# Patient Record
Sex: MALE | Race: WHITE | Employment: OTHER | URBAN - METROPOLITAN AREA
[De-identification: names, ages, dates, MRNs, and addresses within clinical notes are randomized per-mention and may not be internally consistent; named-entity substitution may affect disease eponyms.]

---

## 2018-03-13 ENCOUNTER — HOSPITAL ENCOUNTER (OUTPATIENT)
Dept: PHYSICAL THERAPY | Age: 83
Setting detail: THERAPIES SERIES
Discharge: HOME | End: 2018-03-13
Payer: MEDICARE

## 2018-03-13 PROCEDURE — 97110 THERAPEUTIC EXERCISES: CPT

## 2018-03-15 ENCOUNTER — HOSPITAL ENCOUNTER (OUTPATIENT)
Dept: PHYSICAL THERAPY | Age: 83
Setting detail: THERAPIES SERIES
Discharge: HOME | End: 2018-03-15
Payer: MEDICARE

## 2018-03-15 PROCEDURE — G8982 BODY POS GOAL STATUS: HCPCS

## 2018-03-15 PROCEDURE — G8983 BODY POS D/C STATUS: HCPCS

## 2018-03-15 PROCEDURE — 97110 THERAPEUTIC EXERCISES: CPT

## 2018-03-15 PROCEDURE — G8981 BODY POS CURRENT STATUS: HCPCS

## 2018-08-31 ENCOUNTER — APPOINTMENT (OUTPATIENT)
Dept: GENERAL RADIOLOGY | Age: 83
DRG: 190 | End: 2018-08-31
Payer: MEDICARE

## 2018-08-31 ENCOUNTER — HOSPITAL ENCOUNTER (INPATIENT)
Age: 83
LOS: 4 days | Discharge: HOME | DRG: 190 | End: 2018-09-04
Attending: EMERGENCY MEDICINE | Admitting: INTERNAL MEDICINE
Payer: MEDICARE

## 2018-08-31 DIAGNOSIS — J44.1 COPD EXACERBATION (HCC): ICD-10-CM

## 2018-08-31 DIAGNOSIS — Z99.81 OXYGEN DEPENDENT: ICD-10-CM

## 2018-08-31 DIAGNOSIS — J96.01 ACUTE RESPIRATORY FAILURE WITH HYPOXIA (HCC): ICD-10-CM

## 2018-08-31 DIAGNOSIS — R79.1 SUPRATHERAPEUTIC INR: ICD-10-CM

## 2018-08-31 DIAGNOSIS — J18.9 COMMUNITY ACQUIRED PNEUMONIA, UNSPECIFIED LATERALITY: Primary | ICD-10-CM

## 2018-08-31 PROBLEM — J44.9 COPD (CHRONIC OBSTRUCTIVE PULMONARY DISEASE) (HCC): Chronic | Status: ACTIVE | Noted: 2018-08-31

## 2018-08-31 PROBLEM — J96.00 ACUTE RESPIRATORY FAILURE (HCC): Status: ACTIVE | Noted: 2018-08-31

## 2018-08-31 PROBLEM — Z86.718 HISTORY OF DVT (DEEP VEIN THROMBOSIS): Chronic | Status: ACTIVE | Noted: 2018-08-31

## 2018-08-31 LAB
ACETAMINOPHEN LEVEL: <5 MCG/ML (ref 10–30)
ALBUMIN SERPL-MCNC: 3.5 G/DL (ref 3.5–5.2)
ALP BLD-CCNC: 77 U/L (ref 40–129)
ALT SERPL-CCNC: 25 U/L (ref 0–40)
AMPHETAMINE SCREEN, URINE: NOT DETECTED
ANION GAP SERPL CALCULATED.3IONS-SCNC: 12 MMOL/L (ref 7–16)
APTT: 42.8 SEC (ref 24.5–35.1)
AST SERPL-CCNC: 24 U/L (ref 0–39)
B.E.: -3.5 MMOL/L (ref -3–3)
BACTERIA: ABNORMAL /HPF
BARBITURATE SCREEN URINE: NOT DETECTED
BASOPHILS ABSOLUTE: 0.06 E9/L (ref 0–0.2)
BASOPHILS RELATIVE PERCENT: 0.9 % (ref 0–2)
BENZODIAZEPINE SCREEN, URINE: NOT DETECTED
BILIRUB SERPL-MCNC: 0.5 MG/DL (ref 0–1.2)
BILIRUBIN URINE: NEGATIVE
BLOOD, URINE: ABNORMAL
BUN BLDV-MCNC: 28 MG/DL (ref 8–23)
CALCIUM SERPL-MCNC: 9.2 MG/DL (ref 8.6–10.2)
CANNABINOID SCREEN URINE: NOT DETECTED
CASTS: ABNORMAL /LPF
CHLORIDE BLD-SCNC: 104 MMOL/L (ref 98–107)
CLARITY: CLEAR
CO2: 23 MMOL/L (ref 22–29)
COCAINE METABOLITE SCREEN URINE: NOT DETECTED
COHB: 1.5 % (ref 0–1.5)
COLOR: YELLOW
CREAT SERPL-MCNC: 1.6 MG/DL (ref 0.7–1.2)
CRITICAL: ABNORMAL
DATE ANALYZED: ABNORMAL
DATE OF COLLECTION: ABNORMAL
EOSINOPHILS ABSOLUTE: 0.25 E9/L (ref 0.05–0.5)
EOSINOPHILS RELATIVE PERCENT: 4 % (ref 0–6)
EPITHELIAL CELLS, UA: ABNORMAL /HPF
ETHANOL: <10 MG/DL (ref 0–0.08)
GFR AFRICAN AMERICAN: 50
GFR NON-AFRICAN AMERICAN: 42 ML/MIN/1.73
GLUCOSE BLD-MCNC: 212 MG/DL (ref 74–109)
GLUCOSE URINE: 100 MG/DL
HCO3: 19.4 MMOL/L (ref 22–26)
HCT VFR BLD CALC: 45.3 % (ref 37–54)
HEMOGLOBIN: 14.7 G/DL (ref 12.5–16.5)
HHB: 11 % (ref 0–5)
IMMATURE GRANULOCYTES #: 0.03 E9/L
IMMATURE GRANULOCYTES %: 0.5 % (ref 0–5)
INR BLD: 6.2
KETONES, URINE: NEGATIVE MG/DL
LAB: ABNORMAL
LEUKOCYTE ESTERASE, URINE: NEGATIVE
LYMPHOCYTES ABSOLUTE: 1.3 E9/L (ref 1.5–4)
LYMPHOCYTES RELATIVE PERCENT: 20.6 % (ref 20–42)
Lab: 1728
MCH RBC QN AUTO: 28.3 PG (ref 26–35)
MCHC RBC AUTO-ENTMCNC: 32.5 % (ref 32–34.5)
MCV RBC AUTO: 87.1 FL (ref 80–99.9)
METER GLUCOSE: 237 MG/DL (ref 70–110)
METHADONE SCREEN, URINE: NOT DETECTED
METHB: 0.3 % (ref 0–1.5)
MODE: ABNORMAL
MONOCYTES ABSOLUTE: 0.66 E9/L (ref 0.1–0.95)
MONOCYTES RELATIVE PERCENT: 10.4 % (ref 2–12)
NEUTROPHILS ABSOLUTE: 4.02 E9/L (ref 1.8–7.3)
NEUTROPHILS RELATIVE PERCENT: 63.6 % (ref 43–80)
NITRITE, URINE: NEGATIVE
O2 CONTENT: 18.5 ML/DL
O2 SATURATION: 88.8 % (ref 92–98.5)
O2HB: 87.2 % (ref 94–97)
OPERATOR ID: 5721
OPIATE SCREEN URINE: POSITIVE
PATIENT TEMP: 37 C
PCO2: 29.8 MMHG (ref 35–45)
PDW BLD-RTO: 15.8 FL (ref 11.5–15)
PH BLOOD GAS: 7.43 (ref 7.35–7.45)
PH UA: 5 (ref 5–9)
PHENCYCLIDINE SCREEN URINE: NOT DETECTED
PLATELET # BLD: 201 E9/L (ref 130–450)
PMV BLD AUTO: 10.5 FL (ref 7–12)
PO2: 53.4 MMHG (ref 60–100)
POTASSIUM SERPL-SCNC: 4.4 MMOL/L (ref 3.5–5)
PRO-BNP: 367 PG/ML (ref 0–450)
PROPOXYPHENE SCREEN: NOT DETECTED
PROTEIN UA: ABNORMAL MG/DL
PROTHROMBIN TIME: 69 SEC (ref 9.3–12.4)
RBC # BLD: 5.2 E12/L (ref 3.8–5.8)
RBC UA: ABNORMAL /HPF (ref 0–2)
SALICYLATE, SERUM: <0.3 MG/DL (ref 0–30)
SODIUM BLD-SCNC: 139 MMOL/L (ref 132–146)
SOURCE, BLOOD GAS: ABNORMAL
SPECIFIC GRAVITY UA: >=1.03 (ref 1–1.03)
THB: 15.1 G/DL (ref 11.5–16.5)
TIME ANALYZED: 1731
TOTAL PROTEIN: 7.5 G/DL (ref 6.4–8.3)
TRICYCLIC ANTIDEPRESSANTS SCREEN SERUM: NEGATIVE NG/ML
TROPONIN: <0.01 NG/ML (ref 0–0.03)
TSH SERPL DL<=0.05 MIU/L-ACNC: 1.64 UIU/ML (ref 0.27–4.2)
UROBILINOGEN, URINE: 0.2 E.U./DL
WBC # BLD: 6.3 E9/L (ref 4.5–11.5)
WBC UA: ABNORMAL /HPF (ref 0–5)

## 2018-08-31 PROCEDURE — 2580000003 HC RX 258: Performed by: EMERGENCY MEDICINE

## 2018-08-31 PROCEDURE — 6360000002 HC RX W HCPCS: Performed by: EMERGENCY MEDICINE

## 2018-08-31 PROCEDURE — 94664 DEMO&/EVAL PT USE INHALER: CPT

## 2018-08-31 PROCEDURE — 81001 URINALYSIS AUTO W/SCOPE: CPT

## 2018-08-31 PROCEDURE — 82805 BLOOD GASES W/O2 SATURATION: CPT

## 2018-08-31 PROCEDURE — 80307 DRUG TEST PRSMV CHEM ANLYZR: CPT

## 2018-08-31 PROCEDURE — G0480 DRUG TEST DEF 1-7 CLASSES: HCPCS

## 2018-08-31 PROCEDURE — 1200000000 HC SEMI PRIVATE

## 2018-08-31 PROCEDURE — 85730 THROMBOPLASTIN TIME PARTIAL: CPT

## 2018-08-31 PROCEDURE — 6370000000 HC RX 637 (ALT 250 FOR IP): Performed by: EMERGENCY MEDICINE

## 2018-08-31 PROCEDURE — 6370000000 HC RX 637 (ALT 250 FOR IP): Performed by: INTERNAL MEDICINE

## 2018-08-31 PROCEDURE — 83880 ASSAY OF NATRIURETIC PEPTIDE: CPT

## 2018-08-31 PROCEDURE — 94640 AIRWAY INHALATION TREATMENT: CPT

## 2018-08-31 PROCEDURE — 96365 THER/PROPH/DIAG IV INF INIT: CPT

## 2018-08-31 PROCEDURE — 85025 COMPLETE CBC W/AUTO DIFF WBC: CPT

## 2018-08-31 PROCEDURE — 2500000003 HC RX 250 WO HCPCS: Performed by: EMERGENCY MEDICINE

## 2018-08-31 PROCEDURE — 84484 ASSAY OF TROPONIN QUANT: CPT

## 2018-08-31 PROCEDURE — 84443 ASSAY THYROID STIM HORMONE: CPT

## 2018-08-31 PROCEDURE — 36415 COLL VENOUS BLD VENIPUNCTURE: CPT

## 2018-08-31 PROCEDURE — 85610 PROTHROMBIN TIME: CPT

## 2018-08-31 PROCEDURE — 82962 GLUCOSE BLOOD TEST: CPT

## 2018-08-31 PROCEDURE — 71045 X-RAY EXAM CHEST 1 VIEW: CPT

## 2018-08-31 PROCEDURE — 96375 TX/PRO/DX INJ NEW DRUG ADDON: CPT

## 2018-08-31 PROCEDURE — 80053 COMPREHEN METABOLIC PANEL: CPT

## 2018-08-31 PROCEDURE — 2580000003 HC RX 258: Performed by: INTERNAL MEDICINE

## 2018-08-31 PROCEDURE — 99285 EMERGENCY DEPT VISIT HI MDM: CPT

## 2018-08-31 RX ORDER — ONDANSETRON 2 MG/ML
4 INJECTION INTRAMUSCULAR; INTRAVENOUS EVERY 6 HOURS PRN
Status: DISCONTINUED | OUTPATIENT
Start: 2018-08-31 | End: 2018-09-04 | Stop reason: HOSPADM

## 2018-08-31 RX ORDER — DAPSONE 25 MG/1
50 TABLET ORAL DAILY
Status: DISCONTINUED | OUTPATIENT
Start: 2018-09-01 | End: 2018-09-04 | Stop reason: HOSPADM

## 2018-08-31 RX ORDER — METHYLPREDNISOLONE SODIUM SUCCINATE 40 MG/ML
40 INJECTION, POWDER, LYOPHILIZED, FOR SOLUTION INTRAMUSCULAR; INTRAVENOUS EVERY 8 HOURS
Status: DISCONTINUED | OUTPATIENT
Start: 2018-09-01 | End: 2018-09-02

## 2018-08-31 RX ORDER — GUAIFENESIN 600 MG/1
1200 TABLET, EXTENDED RELEASE ORAL 2 TIMES DAILY
Status: ON HOLD | COMMUNITY
End: 2021-08-02 | Stop reason: HOSPADM

## 2018-08-31 RX ORDER — METHYLPREDNISOLONE SODIUM SUCCINATE 125 MG/2ML
125 INJECTION, POWDER, LYOPHILIZED, FOR SOLUTION INTRAMUSCULAR; INTRAVENOUS ONCE
Status: COMPLETED | OUTPATIENT
Start: 2018-08-31 | End: 2018-08-31

## 2018-08-31 RX ORDER — WARFARIN SODIUM 3 MG/1
TABLET ORAL DAILY
Status: ON HOLD | COMMUNITY
End: 2018-09-04 | Stop reason: HOSPADM

## 2018-08-31 RX ORDER — PANTOPRAZOLE SODIUM 40 MG/1
40 TABLET, DELAYED RELEASE ORAL
Status: DISCONTINUED | OUTPATIENT
Start: 2018-09-01 | End: 2018-09-04 | Stop reason: HOSPADM

## 2018-08-31 RX ORDER — GUAIFENESIN 400 MG/1
400 TABLET ORAL
Status: DISCONTINUED | OUTPATIENT
Start: 2018-08-31 | End: 2018-09-04 | Stop reason: HOSPADM

## 2018-08-31 RX ORDER — FINASTERIDE 5 MG/1
5 TABLET, FILM COATED ORAL DAILY
Status: DISCONTINUED | OUTPATIENT
Start: 2018-09-01 | End: 2018-09-04 | Stop reason: HOSPADM

## 2018-08-31 RX ORDER — ALBUTEROL SULFATE 2.5 MG/3ML
2.5 SOLUTION RESPIRATORY (INHALATION)
Status: DISCONTINUED | OUTPATIENT
Start: 2018-08-31 | End: 2018-09-04 | Stop reason: HOSPADM

## 2018-08-31 RX ORDER — CEFTRIAXONE 1 G/1
INJECTION, POWDER, FOR SOLUTION INTRAMUSCULAR; INTRAVENOUS
Status: DISPENSED
Start: 2018-08-31 | End: 2018-09-01

## 2018-08-31 RX ORDER — FORMOTEROL FUMARATE 20 UG/2ML
20 SOLUTION RESPIRATORY (INHALATION) 2 TIMES DAILY
Status: DISCONTINUED | OUTPATIENT
Start: 2018-08-31 | End: 2018-09-04 | Stop reason: HOSPADM

## 2018-08-31 RX ORDER — ALLOPURINOL 100 MG/1
100 TABLET ORAL DAILY
COMMUNITY

## 2018-08-31 RX ORDER — DAPSONE 100 MG/1
50 TABLET ORAL DAILY
COMMUNITY
End: 2019-11-20 | Stop reason: ALTCHOICE

## 2018-08-31 RX ORDER — SIMVASTATIN 40 MG
40 TABLET ORAL NIGHTLY
COMMUNITY

## 2018-08-31 RX ORDER — GLIMEPIRIDE 4 MG/1
4 TABLET ORAL
Status: DISCONTINUED | OUTPATIENT
Start: 2018-09-01 | End: 2018-09-04 | Stop reason: HOSPADM

## 2018-08-31 RX ORDER — FINASTERIDE 5 MG/1
5 TABLET, FILM COATED ORAL DAILY
COMMUNITY

## 2018-08-31 RX ORDER — BUDESONIDE 0.5 MG/2ML
500 INHALANT ORAL 2 TIMES DAILY
Status: DISCONTINUED | OUTPATIENT
Start: 2018-08-31 | End: 2018-09-04 | Stop reason: HOSPADM

## 2018-08-31 RX ORDER — GUAIFENESIN 600 MG/1
1200 TABLET, EXTENDED RELEASE ORAL 2 TIMES DAILY
Status: DISCONTINUED | OUTPATIENT
Start: 2018-08-31 | End: 2018-08-31 | Stop reason: CLARIF

## 2018-08-31 RX ORDER — DEXTROSE MONOHYDRATE 50 MG/ML
100 INJECTION, SOLUTION INTRAVENOUS PRN
Status: DISCONTINUED | OUTPATIENT
Start: 2018-08-31 | End: 2018-09-04 | Stop reason: HOSPADM

## 2018-08-31 RX ORDER — NICOTINE POLACRILEX 4 MG
15 LOZENGE BUCCAL PRN
Status: DISCONTINUED | OUTPATIENT
Start: 2018-08-31 | End: 2018-09-04 | Stop reason: HOSPADM

## 2018-08-31 RX ORDER — LANSOPRAZOLE 30 MG/1
30 CAPSULE, DELAYED RELEASE ORAL DAILY
COMMUNITY

## 2018-08-31 RX ORDER — HYDROCODONE BITARTRATE AND ACETAMINOPHEN 7.5; 325 MG/1; MG/1
1 TABLET ORAL EVERY 6 HOURS PRN
Status: DISCONTINUED | OUTPATIENT
Start: 2018-08-31 | End: 2018-09-04 | Stop reason: HOSPADM

## 2018-08-31 RX ORDER — TROSPIUM CHLORIDE 20 MG/1
20 TABLET, FILM COATED ORAL
Status: DISCONTINUED | OUTPATIENT
Start: 2018-09-01 | End: 2018-09-04 | Stop reason: HOSPADM

## 2018-08-31 RX ORDER — GLIMEPIRIDE 4 MG/1
4 TABLET ORAL
COMMUNITY

## 2018-08-31 RX ORDER — SODIUM CHLORIDE 0.9 % (FLUSH) 0.9 %
10 SYRINGE (ML) INJECTION PRN
Status: DISCONTINUED | OUTPATIENT
Start: 2018-08-31 | End: 2018-09-04 | Stop reason: HOSPADM

## 2018-08-31 RX ORDER — LOSARTAN POTASSIUM 25 MG/1
25 TABLET ORAL DAILY
Status: DISCONTINUED | OUTPATIENT
Start: 2018-09-01 | End: 2018-09-04 | Stop reason: HOSPADM

## 2018-08-31 RX ORDER — CETIRIZINE HYDROCHLORIDE 10 MG/1
10 TABLET ORAL DAILY
Status: DISCONTINUED | OUTPATIENT
Start: 2018-09-01 | End: 2018-09-04 | Stop reason: HOSPADM

## 2018-08-31 RX ORDER — IPRATROPIUM BROMIDE AND ALBUTEROL SULFATE 2.5; .5 MG/3ML; MG/3ML
3 SOLUTION RESPIRATORY (INHALATION) ONCE
Status: COMPLETED | OUTPATIENT
Start: 2018-08-31 | End: 2018-08-31

## 2018-08-31 RX ORDER — SODIUM CHLORIDE 0.9 % (FLUSH) 0.9 %
10 SYRINGE (ML) INJECTION EVERY 12 HOURS SCHEDULED
Status: DISCONTINUED | OUTPATIENT
Start: 2018-08-31 | End: 2018-09-04 | Stop reason: HOSPADM

## 2018-08-31 RX ORDER — INSULIN GLARGINE 100 [IU]/ML
40 INJECTION, SOLUTION SUBCUTANEOUS NIGHTLY
Status: DISCONTINUED | OUTPATIENT
Start: 2018-08-31 | End: 2018-09-04 | Stop reason: HOSPADM

## 2018-08-31 RX ORDER — DOXYCYCLINE HYCLATE 100 MG/1
100 CAPSULE ORAL EVERY 12 HOURS
Status: DISCONTINUED | OUTPATIENT
Start: 2018-09-01 | End: 2018-09-04 | Stop reason: HOSPADM

## 2018-08-31 RX ORDER — TOLTERODINE 4 MG/1
4 CAPSULE, EXTENDED RELEASE ORAL DAILY
COMMUNITY

## 2018-08-31 RX ORDER — ALLOPURINOL 100 MG/1
100 TABLET ORAL DAILY
Status: DISCONTINUED | OUTPATIENT
Start: 2018-09-01 | End: 2018-09-04 | Stop reason: HOSPADM

## 2018-08-31 RX ORDER — SIMVASTATIN 40 MG
40 TABLET ORAL NIGHTLY
Status: DISCONTINUED | OUTPATIENT
Start: 2018-09-01 | End: 2018-09-04 | Stop reason: HOSPADM

## 2018-08-31 RX ORDER — TAMSULOSIN HYDROCHLORIDE 0.4 MG/1
0.4 CAPSULE ORAL DAILY
Status: DISCONTINUED | OUTPATIENT
Start: 2018-09-01 | End: 2018-09-04 | Stop reason: HOSPADM

## 2018-08-31 RX ORDER — INSULIN GLARGINE 100 [IU]/ML
32 INJECTION, SOLUTION SUBCUTANEOUS NIGHTLY
COMMUNITY

## 2018-08-31 RX ORDER — IPRATROPIUM BROMIDE AND ALBUTEROL SULFATE 2.5; .5 MG/3ML; MG/3ML
1 SOLUTION RESPIRATORY (INHALATION)
Status: DISCONTINUED | OUTPATIENT
Start: 2018-09-01 | End: 2018-09-04 | Stop reason: HOSPADM

## 2018-08-31 RX ORDER — TAMSULOSIN HYDROCHLORIDE 0.4 MG/1
0.4 CAPSULE ORAL DAILY
Status: ON HOLD | COMMUNITY
End: 2022-03-19 | Stop reason: SDUPTHER

## 2018-08-31 RX ORDER — ASPIRIN 81 MG/1
81 TABLET, CHEWABLE ORAL DAILY
Status: DISCONTINUED | OUTPATIENT
Start: 2018-09-01 | End: 2018-09-04 | Stop reason: HOSPADM

## 2018-08-31 RX ORDER — LOSARTAN POTASSIUM 25 MG/1
25 TABLET ORAL DAILY
COMMUNITY

## 2018-08-31 RX ORDER — HYDROCODONE BITARTRATE AND ACETAMINOPHEN 7.5; 325 MG/1; MG/1
1 TABLET ORAL EVERY 6 HOURS PRN
COMMUNITY
End: 2021-12-23 | Stop reason: SDUPTHER

## 2018-08-31 RX ORDER — CETIRIZINE HYDROCHLORIDE 10 MG/1
10 TABLET ORAL DAILY
COMMUNITY

## 2018-08-31 RX ORDER — DEXTROSE MONOHYDRATE 25 G/50ML
12.5 INJECTION, SOLUTION INTRAVENOUS PRN
Status: DISCONTINUED | OUTPATIENT
Start: 2018-08-31 | End: 2018-09-04 | Stop reason: HOSPADM

## 2018-08-31 RX ADMIN — GUAIFENESIN 400 MG: 400 TABLET ORAL at 22:13

## 2018-08-31 RX ADMIN — CEFTRIAXONE 1 G: 1 INJECTION, POWDER, FOR SOLUTION INTRAMUSCULAR; INTRAVENOUS at 18:43

## 2018-08-31 RX ADMIN — IPRATROPIUM BROMIDE AND ALBUTEROL SULFATE 3 AMPULE: .5; 3 SOLUTION RESPIRATORY (INHALATION) at 16:50

## 2018-08-31 RX ADMIN — INSULIN GLARGINE 40 UNITS: 100 INJECTION, SOLUTION SUBCUTANEOUS at 22:08

## 2018-08-31 RX ADMIN — INSULIN LISPRO 2 UNITS: 100 INJECTION, SOLUTION INTRAVENOUS; SUBCUTANEOUS at 22:08

## 2018-08-31 RX ADMIN — Medication 10 ML: at 22:08

## 2018-08-31 RX ADMIN — METHYLPREDNISOLONE SODIUM SUCCINATE 125 MG: 125 INJECTION, POWDER, FOR SOLUTION INTRAMUSCULAR; INTRAVENOUS at 17:18

## 2018-08-31 RX ADMIN — DOXYCYCLINE 100 MG: 100 INJECTION, POWDER, LYOPHILIZED, FOR SOLUTION INTRAVENOUS at 19:33

## 2018-08-31 ASSESSMENT — ENCOUNTER SYMPTOMS
COLOR CHANGE: 0
ABDOMINAL PAIN: 0
SORE THROAT: 0
DIARRHEA: 0
VOMITING: 0
SHORTNESS OF BREATH: 1
BACK PAIN: 0
COUGH: 1
SPUTUM PRODUCTION: 0
BLOOD IN STOOL: 0
NAUSEA: 0
RHINORRHEA: 0
CONSTIPATION: 0

## 2018-08-31 ASSESSMENT — PAIN SCALES - GENERAL: PAINLEVEL_OUTOF10: 0

## 2018-08-31 NOTE — ED PROVIDER NOTES
Patient 25-year-old male presenting to the emergency department via EMS with complaint of shortness of breath. Patient does have dementia and is confused at baseline. Most of history obtained by spouse who is at bedside. She mentions that he has COPD and wears oxygen through the night and only has needed through the days. He has been requiring more oxygen lately. She says that he has been more short of breath over the past month but say his mild cough has not changed. She said that their PCP ordered for him to get physical therapy. He had a first session today and his oxygen saturation was noted to be 82% on his 5 L of nasal cannula oxygen so he was sent here for further evaluation. EMS report that patient was combative when they arrived and police ultimately had to be called. He threatened to pink slip him but he calmed down and came. He does have history of blood clots in his legs and lungs couple of years ago and is on anticoagulation. His wife says that he also had an IVC filter placed. They are from [place] and moved here a couple of years ago. They do not have any pulmonologist that they follow with here. The PCP is Dr. Dawit Quiroz. The history is provided by the patient and the spouse. Shortness of Breath   Severity:  Moderate  Onset quality:  Gradual  Duration:  1 month  Timing:  Constant  Progression:  Unchanged  Chronicity:  Recurrent  Context comment:  Wears O2 at home; more short of breeath lately  Relieved by:  Nothing  Worsened by: Activity  Ineffective treatments:  Oxygen  Associated symptoms: cough (mild; nonproductive; unchanged)    Associated symptoms: no abdominal pain, no chest pain, no diaphoresis, no fever, no headaches, no neck pain, no rash, no sore throat, no sputum production and no vomiting        Review of Systems   Constitutional: Negative for chills, diaphoresis and fever. HENT: Negative for congestion, rhinorrhea and sore throat.     Respiratory: Positive for cough (mild; nonproductive; unchanged) and shortness of breath. Negative for sputum production. Cardiovascular: Negative for chest pain and palpitations. Gastrointestinal: Negative for abdominal pain, blood in stool, constipation, diarrhea, nausea and vomiting. Genitourinary: Negative for difficulty urinating, dysuria and hematuria. Musculoskeletal: Negative for back pain and neck pain. Skin: Negative for color change, rash and wound. Neurological: Negative for dizziness, syncope, weakness, light-headedness and headaches. Psychiatric/Behavioral: Negative for confusion. Physical Exam   Constitutional: He is oriented to person, place, and time. He appears well-developed and well-nourished. No distress. HENT:   Head: Normocephalic and atraumatic. Right Ear: External ear normal.   Left Ear: External ear normal.   Nose: Nose normal.   Mouth/Throat: Oropharynx is clear and moist. No oropharyngeal exudate. Eyes: Pupils are equal, round, and reactive to light. Conjunctivae and EOM are normal. Right eye exhibits no discharge. Left eye exhibits no discharge. No scleral icterus. Neck: Neck supple. Cardiovascular: Normal rate, regular rhythm, normal heart sounds and intact distal pulses. Exam reveals no gallop and no friction rub. No murmur heard. Pulmonary/Chest: No stridor. He is in respiratory distress (tachypnic ). He has wheezes (Mild end expiratory wheeze bilaterally). He has no rales. He exhibits no tenderness. Very slight rhonchi heard on right   Abdominal: Soft. Bowel sounds are normal. He exhibits no distension and no mass. There is no tenderness. There is no rebound and no guarding. Musculoskeletal: He exhibits no edema. Neurological: He is alert and oriented to person, place, and time. He exhibits normal muscle tone. Oriented to self only and not place or time   Skin: Skin is warm and dry. No rash noted. He is not diaphoretic. No erythema. No pallor.    Lips look a little purple allergies.     ------------------------------------------------ RESULTS ---------------------------------------------------    LABS:  Results for orders placed or performed during the hospital encounter of 08/31/18   Serum Drug Screen   Result Value Ref Range    Ethanol Lvl <10 mg/dL    Acetaminophen Level <5.0 (L) 10.0 - 16.5 mcg/mL    Salicylate, Serum <7.2 0.0 - 30.0 mg/dL   Urine Drug Screen   Result Value Ref Range    Amphetamine Screen, Urine NOT DETECTED Negative <1000 ng/mL    Barbiturate Screen, Ur NOT DETECTED Negative < 200 ng/mL    Benzodiazepine Screen, Urine NOT DETECTED Negative < 200 ng/mL    Cannabinoid Scrn, Ur NOT DETECTED Negative < 50ng/mL    Cocaine Metabolite Screen, Urine NOT DETECTED Negative < 300 ng/mL    Opiate Scrn, Ur POSITIVE (A) Negative < 300ng/mL    PCP Screen, Urine NOT DETECTED Negative < 25 ng/mL    Methadone Screen, Urine NOT DETECTED Negative <300 ng/mL    Propoxyphene Scrn, Ur NOT DETECTED Negative <300 ng/mL   Urinalysis with Microscopic   Result Value Ref Range    Color, UA Yellow Straw/Yellow    Clarity, UA Clear Clear    Glucose, Ur 100 (A) Negative mg/dL    Bilirubin Urine Negative Negative    Ketones, Urine Negative Negative mg/dL    Specific Gravity, UA >=1.030 1.005 - 1.030    Blood, Urine TRACE-INTACT Negative    pH, UA 5.0 5.0 - 9.0    Protein, UA TRACE Negative mg/dL    Urobilinogen, Urine 0.2 <2.0 E.U./dL    Nitrite, Urine Negative Negative    Leukocyte Esterase, Urine Negative Negative    Casts MANY /LPF    WBC, UA 0-1 0 - 5 /HPF    RBC, UA 0-1 0 - 2 /HPF    Epi Cells MODERATE /HPF    Bacteria, UA FEW (A) /HPF   CBC Auto Differential   Result Value Ref Range    WBC 6.3 4.5 - 11.5 E9/L    RBC 5.20 3.80 - 5.80 E12/L    Hemoglobin 14.7 12.5 - 16.5 g/dL    Hematocrit 45.3 37.0 - 54.0 %    MCV 87.1 80.0 - 99.9 fL    MCH 28.3 26.0 - 35.0 pg    MCHC 32.5 32.0 - 34.5 %    RDW 15.8 (H) 11.5 - 15.0 fL    Platelets 644 720 - 953 E9/L    MPV 10.5 7.0 - 12.0 fL    Neutrophils % 63.6 43.0 - 80.0 %    Immature Granulocytes % 0.5 0.0 - 5.0 %    Lymphocytes % 20.6 20.0 - 42.0 %    Monocytes % 10.4 2.0 - 12.0 %    Eosinophils % 4.0 0.0 - 6.0 %    Basophils % 0.9 0.0 - 2.0 %    Neutrophils # 4.02 1.80 - 7.30 E9/L    Immature Granulocytes # 0.03 E9/L    Lymphocytes # 1.30 (L) 1.50 - 4.00 E9/L    Monocytes # 0.66 0.10 - 0.95 E9/L    Eosinophils # 0.25 0.05 - 0.50 E9/L    Basophils # 0.06 0.00 - 0.20 E9/L   Comprehensive Metabolic Panel   Result Value Ref Range    Sodium 139 132 - 146 mmol/L    Potassium 4.4 3.5 - 5.0 mmol/L    Chloride 104 98 - 107 mmol/L    CO2 23 22 - 29 mmol/L    Anion Gap 12 7 - 16 mmol/L    Glucose 212 (H) 74 - 109 mg/dL    BUN 28 (H) 8 - 23 mg/dL    CREATININE 1.6 (H) 0.7 - 1.2 mg/dL    GFR Non-African American 42 >=60 mL/min/1.73    GFR African American 50     Calcium 9.2 8.6 - 10.2 mg/dL    Total Protein 7.5 6.4 - 8.3 g/dL    Alb 3.5 3.5 - 5.2 g/dL    Total Bilirubin 0.5 0.0 - 1.2 mg/dL    Alkaline Phosphatase 77 40 - 129 U/L    ALT 25 0 - 40 U/L    AST 24 0 - 39 U/L   Troponin   Result Value Ref Range    Troponin <0.01 0.00 - 0.03 ng/mL   APTT   Result Value Ref Range    aPTT 42.8 (H) 24.5 - 35.1 sec   Protime-INR   Result Value Ref Range    Protime 69.0 (H) 9.3 - 12.4 sec    INR 6.2 (HH)    TSH without Reflex   Result Value Ref Range    TSH 1.640 0.270 - 4.200 uIU/mL   Brain Natriuretic Peptide   Result Value Ref Range    Pro- 0 - 450 pg/mL   Blood Gas, Arterial   Result Value Ref Range    Date Analyzed [id number]     Time Analyzed 3826     Source: Blood Arterial     pH, Blood Gas 7.432 7.350 - 7.450    PCO2 29.8 (L) 35.0 - 45.0 mmHg    PO2 53.4 (L) 60.0 - 100.0 mmHg    HCO3 19.4 (L) 22.0 - 26.0 mmol/L    B.E. -3.5 (L) -3.0 - 3.0 mmol/L    O2 Sat 88.8 (L) 92.0 - 98.5 %    O2Hb 87.2 (L) 94.0 - 97.0 %    COHb 1.5 0.0 - 1.5 %    MetHb 0.3 0.0 - 1.5 %    O2 Content 18.5 mL/dL    HHb 11.0 (H) 0.0 - 5.0 %    tHb (est) 15.1 11.5 - 16.5 g/dL    Mode HFNC  10 L     Date Of

## 2018-08-31 NOTE — ED NOTES
Bed: 12  Expected date:   Expected time:   Means of arrival:   Comments:  EMS       Mallory Vega RN  08/31/18 7410

## 2018-09-01 ENCOUNTER — APPOINTMENT (OUTPATIENT)
Dept: CT IMAGING | Age: 83
DRG: 190 | End: 2018-09-01
Payer: MEDICARE

## 2018-09-01 PROBLEM — E66.9 OBESITY (BMI 30-39.9): Chronic | Status: ACTIVE | Noted: 2018-09-01

## 2018-09-01 LAB
ALBUMIN SERPL-MCNC: 3.4 G/DL (ref 3.5–5.2)
ALP BLD-CCNC: 76 U/L (ref 40–129)
ALT SERPL-CCNC: 23 U/L (ref 0–40)
ANION GAP SERPL CALCULATED.3IONS-SCNC: 17 MMOL/L (ref 7–16)
AST SERPL-CCNC: 19 U/L (ref 0–39)
B.E.: -8 MMOL/L (ref -3–3)
BASOPHILS ABSOLUTE: 0 E9/L (ref 0–0.2)
BASOPHILS RELATIVE PERCENT: 0 % (ref 0–2)
BETA-HYDROXYBUTYRATE: 0.18 MMOL/L (ref 0.02–0.27)
BILIRUB SERPL-MCNC: 0.5 MG/DL (ref 0–1.2)
BUN BLDV-MCNC: 30 MG/DL (ref 8–23)
CALCIUM SERPL-MCNC: 9 MG/DL (ref 8.6–10.2)
CHLORIDE BLD-SCNC: 105 MMOL/L (ref 98–107)
CO2: 17 MMOL/L (ref 22–29)
COHB: 1.3 % (ref 0–1.5)
CREAT SERPL-MCNC: 1.4 MG/DL (ref 0.7–1.2)
CRITICAL: ABNORMAL
D DIMER: 287 NG/ML DDU
DATE ANALYZED: ABNORMAL
DATE OF COLLECTION: ABNORMAL
EOSINOPHILS ABSOLUTE: 0 E9/L (ref 0.05–0.5)
EOSINOPHILS RELATIVE PERCENT: 0 % (ref 0–6)
GFR AFRICAN AMERICAN: 59
GFR NON-AFRICAN AMERICAN: 48 ML/MIN/1.73
GLUCOSE BLD-MCNC: 305 MG/DL (ref 74–109)
HBA1C MFR BLD: 9.4 % (ref 4–5.6)
HCO3: 13.8 MMOL/L (ref 22–26)
HCT VFR BLD CALC: 43.7 % (ref 37–54)
HEMOGLOBIN: 14.2 G/DL (ref 12.5–16.5)
HHB: 7.3 % (ref 0–5)
IMMATURE GRANULOCYTES #: 0.02 E9/L
IMMATURE GRANULOCYTES %: 0.5 % (ref 0–5)
INR BLD: 5.1
LAB: ABNORMAL
LYMPHOCYTES ABSOLUTE: 0.64 E9/L (ref 1.5–4)
LYMPHOCYTES RELATIVE PERCENT: 14.9 % (ref 20–42)
Lab: 1520
MAGNESIUM: 1.7 MG/DL (ref 1.6–2.6)
MCH RBC QN AUTO: 28.1 PG (ref 26–35)
MCHC RBC AUTO-ENTMCNC: 32.5 % (ref 32–34.5)
MCV RBC AUTO: 86.4 FL (ref 80–99.9)
METER GLUCOSE: 289 MG/DL (ref 70–110)
METER GLUCOSE: 304 MG/DL (ref 70–110)
METER GLUCOSE: 317 MG/DL (ref 70–110)
METER GLUCOSE: 356 MG/DL (ref 70–110)
METHB: 0.6 % (ref 0–1.5)
MODE: ABNORMAL
MONOCYTES ABSOLUTE: 0.04 E9/L (ref 0.1–0.95)
MONOCYTES RELATIVE PERCENT: 0.9 % (ref 2–12)
NEUTROPHILS ABSOLUTE: 3.59 E9/L (ref 1.8–7.3)
NEUTROPHILS RELATIVE PERCENT: 83.7 % (ref 43–80)
O2 CONTENT: 19.8 ML/DL
O2 SATURATION: 92.6 % (ref 92–98.5)
O2HB: 90.8 % (ref 94–97)
OPERATOR ID: 166
PATIENT TEMP: 37 C
PCO2: 21.6 MMHG (ref 35–45)
PDW BLD-RTO: 16.2 FL (ref 11.5–15)
PH BLOOD GAS: 7.42 (ref 7.35–7.45)
PLATELET # BLD: 197 E9/L (ref 130–450)
PMV BLD AUTO: 11.4 FL (ref 7–12)
PO2: 61.9 MMHG (ref 60–100)
POTASSIUM SERPL-SCNC: 4.5 MMOL/L (ref 3.5–5)
PROTHROMBIN TIME: 57 SEC (ref 9.3–12.4)
RBC # BLD: 5.06 E12/L (ref 3.8–5.8)
SODIUM BLD-SCNC: 139 MMOL/L (ref 132–146)
SOURCE, BLOOD GAS: ABNORMAL
THB: 15.5 G/DL (ref 11.5–16.5)
TIME ANALYZED: 1536
TOTAL PROTEIN: 7.3 G/DL (ref 6.4–8.3)
WBC # BLD: 4.3 E9/L (ref 4.5–11.5)

## 2018-09-01 PROCEDURE — 6370000000 HC RX 637 (ALT 250 FOR IP): Performed by: INTERNAL MEDICINE

## 2018-09-01 PROCEDURE — 71260 CT THORAX DX C+: CPT

## 2018-09-01 PROCEDURE — 82962 GLUCOSE BLOOD TEST: CPT

## 2018-09-01 PROCEDURE — 6360000002 HC RX W HCPCS: Performed by: INTERNAL MEDICINE

## 2018-09-01 PROCEDURE — 1200000000 HC SEMI PRIVATE

## 2018-09-01 PROCEDURE — 2700000000 HC OXYGEN THERAPY PER DAY

## 2018-09-01 PROCEDURE — 94664 DEMO&/EVAL PT USE INHALER: CPT

## 2018-09-01 PROCEDURE — 6360000004 HC RX CONTRAST MEDICATION: Performed by: RADIOLOGY

## 2018-09-01 PROCEDURE — 83036 HEMOGLOBIN GLYCOSYLATED A1C: CPT

## 2018-09-01 PROCEDURE — 80053 COMPREHEN METABOLIC PANEL: CPT

## 2018-09-01 PROCEDURE — 36415 COLL VENOUS BLD VENIPUNCTURE: CPT

## 2018-09-01 PROCEDURE — 82805 BLOOD GASES W/O2 SATURATION: CPT

## 2018-09-01 PROCEDURE — 85610 PROTHROMBIN TIME: CPT

## 2018-09-01 PROCEDURE — 85025 COMPLETE CBC W/AUTO DIFF WBC: CPT

## 2018-09-01 PROCEDURE — 83735 ASSAY OF MAGNESIUM: CPT

## 2018-09-01 PROCEDURE — 94640 AIRWAY INHALATION TREATMENT: CPT

## 2018-09-01 PROCEDURE — 82010 KETONE BODYS QUAN: CPT

## 2018-09-01 PROCEDURE — 2580000003 HC RX 258: Performed by: INTERNAL MEDICINE

## 2018-09-01 PROCEDURE — 36600 WITHDRAWAL OF ARTERIAL BLOOD: CPT

## 2018-09-01 PROCEDURE — 85378 FIBRIN DEGRADE SEMIQUANT: CPT

## 2018-09-01 PROCEDURE — 94760 N-INVAS EAR/PLS OXIMETRY 1: CPT

## 2018-09-01 RX ADMIN — DOXYCYCLINE HYCLATE 100 MG: 100 CAPSULE, GELATIN COATED ORAL at 20:18

## 2018-09-01 RX ADMIN — BUDESONIDE 500 MCG: 0.5 SUSPENSION RESPIRATORY (INHALATION) at 07:45

## 2018-09-01 RX ADMIN — IPRATROPIUM BROMIDE AND ALBUTEROL SULFATE 1 AMPULE: .5; 3 SOLUTION RESPIRATORY (INHALATION) at 15:27

## 2018-09-01 RX ADMIN — LINAGLIPTIN 5 MG: 5 TABLET, FILM COATED ORAL at 08:38

## 2018-09-01 RX ADMIN — PANTOPRAZOLE SODIUM 40 MG: 40 TABLET, DELAYED RELEASE ORAL at 06:29

## 2018-09-01 RX ADMIN — LOSARTAN POTASSIUM 25 MG: 25 TABLET, FILM COATED ORAL at 08:22

## 2018-09-01 RX ADMIN — SIMVASTATIN 40 MG: 40 TABLET, FILM COATED ORAL at 20:18

## 2018-09-01 RX ADMIN — IOPAMIDOL 80 ML: 755 INJECTION, SOLUTION INTRAVENOUS at 17:29

## 2018-09-01 RX ADMIN — IPRATROPIUM BROMIDE AND ALBUTEROL SULFATE 1 AMPULE: .5; 3 SOLUTION RESPIRATORY (INHALATION) at 11:26

## 2018-09-01 RX ADMIN — INSULIN LISPRO 4 UNITS: 100 INJECTION, SOLUTION INTRAVENOUS; SUBCUTANEOUS at 21:47

## 2018-09-01 RX ADMIN — GLIMEPIRIDE 4 MG: 4 TABLET ORAL at 06:30

## 2018-09-01 RX ADMIN — ALLOPURINOL 100 MG: 100 TABLET ORAL at 08:23

## 2018-09-01 RX ADMIN — GUAIFENESIN 400 MG: 400 TABLET ORAL at 12:21

## 2018-09-01 RX ADMIN — INSULIN LISPRO 6 UNITS: 100 INJECTION, SOLUTION INTRAVENOUS; SUBCUTANEOUS at 08:23

## 2018-09-01 RX ADMIN — INSULIN LISPRO 8 UNITS: 100 INJECTION, SOLUTION INTRAVENOUS; SUBCUTANEOUS at 12:21

## 2018-09-01 RX ADMIN — GUAIFENESIN 400 MG: 400 TABLET ORAL at 20:18

## 2018-09-01 RX ADMIN — GUAIFENESIN 400 MG: 400 TABLET ORAL at 06:29

## 2018-09-01 RX ADMIN — CETIRIZINE HYDROCHLORIDE 10 MG: 10 TABLET, FILM COATED ORAL at 08:23

## 2018-09-01 RX ADMIN — Medication 10 ML: at 20:19

## 2018-09-01 RX ADMIN — DOXYCYCLINE HYCLATE 100 MG: 100 CAPSULE, GELATIN COATED ORAL at 08:22

## 2018-09-01 RX ADMIN — FORMOTEROL FUMARATE DIHYDRATE 20 MCG: 20 SOLUTION RESPIRATORY (INHALATION) at 20:52

## 2018-09-01 RX ADMIN — METHYLPREDNISOLONE SODIUM SUCCINATE 40 MG: 40 INJECTION, POWDER, LYOPHILIZED, FOR SOLUTION INTRAMUSCULAR; INTRAVENOUS at 17:55

## 2018-09-01 RX ADMIN — TAMSULOSIN HYDROCHLORIDE 0.4 MG: 0.4 CAPSULE ORAL at 08:22

## 2018-09-01 RX ADMIN — METHYLPREDNISOLONE SODIUM SUCCINATE 40 MG: 40 INJECTION, POWDER, LYOPHILIZED, FOR SOLUTION INTRAMUSCULAR; INTRAVENOUS at 01:28

## 2018-09-01 RX ADMIN — INSULIN LISPRO 10 UNITS: 100 INJECTION, SOLUTION INTRAVENOUS; SUBCUTANEOUS at 17:56

## 2018-09-01 RX ADMIN — ASPIRIN 81 MG 81 MG: 81 TABLET ORAL at 08:22

## 2018-09-01 RX ADMIN — GUAIFENESIN 400 MG: 400 TABLET ORAL at 17:55

## 2018-09-01 RX ADMIN — TROSPIUM CHLORIDE 20 MG: 20 TABLET ORAL at 17:55

## 2018-09-01 RX ADMIN — GUAIFENESIN 400 MG: 400 TABLET ORAL at 08:22

## 2018-09-01 RX ADMIN — BUDESONIDE 500 MCG: 0.5 SUSPENSION RESPIRATORY (INHALATION) at 20:52

## 2018-09-01 RX ADMIN — INSULIN GLARGINE 40 UNITS: 100 INJECTION, SOLUTION SUBCUTANEOUS at 21:48

## 2018-09-01 RX ADMIN — Medication 10 ML: at 08:23

## 2018-09-01 RX ADMIN — FINASTERIDE 5 MG: 5 TABLET, FILM COATED ORAL at 08:23

## 2018-09-01 RX ADMIN — METHYLPREDNISOLONE SODIUM SUCCINATE 40 MG: 40 INJECTION, POWDER, LYOPHILIZED, FOR SOLUTION INTRAMUSCULAR; INTRAVENOUS at 08:23

## 2018-09-01 RX ADMIN — Medication 10 ML: at 17:55

## 2018-09-01 RX ADMIN — TROSPIUM CHLORIDE 20 MG: 20 TABLET ORAL at 06:30

## 2018-09-01 RX ADMIN — DAPSONE 50 MG: 25 TABLET ORAL at 08:22

## 2018-09-01 RX ADMIN — FORMOTEROL FUMARATE DIHYDRATE 20 MCG: 20 SOLUTION RESPIRATORY (INHALATION) at 07:46

## 2018-09-01 ASSESSMENT — PAIN SCALES - GENERAL: PAINLEVEL_OUTOF10: 0

## 2018-09-01 NOTE — H&P
[address] [facility]  Attending History and Physical      CHIEF COMPLAINT:  Shortness of breath      HISTORY OF PRESENT ILLNESS:      The patient is a 81 y.o. male patient of dr Marin ha who presents with complains of shortness of breath. Patient is a very poor historian. He is awake and alert. He is oriented times 3. He states he was forced to come to the ED. It was reported he was short of breath at home. He states he has supplemental oxygen at home but only wears it when he needs it. He states he never needs it. Nonetheless, his pulse ox was low. EMS were called and the patient presented to the ED. He is sitting up in bed eating breakfast.  He offers no complaints. He denies chest pain, shortness of breath, abdominal pain, nausea, vomiting, fevers, chills and diaphoresis. He wants to go home. Past Medical History:    Past Medical History:   Diagnosis Date    [facility] obstructive [facility] disease[facility] ([facility]     [facility]     Hyperlipidemia     Hypertension        Past Surgical History:    History reviewed. No pertinent surgical history.     Medications Prior to Admission:    Prescriptions Prior to Admission: allopurinol (ZYLOPRIM) 100 MG tablet, Take 100 mg by mouth daily  aspirin 81 MG tablet, Take 81 mg by mouth daily  tolterodine (DETROL LA) 4 MG extended release capsule, Take 4 mg by mouth daily  finasteride (PROSCAR) 5 MG tablet, Take 5 mg by mouth daily  tamsulosin (FLOMAX) 0.4 MG capsule, Take 0.4 mg by mouth daily  glimepiride (AMARYL) 4 MG tablet, Take 4 mg by mouth every morning (before breakfast)  lansoprazole (PREVACID) 30 MG delayed release capsule, Take 30 mg by mouth daily  insulin glargine (LANTUS) 100 UNIT/ML injection vial, Inject 40 Units into the skin nightly  losartan (COZAAR) 25 MG tablet, Take 25 mg by mouth daily  guaiFENesin (MUCINEX) 600 MG extended release tablet, Take 1,200 mg by mouth 2 times daily  simvastatin (ZOCOR) 40 MG tablet, Take 40 mg by mouth nightly  HYDROcodone-acetaminophen (NORCO) 7.5-325 MG per tablet, Take 1 tablet by mouth every 6 hours as needed for Pain. .  cetirizine (ZYRTEC) 10 MG tablet, Take 10 mg by mouth daily  SITagliptin (JANUVIA) 100 MG tablet, Take 100 mg by mouth daily  dapsone 100 MG tablet, Take 50 mg by mouth daily  warfarin (COUMADIN) 3 MG tablet, Take by mouth daily    Allergies:    Penicillin g; Penicillins; and Tetanus toxoids    Social History:    reports that he has quit smoking. He has never used smokeless tobacco. He reports that he does not drink alcohol or use drugs. Family History:   family history is not on file. REVIEW OF SYSTEMS:  As above in the HPI, otherwise negative    PHYSICAL EXAM:    Vitals:  /78   Pulse 93   Temp 97.5 °F (36.4 °C) (Oral)   Resp 21   Ht 5' 7\" (1.702 m)   Wt 200 lb (90.7 kg)   SpO2 90%   BMI 31.32 kg/m²     General:  Awake, alert, oriented X 3. Frail appearing. HEENT:  Normocephalic, atraumatic. Pupils equal, round, reactive to light. No scleral icterus. No conjunctival injection. Normal lips, teeth, and gums. No nasal discharge. Neck:  Supple  Heart:  RRR, no murmurs, gallops, rubs  Lungs:  Bilateral rhonchi  Abdomen: Bowel sounds present, soft, nontender, no masses, no organomegaly, no peritoneal signs  Extremities:  No clubbing, cyanosis, or edema  Skin:  Warm and dry, no open lesions or rash  Neuro:  Cranial nerves 2-12 intact, no focal deficits. General physical deconditioning. Mild cognitive impairment.     Breast: deferred  Rectal: deferred  Genitalia:  deferred    LABS:    CBC with Differential:    Lab Results   Component Value Date    WBC 4.3 09/01/2018    RBC 5.06 09/01/2018    HGB 14.2 09/01/2018    HCT 43.7 09/01/2018     09/01/2018    MCV 86.4 09/01/2018    MCH 28.1 09/01/2018    MCHC 32.5 09/01/2018    RDW 16.2 09/01/2018    LYMPHOPCT 14.9 09/01/2018    MONOPCT 0.9 09/01/2018    BASOPCT 0.0 09/01/2018    MONOSABS 0.04 09/01/2018    LYMPHSABS 0.64 09/01/2018    EOSABS 0.00 09/01/2018    BASOSABS 0.00 09/01/2018     CMP:    Lab Results   Component Value Date     09/01/2018    K 4.5 09/01/2018     09/01/2018    CO2 17 09/01/2018    BUN 30 09/01/2018    CREATININE 1.4 09/01/2018    GFRAA 59 09/01/2018    LABGLOM 48 09/01/2018    GLUCOSE 305 09/01/2018    PROT 7.3 09/01/2018    LABALBU 3.4 09/01/2018    CALCIUM 9.0 09/01/2018    BILITOT 0.5 09/01/2018    ALKPHOS 76 09/01/2018    AST 19 09/01/2018    ALT 23 09/01/2018     BMP:    Lab Results   Component Value Date     09/01/2018    K 4.5 09/01/2018     09/01/2018    CO2 17 09/01/2018    BUN 30 09/01/2018    LABALBU 3.4 09/01/2018    CREATININE 1.4 09/01/2018    CALCIUM 9.0 09/01/2018    GFRAA 59 09/01/2018    LABGLOM 48 09/01/2018    GLUCOSE 305 09/01/2018     Magnesium:    Lab Results   Component Value Date    MG 1.7 09/01/2018     Phosphorus:  No results found for: PHOS  PT/INR:    Lab Results   Component Value Date    PROTIME 57.0 09/01/2018    INR 5.1 09/01/2018     PTT:    Lab Results   Component Value Date    APTT 42.8 08/31/2018   [APTT}  Troponin:    Lab Results   Component Value Date    TROPONINI <0.01 08/31/2018     Last 3 Troponin:    Lab Results   Component Value Date    TROPONINI <0.01 08/31/2018     U/A:    Lab Results   Component Value Date    COLORU Yellow 08/31/2018    PROTEINU TRACE 08/31/2018    PHUR 5.0 08/31/2018    LABCAST MANY 08/31/2018    WBCUA 0-1 08/31/2018    RBCUA 0-1 08/31/2018    BACTERIA FEW 08/31/2018    CLARITYU Clear 08/31/2018    SPECGRAV >=1.030 08/31/2018    LEUKOCYTESUR Negative 08/31/2018    UROBILINOGEN 0.2 08/31/2018    BILIRUBINUR Negative 08/31/2018    BLOODU TRACE-INTACT 08/31/2018    GLUCOSEU 100 08/31/2018     HgBA1c:  No results found for: LABA1C  FLP:  No results found for: TRIG, HDL, LDLCALC, LDLDIRECT, LABVLDL  TSH:    Lab Results   Component Value Date    TSH 1.640 08/31/2018       ASSESSMENT:         Acute

## 2018-09-01 NOTE — PROGRESS NOTES
Spoke to the wife clarified with her the patient wears 8L O2 only at night and they he hasn't seen a pulmonologist in 2 years because they moved and never found a new one.

## 2018-09-01 NOTE — CONSULTS
nail changes. · Neurological: No headache, diplopia, dizziness, tremor, change in muscle strength, numbness or tingling. No change in gait, balance, coordination, mood, affect, memory, mentation, behavior. · Psychiatric: No anxiety or depression. · Endocrine: No temperature intolerance, excessive thirst, fluid intake, urinary frequency, excessive appetite, or recent weight change. · Hematologic/Lymphatic: No abnormal bruising or bleeding, blood clots or swollen lymph nodes. No anemia, fever, chills, night sweats, or swollen glands. · Allergic/Immunologic: No seasonal or perenial allergies. No history of hives or atopic dermatitis.     Social History:  · Alcohol:  No  · Tobacco:   ++++  · Employment:  no silica or asbestos exposure  · Family:  No family history of lung disease    Medications:   dextrose        linagliptin  5 mg Oral Daily    allopurinol  100 mg Oral Daily    aspirin  81 mg Oral Daily    cetirizine  10 mg Oral Daily    dapsone  50 mg Oral Daily    finasteride  5 mg Oral Daily    glimepiride  4 mg Oral QAM AC    insulin glargine  40 Units Subcutaneous Nightly    pantoprazole  40 mg Oral QAM AC    losartan  25 mg Oral Daily    simvastatin  40 mg Oral Nightly    tamsulosin  0.4 mg Oral Daily    trospium  20 mg Oral BID AC    sodium chloride flush  10 mL Intravenous 2 times per day    ipratropium-albuterol  1 ampule Inhalation Q4H WA    methylPREDNISolone  40 mg Intravenous Q8H    doxycycline hyclate  100 mg Oral Q12H    insulin lispro  0-12 Units Subcutaneous TID WC    insulin lispro  0-6 Units Subcutaneous Nightly    budesonide  500 mcg Nebulization BID    formoterol  20 mcg Nebulization BID    guaiFENesin  400 mg Oral 6x Daily       Vitals:  Tmax:  VITALS:  /78   Pulse 93   Temp 97.5 °F (36.4 °C) (Oral)   Resp 21   Ht 5' 7\" (1.702 m)   Wt 200 lb (90.7 kg)   SpO2 90%   BMI 31.32 kg/m²   24HR INTAKE/OUTPUT:    Intake/Output Summary (Last 24 hours) at 09/01/18 [address] filed at 18 1230   Gross per 24 hour   Intake              650 ml   Output                0 ml   Net              650 ml     CURRENT PULSE OXIMETRY:  SpO2: 90 %  24HR PULSE OXIMETRY RANGE:  SpO2  Av.8 %  Min: 85 %  Max: 93 %    EXAM:  General: No distress. Alert. Eyes: PERRL. No sclera icterus. No conjunctival injection. ENT: No discharge. Pharynx clear. Neck: Trachea midline. Normal thyroid. No jvd, no hjr. Resp: Decreased wheezing. No accessory muscle use. No rales. No rhonchi. CV: Regular rate. Regular rhythm. No murmur No rub. Abd: Non-tender. Non-distended. No masses. No organmegaly. Normal bowel sounds. Skin: Warm and dry. No nodule on exposed extremities. No rash on exposed extremities. Lymph: No cervical LAD. No supraclavicular LAD. Ext: No joint deformity. No clubbing. No cyanosis. No edema  Neuro: Awake. Follows commands. Positive pupils/gag/corneals. Normal pain response. Lab Results:  CBC: Recent Labs      18   1636  18   0520   WBC  6.3  4.3*   HGB  14.7  14.2   HCT  45.3  43.7   MCV  87.1  86.4   PLT  201  197       BMP:  Recent Labs      18   1636  18   0713   NA  139  139   K  4.4  4.5   CL  104  105   CO2  23  17*   BUN  28*  30*   CREATININE  1.6*  1.4*    ALB:3,BILIDIR:3,BILITOT:3,ALKPHOS:3)@    PT/INR:   Recent Labs      18   1636  18   0520   PROTIME  69.0*  57.0*   INR  6.2*  5.1*       Cultures:  Sputum: not available  Blood: not available    ABG:   Recent Labs      18   1520   PH  7.424   PO2  61.9   PCO2  21.6*   HCO3  13.8*   BE  -8.0*   O2SAT  92.6   METHB  0.6   O2HB  90.8*   COHB  1.3   O2CON  19.8   HHB  7.3*   THB  15.5             Films:     XR CHEST PORTABLE   Final Result      Groundglass opacity in the right parahilar region, can represent an   acute pneumonic process. Cannot exclude also infiltrate in the left lower lobe posteriorly.       CT CHEST W CONTRAST    (Results Pending) .        Assessment:  1. GOLD class 4 chronic obstructive pulmonary disease secondary to long-standing tobacco abuse  2. Abnormal chest radiograph: Prominent fat pad versus infiltrates  3. Cannot rule out underlying thromboembolic disease  4. Metabolic acidosis, positive anion gap: With known diabetes, possible DKA      Plan:  1. Treat for COPD aggressively, despite profound hypoxia, there is no hypercapnia and the patient is clearly able to hyperventilate  2. Check beta hydroxybutyrate  3. CT of the chest to check on pulmonary parenchyma and to check for any significant thromboembolic disease      Thanks for letting us see this patient in consultation. Please contact us with any questions. Office [phone] or after hours through [employer], x [phone]. Please note that voice recognition technology was used in the preparation of this note and make therefore it may contain inadvertent transcription errors    Matt Ojeda M.D., F.C.C.P.     [facility], [address], [address] [place] [address]

## 2018-09-02 PROBLEM — F03.90 DEMENTIA (HCC): Status: ACTIVE | Noted: 2018-09-02

## 2018-09-02 LAB
INR BLD: 4.6
METER GLUCOSE: 190 MG/DL (ref 70–110)
METER GLUCOSE: 234 MG/DL (ref 70–110)
METER GLUCOSE: 243 MG/DL (ref 70–110)
METER GLUCOSE: 254 MG/DL (ref 70–110)
PROTHROMBIN TIME: 50.2 SEC (ref 9.3–12.4)

## 2018-09-02 PROCEDURE — 2700000000 HC OXYGEN THERAPY PER DAY

## 2018-09-02 PROCEDURE — 36415 COLL VENOUS BLD VENIPUNCTURE: CPT

## 2018-09-02 PROCEDURE — 82962 GLUCOSE BLOOD TEST: CPT

## 2018-09-02 PROCEDURE — 85610 PROTHROMBIN TIME: CPT

## 2018-09-02 PROCEDURE — 6370000000 HC RX 637 (ALT 250 FOR IP): Performed by: INTERNAL MEDICINE

## 2018-09-02 PROCEDURE — 6370000000 HC RX 637 (ALT 250 FOR IP): Performed by: PSYCHIATRY & NEUROLOGY

## 2018-09-02 PROCEDURE — 1200000000 HC SEMI PRIVATE

## 2018-09-02 PROCEDURE — 2580000003 HC RX 258: Performed by: INTERNAL MEDICINE

## 2018-09-02 PROCEDURE — 94640 AIRWAY INHALATION TREATMENT: CPT

## 2018-09-02 PROCEDURE — 99222 1ST HOSP IP/OBS MODERATE 55: CPT | Performed by: PSYCHIATRY & NEUROLOGY

## 2018-09-02 PROCEDURE — 6360000002 HC RX W HCPCS: Performed by: INTERNAL MEDICINE

## 2018-09-02 RX ORDER — OLANZAPINE 5 MG/1
5 TABLET, ORALLY DISINTEGRATING ORAL EVERY 6 HOURS PRN
Status: DISCONTINUED | OUTPATIENT
Start: 2018-09-02 | End: 2018-09-04 | Stop reason: HOSPADM

## 2018-09-02 RX ORDER — OLANZAPINE 10 MG/1
5 INJECTION, POWDER, LYOPHILIZED, FOR SOLUTION INTRAMUSCULAR EVERY 6 HOURS PRN
Status: DISCONTINUED | OUTPATIENT
Start: 2018-09-02 | End: 2018-09-04 | Stop reason: HOSPADM

## 2018-09-02 RX ADMIN — SIMVASTATIN 40 MG: 40 TABLET, FILM COATED ORAL at 20:01

## 2018-09-02 RX ADMIN — FORMOTEROL FUMARATE DIHYDRATE 20 MCG: 20 SOLUTION RESPIRATORY (INHALATION) at 20:11

## 2018-09-02 RX ADMIN — GUAIFENESIN 400 MG: 400 TABLET ORAL at 10:52

## 2018-09-02 RX ADMIN — INSULIN LISPRO 3 UNITS: 100 INJECTION, SOLUTION INTRAVENOUS; SUBCUTANEOUS at 17:14

## 2018-09-02 RX ADMIN — BUDESONIDE 500 MCG: 0.5 SUSPENSION RESPIRATORY (INHALATION) at 20:11

## 2018-09-02 RX ADMIN — ASPIRIN 81 MG 81 MG: 81 TABLET ORAL at 08:50

## 2018-09-02 RX ADMIN — OLANZAPINE 5 MG: 5 TABLET, ORALLY DISINTEGRATING ORAL at 15:29

## 2018-09-02 RX ADMIN — GLIMEPIRIDE 4 MG: 4 TABLET ORAL at 08:50

## 2018-09-02 RX ADMIN — CETIRIZINE HYDROCHLORIDE 10 MG: 10 TABLET, FILM COATED ORAL at 08:50

## 2018-09-02 RX ADMIN — LINAGLIPTIN 5 MG: 5 TABLET, FILM COATED ORAL at 08:50

## 2018-09-02 RX ADMIN — IPRATROPIUM BROMIDE AND ALBUTEROL SULFATE 1 AMPULE: .5; 3 SOLUTION RESPIRATORY (INHALATION) at 15:34

## 2018-09-02 RX ADMIN — INSULIN LISPRO 4 UNITS: 100 INJECTION, SOLUTION INTRAVENOUS; SUBCUTANEOUS at 08:53

## 2018-09-02 RX ADMIN — ALLOPURINOL 100 MG: 100 TABLET ORAL at 08:50

## 2018-09-02 RX ADMIN — IPRATROPIUM BROMIDE AND ALBUTEROL SULFATE 1 AMPULE: .5; 3 SOLUTION RESPIRATORY (INHALATION) at 11:22

## 2018-09-02 RX ADMIN — LOSARTAN POTASSIUM 25 MG: 25 TABLET, FILM COATED ORAL at 08:50

## 2018-09-02 RX ADMIN — GUAIFENESIN 400 MG: 400 TABLET ORAL at 20:01

## 2018-09-02 RX ADMIN — INSULIN GLARGINE 40 UNITS: 100 INJECTION, SOLUTION SUBCUTANEOUS at 21:13

## 2018-09-02 RX ADMIN — DOXYCYCLINE HYCLATE 100 MG: 100 CAPSULE, GELATIN COATED ORAL at 20:01

## 2018-09-02 RX ADMIN — DOXYCYCLINE HYCLATE 100 MG: 100 CAPSULE, GELATIN COATED ORAL at 08:50

## 2018-09-02 RX ADMIN — INSULIN LISPRO 2 UNITS: 100 INJECTION, SOLUTION INTRAVENOUS; SUBCUTANEOUS at 21:13

## 2018-09-02 RX ADMIN — Medication 10 ML: at 20:02

## 2018-09-02 RX ADMIN — TAMSULOSIN HYDROCHLORIDE 0.4 MG: 0.4 CAPSULE ORAL at 08:52

## 2018-09-02 RX ADMIN — TROSPIUM CHLORIDE 20 MG: 20 TABLET ORAL at 17:14

## 2018-09-02 RX ADMIN — GUAIFENESIN 400 MG: 400 TABLET ORAL at 12:49

## 2018-09-02 RX ADMIN — DAPSONE 50 MG: 25 TABLET ORAL at 08:52

## 2018-09-02 RX ADMIN — INSULIN LISPRO 9 UNITS: 100 INJECTION, SOLUTION INTRAVENOUS; SUBCUTANEOUS at 12:49

## 2018-09-02 RX ADMIN — IPRATROPIUM BROMIDE AND ALBUTEROL SULFATE 1 AMPULE: .5; 3 SOLUTION RESPIRATORY (INHALATION) at 20:11

## 2018-09-02 RX ADMIN — FINASTERIDE 5 MG: 5 TABLET, FILM COATED ORAL at 08:50

## 2018-09-02 ASSESSMENT — PAIN SCALES - GENERAL
PAINLEVEL_OUTOF10: 0
PAINLEVEL_OUTOF10: 0

## 2018-09-02 NOTE — PROGRESS NOTES
Message left thru perfect serve to update Dr Dwight Linton re pt's confusion,refusing care,refusing medication,refusing to have tele leads on and refusing to have staff draw blood and place IV line in.

## 2018-09-02 NOTE — PROGRESS NOTES
Called wife and explained pt has been verbally abusive and combative with staff  for the second time asked if she could come in to help calm him down. Said she can come but not this time, will take a couple of hrs.

## 2018-09-02 NOTE — CONSULTS
negative, dermatologic - negative, rheumatologic - negative, musculoskeletal - negative,  - negative, hematologic - negative    PAST SURGICAL HISTORY: History reviewed. No pertinent surgical history.     MEDICATIONS: Current Facility-Administered Medications: insulin lispro (HUMALOG) injection vial 0-18 Units, 0-18 Units, Subcutaneous, TID WC  linagliptin (TRADJENTA) tablet 5 mg, 5 mg, Oral, Daily  allopurinol (ZYLOPRIM) tablet 100 mg, 100 mg, Oral, Daily  aspirin chewable tablet 81 mg, 81 mg, Oral, Daily  cetirizine (ZYRTEC) tablet 10 mg, 10 mg, Oral, Daily  dapsone tablet 50 mg, 50 mg, Oral, Daily  finasteride (PROSCAR) tablet 5 mg, 5 mg, Oral, Daily  glimepiride (AMARYL) tablet 4 mg, 4 mg, Oral, QAM AC  HYDROcodone-acetaminophen (NORCO) 7.5-325 MG per tablet 1 tablet, 1 tablet, Oral, Q6H PRN  insulin glargine (LANTUS) injection vial 40 Units, 40 Units, Subcutaneous, Nightly  pantoprazole (PROTONIX) tablet 40 mg, 40 mg, Oral, QAM AC  losartan (COZAAR) tablet 25 mg, 25 mg, Oral, Daily  simvastatin (ZOCOR) tablet 40 mg, 40 mg, Oral, Nightly  tamsulosin (FLOMAX) capsule 0.4 mg, 0.4 mg, Oral, Daily  trospium (SANCTURA) tablet 20 mg, 20 mg, Oral, BID AC  sodium chloride flush 0.9 % injection 10 mL, 10 mL, Intravenous, 2 times per day  sodium chloride flush 0.9 % injection 10 mL, 10 mL, Intravenous, PRN  magnesium hydroxide (MILK OF MAGNESIA) 400 MG/5ML suspension 30 mL, 30 mL, Oral, Daily PRN  ondansetron (ZOFRAN) injection 4 mg, 4 mg, Intravenous, Q6H PRN  glucose (GLUTOSE) 40 % oral gel 15 g, 15 g, Oral, PRN  dextrose 50 % solution 12.5 g, 12.5 g, Intravenous, PRN  glucagon (rDNA) injection 1 mg, 1 mg, Intramuscular, PRN  dextrose 5 % solution, 100 mL/hr, Intravenous, PRN  ipratropium-albuterol (DUONEB) nebulizer solution 1 ampule, 1 ampule, Inhalation, Q4H WA  albuterol (PROVENTIL) nebulizer solution 2.5 mg, 2.5 mg, Nebulization, Q2H PRN  doxycycline hyclate (VIBRAMYCIN) capsule 100 mg, 100 mg, Oral, Q12H  insulin lispro (HUMALOG) injection vial 0-6 Units, 0-6 Units, Subcutaneous, Nightly  budesonide (PULMICORT) nebulizer suspension 500 mcg, 500 mcg, Nebulization, BID  formoterol (PERFOROMIST) nebulizer solution 20 mcg, 20 mcg, Nebulization, BID  guaiFENesin tablet 400 mg, 400 mg, Oral, 6x Daily    ALLERGIES:  Penicillin g; Penicillins; and Tetanus toxoids    FAMILY PSYCHIATRIC HISTORY: Unclear. SOCIAL HISTORY: Patient is . He reports having three children. He says he is retired from [employer] and working at [employer]. SUBSTANCE ABUSE HISTORY:  reports that he has quit smoking. He has never used smokeless tobacco. He reports that he does not drink alcohol or use drugs. VITALS:   Vitals:    09/02/18 0848   BP: 118/61   Pulse: 106   Resp: 20   Temp: 98.1 °F (36.7 °C)   SpO2: 90%     MENTAL STATUS EXAMINATION  White male appears age. Irritable and somewhat fearful appearing. No overt agitation or threatening behavior but cooperation with interview is limited. Psychomotor activity reveals repetitive purposeless twirling of oxygen tubing. Gait, strength and tone normal. Intense eye contact. Mood irritable. Affect congruent. Speech clear. Thought process loosely organized. Content with paranoia towards staff. No overt delusions. Denies hallucinations but questionable internal stimulation at times. LOC seems to be fluctuating. No evidence of suicidal or homicidal ideation. Orientation, concentration and recent memory are impaired. Remote memory intact. Fund of knowledge limited. Language use limited. Insight and judgment impaired. 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 I spoke with wife who reports patient has never had any significant psychiatric treatment or behavioral issues in the past but was told he had mild dementia.  I

## 2018-09-02 NOTE — PROGRESS NOTES
Staff checked on pt. Pt walked to the bathroom with out o2 on, staff called for help to make sure pt is safe. Pt came out if the bathroom agitated verbally abusive calling staff with mean and verbally abusive words. Pushed staff out the room so he can shut the door. Explained to pt he needed the 02 on, he might fall and injure himself. Pt got sob placed 02 on. Paged for code violet officer in came to floor to help redirect and reorient pt. Pt still arguing and combative with officer.

## 2018-09-02 NOTE — PROGRESS NOTES
Pulmonary Progress Note    Admit Date: 2018  Hospital day                               PCP: Audra Hylton MD    Chief Complaint (s):  Patient Active Problem List   Diagnosis    Acute respiratory failure ([facility]    COPD (chronic obstructive pulmonary disease) ([facility]    Diabetes mellitus type 2, uncontrolled ([facility]    History of DVT (deep vein thrombosis)    Obesity (BMI 30-39. 9)    Dementia       Subjective:  - Awake and alert, sitting up. Wife and daughter at the bedside. He can't really accuse any improvement in his breathing. CT scan is reviewed with the patient in the presence of his family.       Vitals:  VITALS:  /61   Pulse 106   Temp 98.1 °F (36.7 °C) (Oral)   Resp 20   Ht 5' 7\" (1.702 m)   Wt 200 lb (90.7 kg)   SpO2 90%   BMI 31.32 kg/m²     24HR INTAKE/OUTPUT:      Intake/Output Summary (Last 24 hours) at 18 1719  Last data filed at 18 1310   Gross per 24 hour   Intake              600 ml   Output                0 ml   Net              600 ml       24HR PULSE OXIMETRY RANGE:    SpO2  Av.3 %  Min: 77 %  Max: 92 %    Medications:  IV:   dextrose         Scheduled Meds:   insulin lispro  0-18 Units Subcutaneous TID WC    linagliptin  5 mg Oral Daily    allopurinol  100 mg Oral Daily    aspirin  81 mg Oral Daily    cetirizine  10 mg Oral Daily    dapsone  50 mg Oral Daily    finasteride  5 mg Oral Daily    glimepiride  4 mg Oral QAM AC    insulin glargine  40 Units Subcutaneous Nightly    pantoprazole  40 mg Oral QAM AC    losartan  25 mg Oral Daily    simvastatin  40 mg Oral Nightly    tamsulosin  0.4 mg Oral Daily    trospium  20 mg Oral BID AC    sodium chloride flush  10 mL Intravenous 2 times per day    ipratropium-albuterol  1 ampule Inhalation Q4H WA    doxycycline hyclate  100 mg Oral Q12H    insulin lispro  0-6 Units Subcutaneous Nightly    budesonide  500 mcg Nebulization BID    formoterol  20 mcg Nebulization BID    guaiFENesin  400 mg Oral 6x Daily       Diet:   DIET CARB CONTROL;     EXAM:  General: No distress. Alert. Eyes: PERRL. No sclera icterus. No conjunctival injection. ENT: No discharge. Pharynx clear. Neck: Trachea midline. Normal thyroid. Resp: No accessory muscle use. Bilateral bibasilar rales. No wheezing. No rhonchi. CV: Regular rate. Regular rhythm. No murmur or rub. Abd: Non-tender. Non-distended. No masses. No organomegaly. Normal bowel sounds. Skin: Warm and dry. No nodule on exposed extremities. No rash on exposed extremities. Ext: No cyanosis, clubbing, edema  Lymph: No cervical LAD. No supraclavicular LAD. M/S: No cyanosis. No joint deformity. No clubbing. Neuro: Awake. Follows commands. Positive pupils/gag/corneals. Normal pain response. Results:  CBC: Recent Labs      08/31/18 1636 09/01/18   0520   WBC  6.3  4.3*   HGB  14.7  14.2   HCT  45.3  43.7   MCV  87.1  86.4   PLT  201  197     BMP: Recent Labs      08/31/18 1636  09/01/18   0713   NA  139  139   K  4.4  4.5   CL  104  105   CO2  23  17*   BUN  28*  30*   CREATININE  1.6*  1.4*     LIVER PROFILE:   Recent Labs      08/31/18 1636  09/01/18   0713   AST  24  19   ALT  25  23   BILITOT  0.5  0.5   ALKPHOS  77  76     PT/INR:   Recent Labs      08/31/18 1636  09/01/18   0520  09/02/18   1145   PROTIME  69.0*  57.0*  50.2*   INR  6.2*  5.1*  4.6     APTT:   Recent Labs      08/31/18   1636   APTT  42.8*         Pathology:  1. N/A      Microbiology:  1. None    Recent ABG:   Recent Labs      09/01/18   1520   PH  7.424   PO2  61.9   PCO2  21.6*   HCO3  13.8*   BE  -8.0*   O2SAT  92.6   METHB  0.6   O2HB  90.8*   COHB  1.3   O2CON  19.8   HHB  7.3*   THB  15.5             Recent Films:  CT CHEST W CONTRAST   Final Result   Presence of emphysema changes with prominent fair   widespread areas of pulmonary fibrosis as above commented.             XR CHEST PORTABLE   Final Result      Groundglass opacity in the right parahilar region, can represent an   acute pneumonic process. Cannot exclude also infiltrate in the left lower lobe posteriorly. Assessment:  1. GOLD class 4 chronic obstructive pulmonary disease secondary to long-standing tobacco abuse  2. Pulmonary fibrosis: Rather extensive by CT. This does not appear to be typical for IPF but is likely simply that fibrosis associated with advanced COPD  3. Abnormal chest radiograph: Prominent fat pad versus infiltrates  4. No evidence of thromboembolic disease  5. Metabolic acidosis, positive anion gap: No evidence of DKA given beta hydroxybutyrate        Plan:  1. Treat for COPD aggressively, despite profound hypoxia, there is no hypercapnia and the patient is clearly able to hyperventilate  2. Continue supplemental oxygen as tolerated  3. Stop steroids as they may be aggravating the patient's mentation. 4. Home soon on oxygen. Care reviewed with the staff and the patient's family as available. Please note that voice recognition technology was used in the preparation of this note and make therefore it may contain inadvertent transcription errors. Herlinda Hayden M.D., F.C.C.P.     [facility], [address] [place] [address], [address] [place] [address]

## 2018-09-02 NOTE — PROGRESS NOTES
Called Dr. Mando Hampton regarding patient's agitation and confusion; left message on Dr. Mando Hampton regarding patient's condition

## 2018-09-02 NOTE — PROGRESS NOTES
Subjective: The patient is awake and alert. Very confused. Agitated overnight. Offers no complaints, but does not participate in medical interview. Denies chest pain, angina, and dyspnea. Denies abdominal pain. Tolerating diet. No nausea or vomiting. Objective:    /61   Pulse 106   Temp 98.1 °F (36.7 °C) (Oral)   Resp 20   Ht 5' 7\" (1.702 m)   Wt 200 lb (90.7 kg)   SpO2 90%   BMI 31.32 kg/m²     Current medications that patient is taking have been reviewed. Heart:  RRR, no murmurs, gallops, or rubs.   Lungs:  bilateral rhonchi  Abd: bowel sounds present, nontender, nondistended, no masses  Extrem:  No clubbing, cyanosis, or edema    CBC with Differential:    Lab Results   Component Value Date    WBC 4.3 09/01/2018    RBC 5.06 09/01/2018    HGB 14.2 09/01/2018    HCT 43.7 09/01/2018     09/01/2018    MCV 86.4 09/01/2018    MCH 28.1 09/01/2018    MCHC 32.5 09/01/2018    RDW 16.2 09/01/2018    LYMPHOPCT 14.9 09/01/2018    MONOPCT 0.9 09/01/2018    BASOPCT 0.0 09/01/2018    MONOSABS 0.04 09/01/2018    LYMPHSABS 0.64 09/01/2018    EOSABS 0.00 09/01/2018    BASOSABS 0.00 09/01/2018     CMP:    Lab Results   Component Value Date     09/01/2018    K 4.5 09/01/2018     09/01/2018    CO2 17 09/01/2018    BUN 30 09/01/2018    CREATININE 1.4 09/01/2018    GFRAA 59 09/01/2018    LABGLOM 48 09/01/2018    GLUCOSE 305 09/01/2018    PROT 7.3 09/01/2018    LABALBU 3.4 09/01/2018    CALCIUM 9.0 09/01/2018    BILITOT 0.5 09/01/2018    ALKPHOS 76 09/01/2018    AST 19 09/01/2018    ALT 23 09/01/2018     BMP:    Lab Results   Component Value Date     09/01/2018    K 4.5 09/01/2018     09/01/2018    CO2 17 09/01/2018    BUN 30 09/01/2018    LABALBU 3.4 09/01/2018    CREATININE 1.4 09/01/2018    CALCIUM 9.0 09/01/2018    GFRAA 59 09/01/2018    LABGLOM 48 09/01/2018    GLUCOSE 305 09/01/2018     Magnesium:    Lab Results   Component Value Date    MG 1.7 09/01/2018     Phosphorus:  No results found for: PHOS  PT/INR:    Lab Results   Component Value Date    PROTIME 50.2 09/02/2018    INR 4.6 09/02/2018     PTT:    Lab Results   Component Value Date    APTT 42.8 08/31/2018   [APTT}     Assessment:    Patient Active Problem List   Diagnosis    [facility] failure ([facility]    [facility]    Diabetes mellitus type 2, uncontrolled (HCC)    History of DVT (deep vein thrombosis)    Obesity (BMI 30-39. 9)    Dementia       Plan:  Secondary to COPD exacerbation. Continue breathing treatments and supplemental oxygen. Stop steroids secondary to confusion. Steroid psychosis? Blood glucose elevated secondary to steroids. Adjust basal/bolus insulin. Stable. Continue to encourage weight loss. Dementia with psychosis. Will ask psychiatry to see. Do not want to sedate with respiratory dysfunction. Follow psychiatry recommendations. Pt/Ot evaluations for discharge planning.     Dea Álvarez MD  1:14 PM  9/2/2018

## 2018-09-02 NOTE — PROGRESS NOTES
Pt came out of room agitated cursing at staff, went toward me said he is going to hurt me for checking on him. Explained to pt that I had to check on him and I need to make sure he has his oxygen for his safety. Pt still agitated went back to the room and closed the door.

## 2018-09-03 LAB
INR BLD: 4.8
METER GLUCOSE: 131 MG/DL (ref 70–110)
METER GLUCOSE: 146 MG/DL (ref 70–110)
METER GLUCOSE: 179 MG/DL (ref 70–110)
METER GLUCOSE: 186 MG/DL (ref 70–110)
PROTHROMBIN TIME: 53.6 SEC (ref 9.3–12.4)

## 2018-09-03 PROCEDURE — 6360000002 HC RX W HCPCS: Performed by: INTERNAL MEDICINE

## 2018-09-03 PROCEDURE — 1200000000 HC SEMI PRIVATE

## 2018-09-03 PROCEDURE — 97530 THERAPEUTIC ACTIVITIES: CPT

## 2018-09-03 PROCEDURE — 97162 PT EVAL MOD COMPLEX 30 MIN: CPT

## 2018-09-03 PROCEDURE — 85610 PROTHROMBIN TIME: CPT

## 2018-09-03 PROCEDURE — 2580000003 HC RX 258: Performed by: INTERNAL MEDICINE

## 2018-09-03 PROCEDURE — 82962 GLUCOSE BLOOD TEST: CPT

## 2018-09-03 PROCEDURE — 94640 AIRWAY INHALATION TREATMENT: CPT

## 2018-09-03 PROCEDURE — 97165 OT EVAL LOW COMPLEX 30 MIN: CPT | Performed by: OCCUPATIONAL THERAPIST

## 2018-09-03 PROCEDURE — 97161 PT EVAL LOW COMPLEX 20 MIN: CPT

## 2018-09-03 PROCEDURE — 6370000000 HC RX 637 (ALT 250 FOR IP): Performed by: INTERNAL MEDICINE

## 2018-09-03 PROCEDURE — 2700000000 HC OXYGEN THERAPY PER DAY

## 2018-09-03 PROCEDURE — G8987 SELF CARE CURRENT STATUS: HCPCS | Performed by: OCCUPATIONAL THERAPIST

## 2018-09-03 PROCEDURE — 36415 COLL VENOUS BLD VENIPUNCTURE: CPT

## 2018-09-03 RX ORDER — ACETAMINOPHEN 325 MG/1
650 TABLET ORAL EVERY 4 HOURS PRN
Status: DISCONTINUED | OUTPATIENT
Start: 2018-09-03 | End: 2018-09-04 | Stop reason: HOSPADM

## 2018-09-03 RX ADMIN — GUAIFENESIN 400 MG: 400 TABLET ORAL at 21:58

## 2018-09-03 RX ADMIN — Medication 10 ML: at 09:08

## 2018-09-03 RX ADMIN — INSULIN LISPRO 3 UNITS: 100 INJECTION, SOLUTION INTRAVENOUS; SUBCUTANEOUS at 17:49

## 2018-09-03 RX ADMIN — GUAIFENESIN 400 MG: 400 TABLET ORAL at 12:40

## 2018-09-03 RX ADMIN — ALLOPURINOL 100 MG: 100 TABLET ORAL at 09:07

## 2018-09-03 RX ADMIN — GUAIFENESIN 400 MG: 400 TABLET ORAL at 06:37

## 2018-09-03 RX ADMIN — TAMSULOSIN HYDROCHLORIDE 0.4 MG: 0.4 CAPSULE ORAL at 09:06

## 2018-09-03 RX ADMIN — IPRATROPIUM BROMIDE AND ALBUTEROL SULFATE 1 AMPULE: .5; 3 SOLUTION RESPIRATORY (INHALATION) at 12:12

## 2018-09-03 RX ADMIN — Medication 10 ML: at 20:23

## 2018-09-03 RX ADMIN — INSULIN LISPRO 3 UNITS: 100 INJECTION, SOLUTION INTRAVENOUS; SUBCUTANEOUS at 09:20

## 2018-09-03 RX ADMIN — INSULIN GLARGINE 40 UNITS: 100 INJECTION, SOLUTION SUBCUTANEOUS at 21:47

## 2018-09-03 RX ADMIN — TROSPIUM CHLORIDE 20 MG: 20 TABLET ORAL at 15:29

## 2018-09-03 RX ADMIN — DAPSONE 50 MG: 25 TABLET ORAL at 09:06

## 2018-09-03 RX ADMIN — CETIRIZINE HYDROCHLORIDE 10 MG: 10 TABLET, FILM COATED ORAL at 09:06

## 2018-09-03 RX ADMIN — ASPIRIN 81 MG 81 MG: 81 TABLET ORAL at 09:07

## 2018-09-03 RX ADMIN — LINAGLIPTIN 5 MG: 5 TABLET, FILM COATED ORAL at 09:06

## 2018-09-03 RX ADMIN — GLIMEPIRIDE 4 MG: 4 TABLET ORAL at 06:37

## 2018-09-03 RX ADMIN — TROSPIUM CHLORIDE 20 MG: 20 TABLET ORAL at 06:36

## 2018-09-03 RX ADMIN — PANTOPRAZOLE SODIUM 40 MG: 40 TABLET, DELAYED RELEASE ORAL at 06:37

## 2018-09-03 RX ADMIN — GUAIFENESIN 400 MG: 400 TABLET ORAL at 09:06

## 2018-09-03 RX ADMIN — DOXYCYCLINE HYCLATE 100 MG: 100 CAPSULE, GELATIN COATED ORAL at 09:06

## 2018-09-03 RX ADMIN — SIMVASTATIN 40 MG: 40 TABLET, FILM COATED ORAL at 20:20

## 2018-09-03 RX ADMIN — GUAIFENESIN 400 MG: 400 TABLET ORAL at 18:19

## 2018-09-03 RX ADMIN — DOXYCYCLINE HYCLATE 100 MG: 100 CAPSULE, GELATIN COATED ORAL at 20:20

## 2018-09-03 RX ADMIN — LOSARTAN POTASSIUM 25 MG: 25 TABLET, FILM COATED ORAL at 09:06

## 2018-09-03 RX ADMIN — FINASTERIDE 5 MG: 5 TABLET, FILM COATED ORAL at 09:05

## 2018-09-03 RX ADMIN — FORMOTEROL FUMARATE DIHYDRATE 20 MCG: 20 SOLUTION RESPIRATORY (INHALATION) at 20:44

## 2018-09-03 RX ADMIN — IPRATROPIUM BROMIDE AND ALBUTEROL SULFATE 1 AMPULE: .5; 3 SOLUTION RESPIRATORY (INHALATION) at 16:08

## 2018-09-03 RX ADMIN — GUAIFENESIN 400 MG: 400 TABLET ORAL at 15:29

## 2018-09-03 RX ADMIN — BUDESONIDE 500 MCG: 0.5 SUSPENSION RESPIRATORY (INHALATION) at 20:44

## 2018-09-03 ASSESSMENT — PAIN DESCRIPTION - LOCATION
LOCATION: BACK
LOCATION: BACK

## 2018-09-03 ASSESSMENT — PAIN DESCRIPTION - PAIN TYPE
TYPE: ACUTE PAIN;CHRONIC PAIN
TYPE: CHRONIC PAIN

## 2018-09-03 ASSESSMENT — PAIN SCALES - GENERAL: PAINLEVEL_OUTOF10: 0

## 2018-09-03 NOTE — PLAN OF CARE
Problem: Falls - Risk of:  Goal: Will remain free from falls  Will remain free from falls   Outcome: Met This Shift      Problem: Breathing Pattern - Ineffective:  Goal: Ability to achieve and maintain a regular respiratory rate will improve  Ability to achieve and maintain a regular respiratory rate will improve   Outcome: Ongoing

## 2018-09-03 NOTE — PROGRESS NOTES
glasses)  Hearing: Within functional limits    Orientation  Overall Orientation Status: Within Functional Limits     Balance  Sitting Balance: Supervision (seated edge of bed)  Standing Balance  Sit to stand: Stand by assistance  Stand to sit: Stand by assistance  Toilet Transfers  Toilet - Technique: Stand step  Toilet Transfer: Stand by assistance  ADL  Feeding: Supervision  Grooming: Supervision  UE Bathing: Supervision  LE Bathing: Moderate assistance  UE Dressing: Supervision  LE Dressing:  Moderate assistance  Toileting: Supervision  Additional Comments: increased sob with activity        Bed mobility  Supine to Sit: Stand by assistance  Transfers  Stand Step Transfers: Stand by assistance  Sit to stand: Stand by assistance  Stand to sit: Stand by assistance  Transfer Comments: without device        Perception  Overall Perceptual Status: WFL     Sensation  Overall Sensation Status: WFL        LUE PROM (degrees)  LUE PROM: Exceptions  L Shoulder Flex  0-180: 0 - 90 degrees  L Shoulder ABduction 0-180: 0 - 90 degrees  LUE AROM (degrees)  LUE AROM : Exceptions  L Shoulder Flexion 0-180: 0 - 90 degrees  L Shoulder ABduction 0-180: 0 - 90 degrees  Left Hand PROM (degrees)  Left Hand PROM: WFL  Left Hand AROM (degrees)  Left Hand AROM: WFL  RUE PROM (degrees)  RUE PROM: Exceptions  R Shoulder Flex  0-180: 0 - 90 degrees  R Shoulder ABduction 0-180: 0 - 90 degrees  R Shoulder Ext Rotation  0-90: 0 - 90 degrees  RUE AROM (degrees)  RUE AROM : Exceptions  R Shoulder Flexion 0-180: 0 - 90 degrees  R Shoulder ABduction 0-180: 0 - 90 degrees  Right Hand PROM (degrees)  Right Hand PROM: WFL  Right Hand AROM (degrees)  Right Hand AROM: WFL  LUE Strength  Gross LUE Strength: Exceptions to [facility]  L Shoulder Flex: 3-/5  L Shoulder ABduction: 3-/5  L Hand Grasp: 4/5  L Hand Release: 4/5  RUE Strength  Gross RUE Strength: Exceptions to [facility]  R Shoulder Flex: 3-/5  R Shoulder ABduction: 3-/5  R Hand Grasp: 4/5  R Hand Release: 4/5

## 2018-09-03 NOTE — PROGRESS NOTES
Pulmonary Progress Note    Admit Date: 2018  Hospital day                               PCP: Yoko Zhao MD    Chief Complaint (s):  Patient Active Problem List   Diagnosis    Acute respiratory failure ([facility]    COPD (chronic obstructive pulmonary disease) ([facility]    Diabetes mellitus type 2, uncontrolled ([facility]    History of DVT (deep vein thrombosis)    Obesity (BMI 30-39. 9)    Dementia       Subjective:  - Awake and alert, laying in bed. States he did get up and walk a little yesterday but is limited to what he is allowed to do in the hospital.    - Denies shortness of breath, wheezing, or cough today.  Adamant that he is not crazy as he was told yesterday      Vitals:  VITALS:  /75   Pulse 92   Temp 97.4 °F (36.3 °C) (Oral)   Resp 18   Ht 5' 7\" (1.702 m)   Wt 199 lb 7 oz (90.5 kg)   SpO2 91%   BMI 31.24 kg/m²     24HR INTAKE/OUTPUT:      Intake/Output Summary (Last 24 hours) at 18 0850  Last data filed at 18 0649   Gross per 24 hour   Intake              360 ml   Output              350 ml   Net               10 ml       24HR PULSE OXIMETRY RANGE:    SpO2  Av.6 %  Min: 83 %  Max: 93 %    Medications:  IV:   dextrose         Scheduled Meds:   insulin lispro  0-18 Units Subcutaneous TID WC    linagliptin  5 mg Oral Daily    allopurinol  100 mg Oral Daily    aspirin  81 mg Oral Daily    cetirizine  10 mg Oral Daily    dapsone  50 mg Oral Daily    finasteride  5 mg Oral Daily    glimepiride  4 mg Oral QAM AC    insulin glargine  40 Units Subcutaneous Nightly    pantoprazole  40 mg Oral QAM AC    losartan  25 mg Oral Daily    simvastatin  40 mg Oral Nightly    tamsulosin  0.4 mg Oral Daily    trospium  20 mg Oral BID AC    sodium chloride flush  10 mL Intravenous 2 times per day    ipratropium-albuterol  1 ampule Inhalation Q4H WA    doxycycline hyclate  100 mg Oral Q12H    insulin lispro  0-6 Units Subcutaneous Nightly    budesonide  500

## 2018-09-03 NOTE — PROGRESS NOTES
Subjective: The patient is awake and alert. Sitting up in a chair. No current distress. Denies any dyspnea. No chest pain. Remains on high flow O2 due to persistent hypoxia. Objective:    /74   Pulse 86   Temp 97.5 °F (36.4 °C) (Oral)   Resp 18   Ht 5' 7\" (1.702 m)   Wt 199 lb 7 oz (90.5 kg)   SpO2 93%   BMI 31.24 kg/m²     Current medications that patient is taking have been reviewed. Heart:  RRR, no murmurs, gallops, or rubs. Lungs:  Very diminished bilaterally; no audible wheeze, rales or rhonchi  Abd: bowel sounds present, nontender, nondistended, no masses  Extrem:  No clubbing, cyanosis, or edema    PT/INR:    Lab Results   Component Value Date    PROTIME 53.6 09/03/2018    INR 4.8 09/03/2018       Sugar flow sheet reviewed      Assessment:    Patient Active Problem List   Diagnosis    Acute on chronic hypoxic respiratory failure    COPD (chronic obstructive pulmonary disease) with acute exacerbation    Pulmonary fibrosis    Acute encephalopathy/delirium-due to hypoxia, meds (steroids) and dementia    Diabetes mellitus type 2, uncontrolled      History of DVT (deep vein thrombosis)-INR supra therapeutic this AM    Obesity, morbid    Diabetes mellitus 2, uncontrolled    Dementia, Alzheimer's type with behavioral disturbance       Plan:    1) continue aerosols as per pulmonary  2) taper high flow O2 as possible  3) off IV steroids due to delirium. No further bronchospasm to warrant use of IV steroids at this time. 4) home once O2 saturation improved  5) resume coumadin once INR <3  6) palliative care consult to assist with discussion regarding further care with patient/family. Given age, dementia, and pulmonary disease, long term prognosis likely poor.       Jim Vargas MD  2:02 PM  9/3/2018

## 2018-09-04 VITALS
TEMPERATURE: 97.8 F | OXYGEN SATURATION: 93 % | DIASTOLIC BLOOD PRESSURE: 70 MMHG | RESPIRATION RATE: 18 BRPM | BODY MASS INDEX: 31.3 KG/M2 | WEIGHT: 199.44 LBS | HEART RATE: 97 BPM | HEIGHT: 67 IN | SYSTOLIC BLOOD PRESSURE: 114 MMHG

## 2018-09-04 PROBLEM — F03.90 DEMENTIA (HCC): Chronic | Status: ACTIVE | Noted: 2018-09-02

## 2018-09-04 PROBLEM — J96.11 CHRONIC RESPIRATORY FAILURE WITH HYPOXIA (HCC): Chronic | Status: ACTIVE | Noted: 2018-09-04

## 2018-09-04 PROBLEM — J96.00 ACUTE RESPIRATORY FAILURE (HCC): Status: RESOLVED | Noted: 2018-08-31 | Resolved: 2018-09-04

## 2018-09-04 PROBLEM — G30.9 ALZHEIMER'S DEMENTIA WITH BEHAVIORAL DISTURBANCE (HCC): Chronic | Status: ACTIVE | Noted: 2018-09-04

## 2018-09-04 PROBLEM — F02.818 ALZHEIMER'S DEMENTIA WITH BEHAVIORAL DISTURBANCE (HCC): Chronic | Status: ACTIVE | Noted: 2018-09-04

## 2018-09-04 LAB
ANION GAP SERPL CALCULATED.3IONS-SCNC: 12 MMOL/L (ref 7–16)
BUN BLDV-MCNC: 56 MG/DL (ref 8–23)
CALCIUM SERPL-MCNC: 9.1 MG/DL (ref 8.6–10.2)
CHLORIDE BLD-SCNC: 108 MMOL/L (ref 98–107)
CO2: 20 MMOL/L (ref 22–29)
CREAT SERPL-MCNC: 1.7 MG/DL (ref 0.7–1.2)
GFR AFRICAN AMERICAN: 47
GFR NON-AFRICAN AMERICAN: 39 ML/MIN/1.73
GLUCOSE BLD-MCNC: 118 MG/DL (ref 74–109)
HCT VFR BLD CALC: 41.7 % (ref 37–54)
HEMOGLOBIN: 13.3 G/DL (ref 12.5–16.5)
INR BLD: 2.8
MCH RBC QN AUTO: 27.7 PG (ref 26–35)
MCHC RBC AUTO-ENTMCNC: 31.9 % (ref 32–34.5)
MCV RBC AUTO: 86.7 FL (ref 80–99.9)
METER GLUCOSE: 120 MG/DL (ref 70–110)
METER GLUCOSE: 154 MG/DL (ref 70–110)
METER GLUCOSE: 155 MG/DL (ref 70–110)
PDW BLD-RTO: 16.2 FL (ref 11.5–15)
PLATELET # BLD: 180 E9/L (ref 130–450)
PMV BLD AUTO: 10.2 FL (ref 7–12)
POTASSIUM SERPL-SCNC: 4.5 MMOL/L (ref 3.5–5)
PROTHROMBIN TIME: 30.6 SEC (ref 9.3–12.4)
RBC # BLD: 4.81 E12/L (ref 3.8–5.8)
SODIUM BLD-SCNC: 140 MMOL/L (ref 132–146)
WBC # BLD: 8.3 E9/L (ref 4.5–11.5)

## 2018-09-04 PROCEDURE — 36415 COLL VENOUS BLD VENIPUNCTURE: CPT

## 2018-09-04 PROCEDURE — 2580000003 HC RX 258

## 2018-09-04 PROCEDURE — 94640 AIRWAY INHALATION TREATMENT: CPT

## 2018-09-04 PROCEDURE — 82962 GLUCOSE BLOOD TEST: CPT

## 2018-09-04 PROCEDURE — 6370000000 HC RX 637 (ALT 250 FOR IP): Performed by: INTERNAL MEDICINE

## 2018-09-04 PROCEDURE — 85610 PROTHROMBIN TIME: CPT

## 2018-09-04 PROCEDURE — 99223 1ST HOSP IP/OBS HIGH 75: CPT | Performed by: PHYSICIAN ASSISTANT

## 2018-09-04 PROCEDURE — 6360000002 HC RX W HCPCS: Performed by: INTERNAL MEDICINE

## 2018-09-04 PROCEDURE — 2500000003 HC RX 250 WO HCPCS: Performed by: PSYCHIATRY & NEUROLOGY

## 2018-09-04 PROCEDURE — 80048 BASIC METABOLIC PNL TOTAL CA: CPT

## 2018-09-04 PROCEDURE — 2580000003 HC RX 258: Performed by: INTERNAL MEDICINE

## 2018-09-04 PROCEDURE — 85027 COMPLETE CBC AUTOMATED: CPT

## 2018-09-04 PROCEDURE — 2700000000 HC OXYGEN THERAPY PER DAY

## 2018-09-04 RX ORDER — ALBUTEROL SULFATE 2.5 MG/3ML
2.5 SOLUTION RESPIRATORY (INHALATION) 4 TIMES DAILY
Qty: 120 EACH | Refills: 0 | Status: SHIPPED | OUTPATIENT
Start: 2018-09-04 | End: 2019-11-20 | Stop reason: ALTCHOICE

## 2018-09-04 RX ORDER — WARFARIN SODIUM 2 MG/1
2 TABLET ORAL DAILY
Status: DISCONTINUED | OUTPATIENT
Start: 2018-09-04 | End: 2018-09-04 | Stop reason: HOSPADM

## 2018-09-04 RX ORDER — DOXYCYCLINE HYCLATE 100 MG/1
100 CAPSULE ORAL EVERY 12 HOURS
Qty: 10 CAPSULE | Refills: 0 | Status: SHIPPED | OUTPATIENT
Start: 2018-09-04 | End: 2018-09-09

## 2018-09-04 RX ORDER — BUDESONIDE 0.5 MG/2ML
500 INHALANT ORAL 2 TIMES DAILY
Qty: 60 AMPULE | Refills: 0 | Status: SHIPPED | OUTPATIENT
Start: 2018-09-04 | End: 2019-11-20 | Stop reason: ALTCHOICE

## 2018-09-04 RX ORDER — WARFARIN SODIUM 2 MG/1
TABLET ORAL
Qty: 30 TABLET | Refills: 0 | Status: SHIPPED | OUTPATIENT
Start: 2018-09-04 | End: 2019-10-30

## 2018-09-04 RX ADMIN — ALLOPURINOL 100 MG: 100 TABLET ORAL at 08:42

## 2018-09-04 RX ADMIN — GLIMEPIRIDE 4 MG: 4 TABLET ORAL at 06:16

## 2018-09-04 RX ADMIN — TROSPIUM CHLORIDE 20 MG: 20 TABLET ORAL at 17:13

## 2018-09-04 RX ADMIN — GUAIFENESIN 400 MG: 400 TABLET ORAL at 06:16

## 2018-09-04 RX ADMIN — INSULIN LISPRO 3 UNITS: 100 INJECTION, SOLUTION INTRAVENOUS; SUBCUTANEOUS at 12:31

## 2018-09-04 RX ADMIN — DOXYCYCLINE HYCLATE 100 MG: 100 CAPSULE, GELATIN COATED ORAL at 08:41

## 2018-09-04 RX ADMIN — FINASTERIDE 5 MG: 5 TABLET, FILM COATED ORAL at 08:41

## 2018-09-04 RX ADMIN — TAMSULOSIN HYDROCHLORIDE 0.4 MG: 0.4 CAPSULE ORAL at 08:42

## 2018-09-04 RX ADMIN — LINAGLIPTIN 5 MG: 5 TABLET, FILM COATED ORAL at 08:41

## 2018-09-04 RX ADMIN — OLANZAPINE 5 MG: 10 INJECTION, POWDER, FOR SOLUTION INTRAMUSCULAR at 11:23

## 2018-09-04 RX ADMIN — IPRATROPIUM BROMIDE AND ALBUTEROL SULFATE 1 AMPULE: .5; 3 SOLUTION RESPIRATORY (INHALATION) at 12:57

## 2018-09-04 RX ADMIN — TROSPIUM CHLORIDE 20 MG: 20 TABLET ORAL at 06:16

## 2018-09-04 RX ADMIN — GUAIFENESIN 400 MG: 400 TABLET ORAL at 08:41

## 2018-09-04 RX ADMIN — CETIRIZINE HYDROCHLORIDE 10 MG: 10 TABLET, FILM COATED ORAL at 08:42

## 2018-09-04 RX ADMIN — PANTOPRAZOLE SODIUM 40 MG: 40 TABLET, DELAYED RELEASE ORAL at 06:16

## 2018-09-04 RX ADMIN — DAPSONE 50 MG: 25 TABLET ORAL at 08:42

## 2018-09-04 RX ADMIN — GUAIFENESIN 400 MG: 400 TABLET ORAL at 13:48

## 2018-09-04 RX ADMIN — ASPIRIN 81 MG 81 MG: 81 TABLET ORAL at 08:42

## 2018-09-04 RX ADMIN — Medication 10 ML: at 08:42

## 2018-09-04 RX ADMIN — GUAIFENESIN 400 MG: 400 TABLET ORAL at 17:13

## 2018-09-04 RX ADMIN — WATER 10 ML: 1 INJECTION INTRAMUSCULAR; INTRAVENOUS; SUBCUTANEOUS at 11:23

## 2018-09-04 RX ADMIN — IPRATROPIUM BROMIDE AND ALBUTEROL SULFATE 1 AMPULE: .5; 3 SOLUTION RESPIRATORY (INHALATION) at 16:26

## 2018-09-04 RX ADMIN — LOSARTAN POTASSIUM 25 MG: 25 TABLET, FILM COATED ORAL at 08:42

## 2018-09-04 RX ADMIN — WARFARIN SODIUM 2 MG: 2 TABLET ORAL at 17:13

## 2018-09-04 ASSESSMENT — PAIN SCALES - GENERAL: PAINLEVEL_OUTOF10: 0

## 2018-09-04 NOTE — CONSULTS
Palliative Care Department  Palliative Care Initial Consult  Provider: [facility] days prior to Consult: Hospital Day: 5    Referring Provider:  Cristine Coulter MD    Reason for Consult:  [x]  Code status Discussion  [x]  Assist with goals of care  [x]  Psychosocial support  []  Symptom Management      Chief Complaint: Shortness of breath    Subjective:     HPI:  Charu Adams is a 81 y.o. male with significant past medical history of COPD, DM, HTN, HLD who presented to the ED with SOB and admitted with Community acquired pneumonia, COPD exacerbation, acute reparatory failure with hypoxia and supratherapeutic INR. Carlton's pneumonia and COPD exacerbation have been treated. He developed some agitation and psychiatry was consulted. Patient, wife and daughter present in the room. They are waiting for discharge home. He has dementia and daughter reports it has been worsening in the past couple months. He became very agitated during this admission and gets frustrate when he doesn't understand why things are being done. He relays that he has emphysema and he is not getting better so he didn't understand what we were doing to him. He reports dyspnea on exertion and chronic arthritis pain. He is independent for most ADL's but has started to require some assist.      Past Medical History:   Diagnosis Date    COPD (chronic obstructive pulmonary disease) ([address]     Diabetes mellitus ([address]     Hyperlipidemia     Hypertension        History reviewed. No pertinent surgical history. History reviewed. No pertinent family history.     Social history:  Marital status:   Living status: with family:  spouse   Work history:     History obtain from EMR, family    Current Medications:  Inpatient medications reviewed: yes  Home Medications reviewed: yes    Allergies   Allergen Reactions    Penicillin G     Penicillins Hives    Tetanus Toxoids Hives       Objective:   PHYSICAL EXAM:   Vitals:    /70   Pulse 97   Temp 97.8 °F (36.6 °C) (Oral)   Resp 18   Ht 5' 7\" (1.702 m)   Wt 199 lb 7 oz (90.5 kg)   SpO2 93%   BMI 31.24 kg/m²   Gen: obese, alert but confused, cough    HEENT: normocephalic, atraumatic, PERRL, EOMI, MMM without ulcerations or lesions, sclera anicteric, conjunctiva pink and moist  Neck: supple  Lungs: respirations easy, no conversational dyspnea, decreased breath sounds, some coarse breath sounds but coughing and difficult to assess, barrel chested   Heart: regular rate and rhythm, distant heart tones  Abdomen: normoactive bowel sounds, soft, non-tender, non-distended  Extremities: edema legs bilaterally  Skin: warm, dry without rashes, lesions, bruising  Neuro: awake, alert, oriented x 3 but makes inappropriate and incorrect comments at times, follows commands, no gross neurologic deficits    Results/Verification of Data Review  Objective data reviewed: labs, images, records, medication use, vitals and chart      Assessment/Plan      Active Hospital Problems    Diagnosis Date Noted    Chronic respiratory failure with hypoxia (HCC) [J96.11] 09/04/2018    Alzheimer's dementia with behavioral disturbance [G30.9, F02.81] 09/04/2018    Obesity (BMI 30-39. 9) [E66.9] 09/01/2018    [facility] ([facility]) [facility] [J44.9] 08/31/2018    Diabetes mellitus type 2, uncontrolled ([facility] [E11.65] 08/31/2018    History of DVT (deep vein thrombosis) [Z86.718] 08/31/2018       COPD/emphysema:  Patient with advanced lung disease on continuous O2 with ZHENG, mild. He likely qualifies for hospice services and would greatly benefit due to his significant agitation with hospital admissions.  - Outpatient palliative medicine community follow-up, hopefully able to coordinate transition to hospice. Discussed with patient's daughter and she felt her mother may be receptive.   Her mother was very interested in palliative medicine services    Dementia: Patient has present.

## 2018-09-04 NOTE — PROGRESS NOTES
behavioral disturbance       Plan:    1) continue aerosols as per pulmonary  2) continue high flow O2 and taper as possible  3) the patient reports that he lives with his wife. I tried to contact her to discuss his case further but she was unavailable at number listed on chart. As long as he has 24 hour care and he can obtain appropriate liter flow of O2 at home to maintain O2 saturation, then he can discharge home. I discussed with DR Wilson Plan this AM, and likely there is nothing further that can be done with medications/testing here that will improve his COPD any further.   4) resume coumadin and dose to keep INR 2-3  5) follow up with PCP within one week if discharged home today      Mehrdad Han MD  11:53 AM  9/4/2018

## 2018-09-04 NOTE — DISCHARGE SUMMARY
Physician Discharge Summary     Patient ID:  Leonard Cohen  [id number]  63 y.o.  1935    Admit date: 8/31/2018    Discharge date and time:  Sept 4, 2018    Admission Diagnoses:   Patient Active Problem List   Diagnosis    Acute on chronic hypoxic respiratory failure    COPD (chronic obstructive pulmonary disease) with acute exacerbation    Pulmonary fibrosis    Diabetes mellitus type 2, uncontrolled      History of DVT (deep vein thrombosis)     Obesity, morbid    Diabetes mellitus 2, uncontrolled    Dementia, Alzheimer's type with behavioral disturbance       Discharge Diagnoses:   Patient Active Problem List   Diagnosis    Acute on chronic hypoxic respiratory failure    COPD (chronic obstructive pulmonary disease) with acute exacerbation    Pulmonary fibrosis    Acute encephalopathy/delirium-due to hypoxia, meds (steroids) and dementia    Diabetes mellitus type 2, uncontrolled      History of DVT (deep vein thrombosis)-INR supra therapeutic this AM    Obesity, morbid    Diabetes mellitus 2, uncontrolled    Dementia, Alzheimer's type with behavioral disturbance       Consults: pulmonary/intensive care (Rustam) and psychiatry (Genie)    Procedures: none    Hospital Course: the patient is a 81 y.o. white man followed by DR Louis who presents secondary to shortness of breath. He has severe COPD. He has dementia. He was not compliant with O2 at home. He was found to be hypoxic. Clinical exam suggested COPD exacerbation. He was admitted and started on IV steroids and aerosols. Pulmonary assisted in his care. During his stay, he had sundowning and significant combativeness. Psychiatry was called to assist in management of his delirium/dementia. Medications for agitation (zyprexa) was given as needed. IV steroids were stopped as it was suspected it was worsening his confusion. Imaging demonstrated severe COPD and fibrosis.   It was believed that his lung condition was chronic and terminal. He was recommended to be maintained on aerosols and high flow O2 as tolerated. The patient became more frustrated and upset with staying in the hospital, so once appropriate nasal cannula O2 was set up for home use, he was discharged to home.     Discharge Exam:  See progress note from today    Disposition: home    Patient Instructions:   Current Discharge Medication List      START taking these medications    Details   budesonide (PULMICORT) 0.5 MG/2ML nebulizer suspension Take 2 mLs by nebulization 2 times daily DX COPD  Qty: 60 ampule, Refills: 0      doxycycline hyclate (VIBRAMYCIN) 100 MG capsule Take 1 capsule by mouth every 12 hours for 5 days  Qty: 10 capsule, Refills: 0      albuterol (PROVENTIL) (2.5 MG/3ML) 0.083% nebulizer solution Take 3 mLs by nebulization 4 times daily  Qty: 120 each, Refills: 0         CONTINUE these medications which have CHANGED    Details   warfarin (COUMADIN) 2 MG tablet Take one tablet oral daily  Qty: 30 tablet, Refills: 0         CONTINUE these medications which have NOT CHANGED    Details   allopurinol (ZYLOPRIM) 100 MG tablet Take 100 mg by mouth daily      aspirin 81 MG tablet Take 81 mg by mouth daily      tolterodine (DETROL LA) 4 MG extended release capsule Take 4 mg by mouth daily      finasteride (PROSCAR) 5 MG tablet Take 5 mg by mouth daily      tamsulosin (FLOMAX) 0.4 MG capsule Take 0.4 mg by mouth daily      glimepiride (AMARYL) 4 MG tablet Take 4 mg by mouth every morning (before breakfast)      lansoprazole (PREVACID) 30 MG delayed release capsule Take 30 mg by mouth daily      insulin glargine (LANTUS) 100 UNIT/ML injection vial Inject 40 Units into the skin nightly      losartan (COZAAR) 25 MG tablet Take 25 mg by mouth daily      guaiFENesin (MUCINEX) 600 MG extended release tablet Take 1,200 mg by mouth 2 times daily      simvastatin (ZOCOR) 40 MG tablet Take 40 mg by mouth nightly      HYDROcodone-acetaminophen (NORCO) 7.5-325 MG per tablet Take 1 tablet

## 2018-09-04 NOTE — PROGRESS NOTES
Received call from Jennifer Vaz with [facility]. Updated her on admission date and diagnosis. She will be faxing over information from home health.

## 2018-09-04 NOTE — PROGRESS NOTES
CONTROL;     EXAM:  General: No distress. Alert. Eyes: PERRL. No sclera icterus. No conjunctival injection. ENT: No discharge. Pharynx clear. Neck: Trachea midline. Normal thyroid. Resp: No accessory muscle use. Bilateral bibasilar rales. No wheezing. No rhonchi. CV: Regular rate. Regular rhythm. No murmur or rub. Abd: Non-tender. Non-distended. No masses. No organomegaly. Normal bowel sounds. Skin: Warm and dry. No nodule on exposed extremities. No rash on exposed extremities. Ext: No cyanosis, clubbing, edema  Lymph: No cervical LAD. No supraclavicular LAD. M/S: No cyanosis. No joint deformity. No clubbing. Neuro: Awake. Follows commands. Positive pupils/gag/corneals. Normal pain response. Results:  CBC:   Recent Labs      09/04/18   0446   WBC  8.3   HGB  13.3   HCT  41.7   MCV  86.7   PLT  180     BMP:   Recent Labs      09/04/18   0446   NA  140   K  4.5   CL  108*   CO2  20*   BUN  56*   CREATININE  1.7*     LIVER PROFILE:   No results for input(s): AST, ALT, LIPASE, BILIDIR, BILITOT, ALKPHOS in the last 72 hours. Invalid input(s): AMYLASE,  ALB  PT/INR:   Recent Labs      09/02/18   1145  09/03/18   0525  09/04/18   0446   PROTIME  50.2*  53.6*  30.6*   INR  4.6  4.8  2.8     APTT:   No results for input(s): APTT in the last 72 hours. Pathology:  1. N/A      Microbiology:  1. None    Recent ABG:   Recent Labs      09/01/18   1520   PH  7.424   PO2  61.9   PCO2  21.6*   HCO3  13.8*   BE  -8.0*   O2SAT  92.6   METHB  0.6   O2HB  90.8*   COHB  1.3   O2CON  19.8   HHB  7.3*   THB  15.5             Recent Films:  CT CHEST W CONTRAST   Final Result   Presence of emphysema changes with prominent fair   widespread areas of pulmonary fibrosis as above commented. XR CHEST PORTABLE   Final Result      Groundglass opacity in the right parahilar region, can represent an   acute pneumonic process. Cannot exclude also infiltrate in the left lower lobe posteriorly.

## 2018-09-06 LAB
6AM URINE: <10 NG/ML
CODEINE, URINE: <20 NG/ML
HYDROCODONE, URINE: 1586 NG/ML
HYDROMORPHONE, URINE: <20 NG/ML
MORPHINE URINE: <20 NG/ML
NORHYDROCODONE, URINE: 744 NG/ML
NOROXYCODONE, URINE: <20 NG/ML
NOROXYMORPHONE, URINE: <20 NG/ML
OXYCODONE, URINE CONFIRMATION: <20 NG/ML
OXYMORPHONE, URINE: <20 NG/ML

## 2018-09-08 LAB
EKG ATRIAL RATE: 93 BPM
EKG P AXIS: 46 DEGREES
EKG P-R INTERVAL: 202 MS
EKG Q-T INTERVAL: 392 MS
EKG QRS DURATION: 122 MS
EKG QTC CALCULATION (BAZETT): 487 MS
EKG R AXIS: 93 DEGREES
EKG T AXIS: 42 DEGREES
EKG VENTRICULAR RATE: 93 BPM

## 2019-09-23 ENCOUNTER — HOSPITAL ENCOUNTER (INPATIENT)
Age: 84
LOS: 4 days | Discharge: HOME | DRG: 659 | End: 2019-09-27
Attending: EMERGENCY MEDICINE | Admitting: INTERNAL MEDICINE
Payer: MEDICARE

## 2019-09-23 ENCOUNTER — APPOINTMENT (OUTPATIENT)
Dept: CT IMAGING | Age: 84
DRG: 659 | End: 2019-09-23
Payer: MEDICARE

## 2019-09-23 DIAGNOSIS — Z79.01 ANTICOAGULATED ON WARFARIN: ICD-10-CM

## 2019-09-23 DIAGNOSIS — N18.3 ACUTE RENAL FAILURE SUPERIMPOSED ON STAGE 3 CHRONIC KIDNEY DISEASE, UNSPECIFIED ACUTE RENAL FAILURE TYPE: Primary | ICD-10-CM

## 2019-09-23 DIAGNOSIS — R52 PAIN: ICD-10-CM

## 2019-09-23 DIAGNOSIS — N30.01 ACUTE CYSTITIS WITH HEMATURIA: ICD-10-CM

## 2019-09-23 DIAGNOSIS — N13.2 URETERAL STONE WITH HYDRONEPHROSIS: ICD-10-CM

## 2019-09-23 DIAGNOSIS — N17.9 ACUTE RENAL FAILURE SUPERIMPOSED ON STAGE 3 CHRONIC KIDNEY DISEASE, UNSPECIFIED ACUTE RENAL FAILURE TYPE: Primary | ICD-10-CM

## 2019-09-23 PROBLEM — N20.1 URETEROLITHIASIS: Status: ACTIVE | Noted: 2019-09-23

## 2019-09-23 LAB
ALBUMIN SERPL-MCNC: 4.1 G/DL (ref 3.5–5.2)
ALP BLD-CCNC: 77 U/L (ref 40–129)
ALT SERPL-CCNC: 12 U/L (ref 0–40)
ANION GAP SERPL CALCULATED.3IONS-SCNC: 16 MMOL/L (ref 7–16)
APTT: 33.8 SEC (ref 24.5–35.1)
AST SERPL-CCNC: 13 U/L (ref 0–39)
BACTERIA: ABNORMAL /HPF
BASOPHILS ABSOLUTE: 0.03 E9/L (ref 0–0.2)
BASOPHILS RELATIVE PERCENT: 0.2 % (ref 0–2)
BILIRUB SERPL-MCNC: 1 MG/DL (ref 0–1.2)
BILIRUBIN URINE: NEGATIVE
BLOOD, URINE: ABNORMAL
BUN BLDV-MCNC: 41 MG/DL (ref 8–23)
CALCIUM SERPL-MCNC: 8.7 MG/DL (ref 8.6–10.2)
CASTS: ABNORMAL /LPF
CHLORIDE BLD-SCNC: 99 MMOL/L (ref 98–107)
CLARITY: CLEAR
CO2: 23 MMOL/L (ref 22–29)
COLOR: YELLOW
CREAT SERPL-MCNC: 3.6 MG/DL (ref 0.7–1.2)
EOSINOPHILS ABSOLUTE: 0.01 E9/L (ref 0.05–0.5)
EOSINOPHILS RELATIVE PERCENT: 0.1 % (ref 0–6)
EPITHELIAL CELLS, UA: ABNORMAL /HPF
GFR AFRICAN AMERICAN: 20
GFR NON-AFRICAN AMERICAN: 16 ML/MIN/1.73
GLUCOSE BLD-MCNC: 243 MG/DL (ref 74–99)
GLUCOSE URINE: 100 MG/DL
HCT VFR BLD CALC: 44.8 % (ref 37–54)
HEMOGLOBIN: 14.7 G/DL (ref 12.5–16.5)
IMMATURE GRANULOCYTES #: 0.11 E9/L
IMMATURE GRANULOCYTES %: 0.8 % (ref 0–5)
INR BLD: 2.7
KETONES, URINE: NEGATIVE MG/DL
LACTIC ACID: 1.4 MMOL/L (ref 0.5–2.2)
LEUKOCYTE ESTERASE, URINE: NEGATIVE
LIPASE: 14 U/L (ref 13–60)
LYMPHOCYTES ABSOLUTE: 1.17 E9/L (ref 1.5–4)
LYMPHOCYTES RELATIVE PERCENT: 8.2 % (ref 20–42)
MCH RBC QN AUTO: 29.9 PG (ref 26–35)
MCHC RBC AUTO-ENTMCNC: 32.8 % (ref 32–34.5)
MCV RBC AUTO: 91.2 FL (ref 80–99.9)
METER GLUCOSE: 194 MG/DL (ref 74–99)
MONOCYTES ABSOLUTE: 1.3 E9/L (ref 0.1–0.95)
MONOCYTES RELATIVE PERCENT: 9.1 % (ref 2–12)
NEUTROPHILS ABSOLUTE: 11.59 E9/L (ref 1.8–7.3)
NEUTROPHILS RELATIVE PERCENT: 81.6 % (ref 43–80)
NITRITE, URINE: NEGATIVE
PDW BLD-RTO: 14.6 FL (ref 11.5–15)
PH UA: 5.5 (ref 5–9)
PLATELET # BLD: 127 E9/L (ref 130–450)
PMV BLD AUTO: 10.1 FL (ref 7–12)
POTASSIUM SERPL-SCNC: 4.4 MMOL/L (ref 3.5–5)
PROTEIN UA: 100 MG/DL
PROTHROMBIN TIME: 33 SEC (ref 9.3–12.4)
RBC # BLD: 4.91 E12/L (ref 3.8–5.8)
RBC UA: ABNORMAL /HPF (ref 0–2)
SODIUM BLD-SCNC: 138 MMOL/L (ref 132–146)
SPECIFIC GRAVITY UA: >=1.03 (ref 1–1.03)
TOTAL PROTEIN: 8 G/DL (ref 6.4–8.3)
TROPONIN: 0.02 NG/ML (ref 0–0.03)
UROBILINOGEN, URINE: 0.2 E.U./DL
WBC # BLD: 14.2 E9/L (ref 4.5–11.5)
WBC UA: ABNORMAL /HPF (ref 0–5)

## 2019-09-23 PROCEDURE — 74176 CT ABD & PELVIS W/O CONTRAST: CPT

## 2019-09-23 PROCEDURE — 85025 COMPLETE CBC W/AUTO DIFF WBC: CPT

## 2019-09-23 PROCEDURE — 82962 GLUCOSE BLOOD TEST: CPT

## 2019-09-23 PROCEDURE — 96375 TX/PRO/DX INJ NEW DRUG ADDON: CPT

## 2019-09-23 PROCEDURE — 6370000000 HC RX 637 (ALT 250 FOR IP): Performed by: PHYSICIAN ASSISTANT

## 2019-09-23 PROCEDURE — 36415 COLL VENOUS BLD VENIPUNCTURE: CPT

## 2019-09-23 PROCEDURE — 80053 COMPREHEN METABOLIC PANEL: CPT

## 2019-09-23 PROCEDURE — 94664 DEMO&/EVAL PT USE INHALER: CPT

## 2019-09-23 PROCEDURE — 83690 ASSAY OF LIPASE: CPT

## 2019-09-23 PROCEDURE — 6360000002 HC RX W HCPCS: Performed by: PHYSICIAN ASSISTANT

## 2019-09-23 PROCEDURE — 6360000002 HC RX W HCPCS: Performed by: NURSE PRACTITIONER

## 2019-09-23 PROCEDURE — BT1F1ZZ FLUOROSCOPY OF LEFT KIDNEY, URETER AND BLADDER USING LOW OSMOLAR CONTRAST: ICD-10-PCS | Performed by: UROLOGY

## 2019-09-23 PROCEDURE — 99285 EMERGENCY DEPT VISIT HI MDM: CPT

## 2019-09-23 PROCEDURE — 94761 N-INVAS EAR/PLS OXIMETRY MLT: CPT

## 2019-09-23 PROCEDURE — 0T778DZ DILATION OF LEFT URETER WITH INTRALUMINAL DEVICE, VIA NATURAL OR ARTIFICIAL OPENING ENDOSCOPIC: ICD-10-PCS | Performed by: UROLOGY

## 2019-09-23 PROCEDURE — 94640 AIRWAY INHALATION TREATMENT: CPT

## 2019-09-23 PROCEDURE — 83605 ASSAY OF LACTIC ACID: CPT

## 2019-09-23 PROCEDURE — 2580000003 HC RX 258: Performed by: NURSE PRACTITIONER

## 2019-09-23 PROCEDURE — 6370000000 HC RX 637 (ALT 250 FOR IP): Performed by: UROLOGY

## 2019-09-23 PROCEDURE — 2580000003 HC RX 258: Performed by: PHYSICIAN ASSISTANT

## 2019-09-23 PROCEDURE — 2700000000 HC OXYGEN THERAPY PER DAY

## 2019-09-23 PROCEDURE — 85610 PROTHROMBIN TIME: CPT

## 2019-09-23 PROCEDURE — 96374 THER/PROPH/DIAG INJ IV PUSH: CPT

## 2019-09-23 PROCEDURE — 84484 ASSAY OF TROPONIN QUANT: CPT

## 2019-09-23 PROCEDURE — 81001 URINALYSIS AUTO W/SCOPE: CPT

## 2019-09-23 PROCEDURE — 87040 BLOOD CULTURE FOR BACTERIA: CPT

## 2019-09-23 PROCEDURE — 1200000000 HC SEMI PRIVATE

## 2019-09-23 PROCEDURE — 93005 ELECTROCARDIOGRAM TRACING: CPT | Performed by: PHYSICIAN ASSISTANT

## 2019-09-23 PROCEDURE — 85730 THROMBOPLASTIN TIME PARTIAL: CPT

## 2019-09-23 RX ORDER — DEXTROSE MONOHYDRATE 50 MG/ML
100 INJECTION, SOLUTION INTRAVENOUS PRN
Status: DISCONTINUED | OUTPATIENT
Start: 2019-09-23 | End: 2019-09-27 | Stop reason: HOSPADM

## 2019-09-23 RX ORDER — SIMVASTATIN 40 MG
40 TABLET ORAL NIGHTLY
Status: DISCONTINUED | OUTPATIENT
Start: 2019-09-23 | End: 2019-09-27 | Stop reason: HOSPADM

## 2019-09-23 RX ORDER — FENTANYL CITRATE 50 UG/ML
25 INJECTION, SOLUTION INTRAMUSCULAR; INTRAVENOUS ONCE
Status: COMPLETED | OUTPATIENT
Start: 2019-09-23 | End: 2019-09-23

## 2019-09-23 RX ORDER — ONDANSETRON 2 MG/ML
4 INJECTION INTRAMUSCULAR; INTRAVENOUS ONCE
Status: COMPLETED | OUTPATIENT
Start: 2019-09-23 | End: 2019-09-23

## 2019-09-23 RX ORDER — SODIUM CHLORIDE 0.9 % (FLUSH) 0.9 %
10 SYRINGE (ML) INJECTION PRN
Status: DISCONTINUED | OUTPATIENT
Start: 2019-09-23 | End: 2019-09-27 | Stop reason: HOSPADM

## 2019-09-23 RX ORDER — ONDANSETRON 2 MG/ML
4 INJECTION INTRAMUSCULAR; INTRAVENOUS EVERY 6 HOURS PRN
Status: DISCONTINUED | OUTPATIENT
Start: 2019-09-23 | End: 2019-09-27 | Stop reason: HOSPADM

## 2019-09-23 RX ORDER — POLYETHYLENE GLYCOL 3350 17 G/17G
17 POWDER, FOR SOLUTION ORAL DAILY PRN
Status: DISCONTINUED | OUTPATIENT
Start: 2019-09-23 | End: 2019-09-27 | Stop reason: HOSPADM

## 2019-09-23 RX ORDER — DAPSONE 25 MG/1
50 TABLET ORAL DAILY
Status: DISCONTINUED | OUTPATIENT
Start: 2019-09-23 | End: 2019-09-27 | Stop reason: HOSPADM

## 2019-09-23 RX ORDER — LOSARTAN POTASSIUM 25 MG/1
25 TABLET ORAL DAILY
Status: DISCONTINUED | OUTPATIENT
Start: 2019-09-23 | End: 2019-09-27 | Stop reason: HOSPADM

## 2019-09-23 RX ORDER — DEXTROSE MONOHYDRATE 25 G/50ML
12.5 INJECTION, SOLUTION INTRAVENOUS PRN
Status: DISCONTINUED | OUTPATIENT
Start: 2019-09-23 | End: 2019-09-27 | Stop reason: HOSPADM

## 2019-09-23 RX ORDER — SODIUM CHLORIDE 0.9 % (FLUSH) 0.9 %
10 SYRINGE (ML) INJECTION EVERY 12 HOURS SCHEDULED
Status: DISCONTINUED | OUTPATIENT
Start: 2019-09-23 | End: 2019-09-27 | Stop reason: HOSPADM

## 2019-09-23 RX ORDER — ACETAMINOPHEN 325 MG/1
650 TABLET ORAL EVERY 4 HOURS PRN
Status: DISCONTINUED | OUTPATIENT
Start: 2019-09-23 | End: 2019-09-27 | Stop reason: HOSPADM

## 2019-09-23 RX ORDER — TROSPIUM CHLORIDE 20 MG/1
20 TABLET, FILM COATED ORAL DAILY
Status: DISCONTINUED | OUTPATIENT
Start: 2019-09-23 | End: 2019-09-27 | Stop reason: HOSPADM

## 2019-09-23 RX ORDER — NICOTINE POLACRILEX 4 MG
15 LOZENGE BUCCAL PRN
Status: DISCONTINUED | OUTPATIENT
Start: 2019-09-23 | End: 2019-09-27 | Stop reason: HOSPADM

## 2019-09-23 RX ORDER — HYDROCODONE BITARTRATE AND ACETAMINOPHEN 7.5; 325 MG/1; MG/1
1 TABLET ORAL EVERY 6 HOURS PRN
Status: DISCONTINUED | OUTPATIENT
Start: 2019-09-23 | End: 2019-09-27 | Stop reason: HOSPADM

## 2019-09-23 RX ORDER — INSULIN GLARGINE 100 [IU]/ML
40 INJECTION, SOLUTION SUBCUTANEOUS NIGHTLY
Status: DISCONTINUED | OUTPATIENT
Start: 2019-09-23 | End: 2019-09-27 | Stop reason: HOSPADM

## 2019-09-23 RX ORDER — ALBUTEROL SULFATE 2.5 MG/3ML
2.5 SOLUTION RESPIRATORY (INHALATION) 4 TIMES DAILY
Status: DISCONTINUED | OUTPATIENT
Start: 2019-09-23 | End: 2019-09-24

## 2019-09-23 RX ORDER — TAMSULOSIN HYDROCHLORIDE 0.4 MG/1
0.4 CAPSULE ORAL DAILY
Status: DISCONTINUED | OUTPATIENT
Start: 2019-09-23 | End: 2019-09-27 | Stop reason: HOSPADM

## 2019-09-23 RX ORDER — SODIUM CHLORIDE 9 MG/ML
INJECTION, SOLUTION INTRAVENOUS ONCE
Status: COMPLETED | OUTPATIENT
Start: 2019-09-23 | End: 2019-09-23

## 2019-09-23 RX ORDER — SODIUM CHLORIDE 9 MG/ML
INJECTION, SOLUTION INTRAVENOUS CONTINUOUS
Status: DISCONTINUED | OUTPATIENT
Start: 2019-09-23 | End: 2019-09-25

## 2019-09-23 RX ORDER — ASPIRIN 81 MG/1
81 TABLET, CHEWABLE ORAL DAILY
Status: DISCONTINUED | OUTPATIENT
Start: 2019-09-23 | End: 2019-09-27 | Stop reason: HOSPADM

## 2019-09-23 RX ORDER — BUDESONIDE 0.5 MG/2ML
500 INHALANT ORAL 2 TIMES DAILY
Status: DISCONTINUED | OUTPATIENT
Start: 2019-09-23 | End: 2019-09-27 | Stop reason: HOSPADM

## 2019-09-23 RX ORDER — GLIMEPIRIDE 4 MG/1
4 TABLET ORAL
Status: DISCONTINUED | OUTPATIENT
Start: 2019-09-24 | End: 2019-09-27 | Stop reason: HOSPADM

## 2019-09-23 RX ADMIN — SODIUM CHLORIDE: 9 INJECTION, SOLUTION INTRAVENOUS at 20:50

## 2019-09-23 RX ADMIN — TAMSULOSIN HYDROCHLORIDE 0.4 MG: 0.4 CAPSULE ORAL at 19:57

## 2019-09-23 RX ADMIN — FENTANYL CITRATE 25 MCG: 50 INJECTION INTRAMUSCULAR; INTRAVENOUS at 17:48

## 2019-09-23 RX ADMIN — INSULIN GLARGINE 40 UNITS: 100 INJECTION, SOLUTION SUBCUTANEOUS at 21:51

## 2019-09-23 RX ADMIN — ONDANSETRON 4 MG: 2 INJECTION INTRAMUSCULAR; INTRAVENOUS at 17:51

## 2019-09-23 RX ADMIN — DAPSONE 50 MG: 25 TABLET ORAL at 21:50

## 2019-09-23 RX ADMIN — BUDESONIDE 500 MCG: 0.5 SUSPENSION RESPIRATORY (INHALATION) at 21:10

## 2019-09-23 RX ADMIN — INSULIN LISPRO 1 UNITS: 100 INJECTION, SOLUTION INTRAVENOUS; SUBCUTANEOUS at 21:50

## 2019-09-23 RX ADMIN — SIMVASTATIN 40 MG: 40 TABLET, FILM COATED ORAL at 21:50

## 2019-09-23 RX ADMIN — ALBUTEROL SULFATE 2.5 MG: 2.5 SOLUTION RESPIRATORY (INHALATION) at 21:10

## 2019-09-23 RX ADMIN — ASPIRIN 81 MG 81 MG: 81 TABLET ORAL at 21:50

## 2019-09-23 RX ADMIN — FENTANYL CITRATE 25 MCG: 50 INJECTION INTRAMUSCULAR; INTRAVENOUS at 19:58

## 2019-09-23 RX ADMIN — CEFEPIME HYDROCHLORIDE 1 G: 1 INJECTION, POWDER, FOR SOLUTION INTRAMUSCULAR; INTRAVENOUS at 19:55

## 2019-09-23 RX ADMIN — TROSPIUM CHLORIDE 20 MG: 20 TABLET, FILM COATED ORAL at 21:50

## 2019-09-23 RX ADMIN — SODIUM CHLORIDE: 9 INJECTION, SOLUTION INTRAVENOUS at 17:44

## 2019-09-23 RX ADMIN — LINAGLIPTIN 5 MG: 5 TABLET, FILM COATED ORAL at 21:50

## 2019-09-23 ASSESSMENT — PAIN DESCRIPTION - PAIN TYPE: TYPE: ACUTE PAIN

## 2019-09-23 ASSESSMENT — PAIN SCALES - GENERAL
PAINLEVEL_OUTOF10: 3
PAINLEVEL_OUTOF10: 0
PAINLEVEL_OUTOF10: 2
PAINLEVEL_OUTOF10: 8
PAINLEVEL_OUTOF10: 8

## 2019-09-23 ASSESSMENT — PAIN DESCRIPTION - LOCATION: LOCATION: ABDOMEN

## 2019-09-23 ASSESSMENT — PAIN DESCRIPTION - DESCRIPTORS: DESCRIPTORS: ACHING

## 2019-09-23 NOTE — ED PROVIDER NOTES
1450]   BP Temp Temp Source Pulse Resp SpO2 Height Weight   (!) 109/57 97.4 °F (36.3 °C) Oral 84 20 94 % 5' 7\" (1.702 m) 210 lb (95.3 kg)       Physical Exam  · Constitutional/General: Alert and pleasant, chronically ill-appearing without superimposed acute illness, non toxic. · HEENT:  NC/NT. PERRLA,  Airway patent. Oromucosa is pink, dry, intact without lesions. Sclera without icterus. · Neck: Supple, full ROM, non tender to palpation in the midline, no stridor, no crepitus, no jugular venous distention   · Respiratory: Lungs clear to auscultation bilaterally, no wheezes, rales, or rhonchi. Air entry is mildly diminished throughout  · CV:  Regular rate. Regular rhythm. No murmurs, gallops, or rubs. 2+ distal pulses  · Chest: No chest wall tenderness  · GI: Left lower quadrant is palpated without reproduction of tenderness, and without rebound, guarding or rigidity. The remainder of the abdomen and flanks are nontender to palpation and percussion. Abdomen Soft, Non distended. +BS. No rebound, guarding, or rigidity. No pulsatile masses. · Musculoskeletal: Moves all extremities x 4. Warm and well perfused, no clubbing, cyanosis, or edema. Capillary refill <3 seconds  · Integument: skin warm and dry. No rashes.    · Lymphatic: no lymphadenopathy noted  · Neurologic: No focal deficits  · Psychiatric: Normal Affect      Lab / Imaging Results   (All laboratory and radiology results have been personally reviewed by myself)  Labs:  Results for orders placed or performed during the hospital encounter of 09/23/19   CBC Auto Differential   Result Value Ref Range    WBC 14.2 (H) 4.5 - 11.5 E9/L    RBC 4.91 3.80 - 5.80 E12/L    Hemoglobin 14.7 12.5 - 16.5 g/dL    Hematocrit 44.8 37.0 - 54.0 %    MCV 91.2 80.0 - 99.9 fL    MCH 29.9 26.0 - 35.0 pg    MCHC 32.8 32.0 - 34.5 %    RDW 14.6 11.5 - 15.0 fL    Platelets 889 (L) 541 - 450 E9/L    MPV 10.1 7.0 - 12.0 fL    Neutrophils % 81.6 (H) 43.0 - 80.0 %    Immature

## 2019-09-24 ENCOUNTER — ANESTHESIA EVENT (OUTPATIENT)
Dept: OPERATING ROOM | Age: 84
DRG: 659 | End: 2019-09-24
Payer: MEDICARE

## 2019-09-24 ENCOUNTER — ANESTHESIA (OUTPATIENT)
Dept: OPERATING ROOM | Age: 84
DRG: 659 | End: 2019-09-24
Payer: MEDICARE

## 2019-09-24 ENCOUNTER — APPOINTMENT (OUTPATIENT)
Dept: GENERAL RADIOLOGY | Age: 84
DRG: 659 | End: 2019-09-24
Payer: MEDICARE

## 2019-09-24 VITALS — DIASTOLIC BLOOD PRESSURE: 57 MMHG | SYSTOLIC BLOOD PRESSURE: 89 MMHG | OXYGEN SATURATION: 93 %

## 2019-09-24 PROBLEM — Z79.01 CHRONIC ANTICOAGULATION: Status: ACTIVE | Noted: 2019-09-24

## 2019-09-24 PROBLEM — N18.30 CKD (CHRONIC KIDNEY DISEASE) STAGE 3, GFR 30-59 ML/MIN (HCC): Status: ACTIVE | Noted: 2019-09-24

## 2019-09-24 LAB
ANION GAP SERPL CALCULATED.3IONS-SCNC: 12 MMOL/L (ref 7–16)
BASOPHILS ABSOLUTE: 0.03 E9/L (ref 0–0.2)
BASOPHILS RELATIVE PERCENT: 0.2 % (ref 0–2)
BUN BLDV-MCNC: 44 MG/DL (ref 8–23)
CALCIUM SERPL-MCNC: 8.3 MG/DL (ref 8.6–10.2)
CHLORIDE BLD-SCNC: 103 MMOL/L (ref 98–107)
CO2: 22 MMOL/L (ref 22–29)
CREAT SERPL-MCNC: 3.6 MG/DL (ref 0.7–1.2)
EKG ATRIAL RATE: 77 BPM
EKG P AXIS: 40 DEGREES
EKG P-R INTERVAL: 202 MS
EKG Q-T INTERVAL: 406 MS
EKG QRS DURATION: 118 MS
EKG QTC CALCULATION (BAZETT): 459 MS
EKG R AXIS: 88 DEGREES
EKG T AXIS: 54 DEGREES
EKG VENTRICULAR RATE: 77 BPM
EOSINOPHILS ABSOLUTE: 0.03 E9/L (ref 0.05–0.5)
EOSINOPHILS RELATIVE PERCENT: 0.2 % (ref 0–6)
GFR AFRICAN AMERICAN: 20
GFR NON-AFRICAN AMERICAN: 16 ML/MIN/1.73
GLUCOSE BLD-MCNC: 185 MG/DL (ref 74–99)
HCT VFR BLD CALC: 39.1 % (ref 37–54)
HEMOGLOBIN: 12.9 G/DL (ref 12.5–16.5)
IMMATURE GRANULOCYTES #: 0.09 E9/L
IMMATURE GRANULOCYTES %: 0.7 % (ref 0–5)
INR BLD: 2.9
LYMPHOCYTES ABSOLUTE: 1.11 E9/L (ref 1.5–4)
LYMPHOCYTES RELATIVE PERCENT: 8.6 % (ref 20–42)
MCH RBC QN AUTO: 30.1 PG (ref 26–35)
MCHC RBC AUTO-ENTMCNC: 33 % (ref 32–34.5)
MCV RBC AUTO: 91.1 FL (ref 80–99.9)
METER GLUCOSE: 113 MG/DL (ref 74–99)
METER GLUCOSE: 129 MG/DL (ref 74–99)
METER GLUCOSE: 156 MG/DL (ref 74–99)
METER GLUCOSE: 168 MG/DL (ref 74–99)
MONOCYTES ABSOLUTE: 1.1 E9/L (ref 0.1–0.95)
MONOCYTES RELATIVE PERCENT: 8.5 % (ref 2–12)
NEUTROPHILS ABSOLUTE: 10.52 E9/L (ref 1.8–7.3)
NEUTROPHILS RELATIVE PERCENT: 81.8 % (ref 43–80)
PDW BLD-RTO: 14.6 FL (ref 11.5–15)
PLATELET # BLD: 121 E9/L (ref 130–450)
PMV BLD AUTO: 10.5 FL (ref 7–12)
POTASSIUM REFLEX MAGNESIUM: 4.1 MMOL/L (ref 3.5–5)
PROTHROMBIN TIME: 35.2 SEC (ref 9.3–12.4)
RBC # BLD: 4.29 E12/L (ref 3.8–5.8)
SODIUM BLD-SCNC: 137 MMOL/L (ref 132–146)
WBC # BLD: 12.9 E9/L (ref 4.5–11.5)

## 2019-09-24 PROCEDURE — 87088 URINE BACTERIA CULTURE: CPT

## 2019-09-24 PROCEDURE — 6370000000 HC RX 637 (ALT 250 FOR IP): Performed by: UROLOGY

## 2019-09-24 PROCEDURE — 6360000002 HC RX W HCPCS: Performed by: INTERNAL MEDICINE

## 2019-09-24 PROCEDURE — 2580000003 HC RX 258: Performed by: UROLOGY

## 2019-09-24 PROCEDURE — 3600000013 HC SURGERY LEVEL 3 ADDTL 15MIN: Performed by: UROLOGY

## 2019-09-24 PROCEDURE — 74420 UROGRAPHY RTRGR +-KUB: CPT

## 2019-09-24 PROCEDURE — 6360000002 HC RX W HCPCS: Performed by: NURSE ANESTHETIST, CERTIFIED REGISTERED

## 2019-09-24 PROCEDURE — 94761 N-INVAS EAR/PLS OXIMETRY MLT: CPT

## 2019-09-24 PROCEDURE — 7100000001 HC PACU RECOVERY - ADDTL 15 MIN: Performed by: UROLOGY

## 2019-09-24 PROCEDURE — C1769 GUIDE WIRE: HCPCS | Performed by: UROLOGY

## 2019-09-24 PROCEDURE — 82962 GLUCOSE BLOOD TEST: CPT

## 2019-09-24 PROCEDURE — 36415 COLL VENOUS BLD VENIPUNCTURE: CPT

## 2019-09-24 PROCEDURE — C2617 STENT, NON-COR, TEM W/O DEL: HCPCS | Performed by: UROLOGY

## 2019-09-24 PROCEDURE — 6360000002 HC RX W HCPCS: Performed by: UROLOGY

## 2019-09-24 PROCEDURE — 2580000003 HC RX 258: Performed by: NURSE ANESTHETIST, CERTIFIED REGISTERED

## 2019-09-24 PROCEDURE — C1758 CATHETER, URETERAL: HCPCS | Performed by: UROLOGY

## 2019-09-24 PROCEDURE — 1200000000 HC SEMI PRIVATE

## 2019-09-24 PROCEDURE — 93010 ELECTROCARDIOGRAM REPORT: CPT | Performed by: INTERNAL MEDICINE

## 2019-09-24 PROCEDURE — 3600000003 HC SURGERY LEVEL 3 BASE: Performed by: UROLOGY

## 2019-09-24 PROCEDURE — 6360000002 HC RX W HCPCS: Performed by: PHYSICIAN ASSISTANT

## 2019-09-24 PROCEDURE — 85025 COMPLETE CBC W/AUTO DIFF WBC: CPT

## 2019-09-24 PROCEDURE — 7100000000 HC PACU RECOVERY - FIRST 15 MIN: Performed by: UROLOGY

## 2019-09-24 PROCEDURE — 3700000001 HC ADD 15 MINUTES (ANESTHESIA): Performed by: UROLOGY

## 2019-09-24 PROCEDURE — 80048 BASIC METABOLIC PNL TOTAL CA: CPT

## 2019-09-24 PROCEDURE — 2700000000 HC OXYGEN THERAPY PER DAY

## 2019-09-24 PROCEDURE — 85610 PROTHROMBIN TIME: CPT

## 2019-09-24 PROCEDURE — 6370000000 HC RX 637 (ALT 250 FOR IP): Performed by: PHYSICIAN ASSISTANT

## 2019-09-24 PROCEDURE — 3700000000 HC ANESTHESIA ATTENDED CARE: Performed by: UROLOGY

## 2019-09-24 PROCEDURE — 2580000003 HC RX 258: Performed by: PHYSICIAN ASSISTANT

## 2019-09-24 PROCEDURE — 94640 AIRWAY INHALATION TREATMENT: CPT

## 2019-09-24 PROCEDURE — 6370000000 HC RX 637 (ALT 250 FOR IP): Performed by: INTERNAL MEDICINE

## 2019-09-24 PROCEDURE — 2709999900 HC NON-CHARGEABLE SUPPLY: Performed by: UROLOGY

## 2019-09-24 DEVICE — URETERAL STENT
Type: IMPLANTABLE DEVICE | Site: URETER | Status: FUNCTIONAL
Brand: PERCUFLEX™

## 2019-09-24 RX ORDER — FENTANYL CITRATE 50 UG/ML
INJECTION, SOLUTION INTRAMUSCULAR; INTRAVENOUS PRN
Status: DISCONTINUED | OUTPATIENT
Start: 2019-09-24 | End: 2019-09-24 | Stop reason: SDUPTHER

## 2019-09-24 RX ORDER — SODIUM CHLORIDE, SODIUM LACTATE, POTASSIUM CHLORIDE, CALCIUM CHLORIDE 600; 310; 30; 20 MG/100ML; MG/100ML; MG/100ML; MG/100ML
INJECTION, SOLUTION INTRAVENOUS CONTINUOUS
Status: DISCONTINUED | OUTPATIENT
Start: 2019-09-24 | End: 2019-09-25

## 2019-09-24 RX ORDER — PROPOFOL 10 MG/ML
INJECTION, EMULSION INTRAVENOUS PRN
Status: DISCONTINUED | OUTPATIENT
Start: 2019-09-24 | End: 2019-09-24 | Stop reason: SDUPTHER

## 2019-09-24 RX ORDER — IPRATROPIUM BROMIDE AND ALBUTEROL SULFATE 2.5; .5 MG/3ML; MG/3ML
1 SOLUTION RESPIRATORY (INHALATION) 4 TIMES DAILY
Status: DISCONTINUED | OUTPATIENT
Start: 2019-09-24 | End: 2019-09-27 | Stop reason: HOSPADM

## 2019-09-24 RX ORDER — FORMOTEROL FUMARATE 20 UG/2ML
20 SOLUTION RESPIRATORY (INHALATION) EVERY 12 HOURS
Status: DISCONTINUED | OUTPATIENT
Start: 2019-09-24 | End: 2019-09-27 | Stop reason: HOSPADM

## 2019-09-24 RX ORDER — SODIUM CHLORIDE 9 MG/ML
INJECTION, SOLUTION INTRAVENOUS CONTINUOUS PRN
Status: DISCONTINUED | OUTPATIENT
Start: 2019-09-24 | End: 2019-09-24 | Stop reason: SDUPTHER

## 2019-09-24 RX ORDER — OXYCODONE HYDROCHLORIDE AND ACETAMINOPHEN 5; 325 MG/1; MG/1
1 TABLET ORAL EVERY 4 HOURS PRN
Status: DISCONTINUED | OUTPATIENT
Start: 2019-09-24 | End: 2019-09-27 | Stop reason: HOSPADM

## 2019-09-24 RX ORDER — FENTANYL CITRATE 50 UG/ML
25 INJECTION, SOLUTION INTRAMUSCULAR; INTRAVENOUS EVERY 5 MIN PRN
Status: DISCONTINUED | OUTPATIENT
Start: 2019-09-24 | End: 2019-09-24 | Stop reason: HOSPADM

## 2019-09-24 RX ADMIN — TROSPIUM CHLORIDE 20 MG: 20 TABLET, FILM COATED ORAL at 13:53

## 2019-09-24 RX ADMIN — BUDESONIDE 500 MCG: 0.5 SUSPENSION RESPIRATORY (INHALATION) at 07:52

## 2019-09-24 RX ADMIN — ALBUTEROL SULFATE 2.5 MG: 2.5 SOLUTION RESPIRATORY (INHALATION) at 07:52

## 2019-09-24 RX ADMIN — Medication 10 ML: at 13:54

## 2019-09-24 RX ADMIN — IPRATROPIUM BROMIDE AND ALBUTEROL SULFATE 1 AMPULE: 2.5; .5 SOLUTION RESPIRATORY (INHALATION) at 15:32

## 2019-09-24 RX ADMIN — SODIUM CHLORIDE: 9 INJECTION, SOLUTION INTRAVENOUS at 11:42

## 2019-09-24 RX ADMIN — DAPSONE 50 MG: 25 TABLET ORAL at 13:53

## 2019-09-24 RX ADMIN — IPRATROPIUM BROMIDE AND ALBUTEROL SULFATE 1 AMPULE: 2.5; .5 SOLUTION RESPIRATORY (INHALATION) at 19:42

## 2019-09-24 RX ADMIN — INSULIN LISPRO 1 UNITS: 100 INJECTION, SOLUTION INTRAVENOUS; SUBCUTANEOUS at 17:22

## 2019-09-24 RX ADMIN — CEFEPIME HYDROCHLORIDE 1 G: 1 INJECTION, POWDER, FOR SOLUTION INTRAMUSCULAR; INTRAVENOUS at 23:41

## 2019-09-24 RX ADMIN — FORMOTEROL FUMARATE DIHYDRATE 20 MCG: 20 SOLUTION RESPIRATORY (INHALATION) at 19:43

## 2019-09-24 RX ADMIN — PROPOFOL 220 MG: 10 INJECTION, EMULSION INTRAVENOUS at 11:52

## 2019-09-24 RX ADMIN — BUDESONIDE 500 MCG: 0.5 SUSPENSION RESPIRATORY (INHALATION) at 19:42

## 2019-09-24 RX ADMIN — SODIUM CHLORIDE, POTASSIUM CHLORIDE, SODIUM LACTATE AND CALCIUM CHLORIDE: 600; 310; 30; 20 INJECTION, SOLUTION INTRAVENOUS at 06:41

## 2019-09-24 RX ADMIN — SIMVASTATIN 40 MG: 40 TABLET, FILM COATED ORAL at 20:08

## 2019-09-24 RX ADMIN — INSULIN LISPRO 1 UNITS: 100 INJECTION, SOLUTION INTRAVENOUS; SUBCUTANEOUS at 20:09

## 2019-09-24 RX ADMIN — SODIUM CHLORIDE, POTASSIUM CHLORIDE, SODIUM LACTATE AND CALCIUM CHLORIDE: 600; 310; 30; 20 INJECTION, SOLUTION INTRAVENOUS at 18:47

## 2019-09-24 RX ADMIN — HYDROCODONE BITARTRATE AND ACETAMINOPHEN 1 TABLET: 7.5; 325 TABLET ORAL at 05:19

## 2019-09-24 RX ADMIN — CEFEPIME HYDROCHLORIDE 1 G: 1 INJECTION, POWDER, FOR SOLUTION INTRAMUSCULAR; INTRAVENOUS at 11:42

## 2019-09-24 RX ADMIN — TAMSULOSIN HYDROCHLORIDE 0.4 MG: 0.4 CAPSULE ORAL at 13:53

## 2019-09-24 RX ADMIN — SODIUM CHLORIDE, POTASSIUM CHLORIDE, SODIUM LACTATE AND CALCIUM CHLORIDE: 600; 310; 30; 20 INJECTION, SOLUTION INTRAVENOUS at 13:54

## 2019-09-24 RX ADMIN — INSULIN GLARGINE 40 UNITS: 100 INJECTION, SOLUTION SUBCUTANEOUS at 20:09

## 2019-09-24 RX ADMIN — LOSARTAN POTASSIUM 25 MG: 25 TABLET ORAL at 13:53

## 2019-09-24 RX ADMIN — LINAGLIPTIN 5 MG: 5 TABLET, FILM COATED ORAL at 13:53

## 2019-09-24 RX ADMIN — FENTANYL CITRATE 25 MCG: 50 INJECTION, SOLUTION INTRAMUSCULAR; INTRAVENOUS at 11:59

## 2019-09-24 ASSESSMENT — PULMONARY FUNCTION TESTS
PIF_VALUE: 1
PIF_VALUE: 0
PIF_VALUE: 0
PIF_VALUE: 1
PIF_VALUE: 0
PIF_VALUE: 1
PIF_VALUE: 0
PIF_VALUE: 0
PIF_VALUE: 3
PIF_VALUE: 0
PIF_VALUE: 1
PIF_VALUE: 0
PIF_VALUE: 1
PIF_VALUE: 0
PIF_VALUE: 1
PIF_VALUE: 0
PIF_VALUE: 1
PIF_VALUE: 1
PIF_VALUE: 0
PIF_VALUE: 0
PIF_VALUE: 1
PIF_VALUE: 0
PIF_VALUE: 0
PIF_VALUE: 1
PIF_VALUE: 0
PIF_VALUE: 1
PIF_VALUE: 0
PIF_VALUE: 0
PIF_VALUE: 1
PIF_VALUE: 0

## 2019-09-24 ASSESSMENT — PAIN SCALES - GENERAL
PAINLEVEL_OUTOF10: 0
PAINLEVEL_OUTOF10: 5
PAINLEVEL_OUTOF10: 7

## 2019-09-24 NOTE — BRIEF OP NOTE
Brief Postoperative Note    Sabrina Mancilla  YOB: 1935  [id number]    Pre-operative Diagnosis: obst left ureter with azotemia    Post-operative Diagnosis: Same    Procedure: cysto retro stent left    Anesthesia: MAC    Surgeons/Assistants: Camden López      Estimated Blood Loss: less than 50     Complications: None    Specimens: Was Not Obtained    Findings:     Electronically signed by Yogi More MD on 9/24/2019 at 12:20 PM

## 2019-09-24 NOTE — OP NOTE
[address] [address]                                OPERATIVE REPORT    PATIENT NAME: Krewin Jones                      :        1935  MED REC NO:   [id number]                            ROOM:  ACCOUNT NO:   [de-identified]                           ADMIT DATE: 2019  PROVIDER:     Regino Bronson MD    DATE OF PROCEDURE:  2019    PREOPERATIVE DIAGNOSIS:  Obstruction of left ureter. POSTOPERATIVE DIAGNOSIS:  Obstruction of left ureter. OPERATION PERFORMED:  Cystopanendoscopy, stent placement, retrograde  pyelogram.    SURGEON:  Regino Bronson MD.    ANESTHESIA:  LMAC. EBL: less than 50. OPERATION REPORT:  With the patient in the lithotomy position under  satisfactory sedation, the genitalia were prepped and draped in a  sterile manner. A 21-Norwegian panendoscope passed into the bladder. Inspection revealed the trigone to be symmetrical.  The bladder was  trabeculated with cellule and early diverticular formation in most  areas. There was prostatic enlargement causing some obstruction. An  open-ended catheter was passed into the left ureteral orifice. Contrast  was instilled. It was a hydroureter. I was able to pass a wire up to  the kidney, following which a 26 cm 6-Norwegian JJ stent was passed. The  stent curled in the renal pelvis and in the bladder there was a large  amount of dark bloody urine released upon passing of the stent. A  retrograde on the right side was unremarkable. A Reeves catheter was  inserted. The patient was sent to the recovery room in satisfactory  condition.         Jairo Aguilar MD    D: 2019 12:27:41       T: 2019 12:33:07     [email or web address]  Job#: [id number]     Doc#: [id number]    CC:

## 2019-09-24 NOTE — PROGRESS NOTES
Dr. Clem Funk notified of consult, he will see in the morning.    Electronically signed by Gaby Looney RN on 9/24/2019 at 5:57 PM

## 2019-09-24 NOTE — H&P
[address] [facility]  History and Physical      CHIEF COMPLAINT:  LLQ pain    HISTORY OF PRESENT ILLNESS:      The patient is a 81 y.o. male patient of Dr Evelin Little who presents with L LQ pain. Worsening for days. Pt w decrease PO and L side pain. Worsening for days. Associated fevers or chills. Denies burning with urination. No bloody urine. No nausea vomiting diarrhea. No exacerbation relief. Past Medical History:    Past Medical History:   Diagnosis Date    [facility]     Diabetes mellitus ([facility]     Hyperlipidemia     Hypertension        Past Surgical History:    History reviewed. No pertinent surgical history.     Medications Prior to Admission:    Medications Prior to Admission: allopurinol (ZYLOPRIM) 100 MG tablet, Take 100 mg by mouth daily  glimepiride (AMARYL) 4 MG tablet, Take 4 mg by mouth every morning (before breakfast)  simvastatin (ZOCOR) 40 MG tablet, Take 40 mg by mouth nightly  budesonide (PULMICORT) 0.5 MG/2ML nebulizer suspension, Take 2 mLs by nebulization 2 times daily DX COPD  warfarin (COUMADIN) 2 MG tablet, Take one tablet oral daily  albuterol (PROVENTIL) (2.5 MG/3ML) 0.083% nebulizer solution, Take 3 mLs by nebulization 4 times daily  aspirin 81 MG tablet, Take 81 mg by mouth daily  dapsone 100 MG tablet, Take 50 mg by mouth daily  tolterodine (DETROL LA) 4 MG extended release capsule, Take 4 mg by mouth daily  finasteride (PROSCAR) 5 MG tablet, Take 5 mg by mouth daily  tamsulosin (FLOMAX) 0.4 MG capsule, Take 0.4 mg by mouth daily  lansoprazole (PREVACID) 30 MG delayed release capsule, Take 30 mg by mouth daily  insulin glargine (LANTUS) 100 UNIT/ML injection vial, Inject 40 Units into the skin nightly  losartan (COZAAR) 25 MG tablet, Take 25 mg by mouth daily  guaiFENesin (MUCINEX) 600 MG extended release tablet, Take 1,200 mg by mouth 2 times daily  HYDROcodone-acetaminophen (NORCO) 7.5-325 MG per tablet, Take 1 tablet by mouth every 6 hours as needed for Pain. .  cetirizine (ZYRTEC) 10 MG tablet, Take 10 mg by mouth daily  SITagliptin (JANUVIA) 100 MG tablet, Take 100 mg by mouth daily    Allergies:    Penicillin g; Penicillins; and Tetanus toxoids    Social History:    reports that he has quit smoking. He has never used smokeless tobacco. He reports that he does not drink alcohol or use drugs. Family History:   pt denies contributory history     REVIEW OF SYSTEMS:  As above in the HPI, otherwise negative    PHYSICAL EXAM:    Vitals:  /67   Pulse 80   Temp 98.3 °F (36.8 °C) (Oral)   Resp 20   Ht 5' 7\" (1.702 m)   Wt 218 lb (98.9 kg)   SpO2 91%   BMI 34.14 kg/m²     General:  Awake, alert, oriented X 3. Well developed, well nourished, well groomed. No apparent distress. HEENT:  Normocephalic, atraumatic. Pupils equal, round, reactive to light. No scleral icterus. No conjunctival injection. Normal lips, teeth, and gums. No nasal discharge. Neck:  Supple  Heart:  RRR, no murmurs, gallops, rubs  Lungs:  CTA bilaterally, bilat symmetrical expansion, no wheeze, rales, or rhonchi  Abdomen:   Bowel sounds present, soft, nontender, no masses, no organomegaly, no peritoneal signs  Extremities:  No clubbing, cyanosis, or edema  Skin:  Warm and dry, no open lesions or rash  Neuro:  Cranial nerves 2-12 intact, no focal deficits  Breast: deferred  Rectal: deferred  Genitalia:  deferred    LABS:    CBC with Differential:    Lab Results   Component Value Date    WBC 12.9 09/24/2019    RBC 4.29 09/24/2019    HGB 12.9 09/24/2019    HCT 39.1 09/24/2019     09/24/2019    MCV 91.1 09/24/2019    MCH 30.1 09/24/2019    MCHC 33.0 09/24/2019    RDW 14.6 09/24/2019    LYMPHOPCT 8.6 09/24/2019    MONOPCT 8.5 09/24/2019    BASOPCT 0.2 09/24/2019    MONOSABS 1.10 09/24/2019    LYMPHSABS 1.11 09/24/2019    EOSABS 0.03 09/24/2019    KERRI 0.03 09/24/2019     CMP:    Lab Results   Component Value Date     09/24/2019    K 4.1 09/24/2019     09/24/2019    CO2 22 09/24/2019    BUN 44 09/24/2019    CREATININE 3.6 09/24/2019    GFRAA 20 09/24/2019    LABGLOM 16 09/24/2019    GLUCOSE 185 09/24/2019    PROT 8.0 09/23/2019    LABALBU 4.1 09/23/2019    CALCIUM 8.3 09/24/2019    BILITOT 1.0 09/23/2019    ALKPHOS 77 09/23/2019    AST 13 09/23/2019    ALT 12 09/23/2019     Magnesium:    Lab Results   Component Value Date    MG 1.7 09/01/2018     Phosphorus:  No results found for: PHOS  PT/INR:    Lab Results   Component Value Date    PROTIME 35.2 09/24/2019    INR 2.9 09/24/2019     Last 3 Troponin:    Lab Results   Component Value Date    TROPONINI 0.02 09/23/2019    TROPONINI <0.01 08/31/2018     U/A:    Lab Results   Component Value Date    COLORU Yellow 09/23/2019    PROTEINU 100 09/23/2019    PHUR 5.5 09/23/2019    LABCAST FEW 09/23/2019    WBCUA 1-3 09/23/2019    RBCUA 2-5 09/23/2019    BACTERIA MODERATE 09/23/2019    CLARITYU Clear 09/23/2019    SPECGRAV >=1.030 09/23/2019    LEUKOCYTESUR Negative 09/23/2019    UROBILINOGEN 0.2 09/23/2019    BILIRUBINUR Negative 09/23/2019    BLOODU MODERATE 09/23/2019    GLUCOSEU 100 09/23/2019     ABG:    Lab Results   Component Value Date    PH 7.424 09/01/2018    PCO2 21.6 09/01/2018    PO2 61.9 09/01/2018    HCO3 13.8 09/01/2018    BE -8.0 09/01/2018    O2SAT 92.6 09/01/2018     HgBA1c:    Lab Results   Component Value Date    LABA1C 9.4 09/01/2018     FLP:  No results found for: TRIG, HDL, LDLCALC, LDLDIRECT, LABVLDL  TSH:    Lab Results   Component Value Date    TSH 1.640 08/31/2018       CT ABDOMEN PELVIS WO CONTRAST Additional Contrast? None   Final Result   Moderate hydronephrosis and edema in the left kidney due to an   obstructing 3 mm stone in the distal left ureter. Additional   nonobstructing left kidney stone is present. Uncomplicated diverticulosis of the colon.                 ASSESSMENT:      Principal Problem:    Ureterolithiasis  Active Problems:    COPD (chronic obstructive

## 2019-09-24 NOTE — ANESTHESIA PRE PROCEDURE
Past Medical History:        Diagnosis Date    COPD (chronic obstructive pulmonary disease) ([facility]     Diabetes mellitus ([facility]     Hyperlipidemia     Hypertension        Past Surgical History:  History reviewed. No pertinent surgical history. Social History:    Social History     Tobacco Use    Smoking status: Former Smoker    Smokeless tobacco: Never Used   Substance Use Topics    Alcohol use: No                                Counseling given: Not Answered      Vital Signs (Current):   Vitals:    09/23/19 2122 09/24/19 0228 09/24/19 0752 09/24/19 0900   BP:    (!) 101/58   Pulse:    84   Resp:    18   Temp:    98.8 °F (37.1 °C)   TempSrc:    Oral   SpO2: 92%  91% 91%   Weight:  218 lb (98.9 kg)     Height:                                                  BP Readings from Last 3 Encounters:   09/24/19 (!) 101/58   09/04/18 114/70       NPO Status:                                                                                 BMI:   Wt Readings from Last 3 Encounters:   09/24/19 218 lb (98.9 kg)   09/03/18 199 lb 7 oz (90.5 kg)     Body mass index is 34.14 kg/m². CBC:   Lab Results   Component Value Date    WBC 12.9 09/24/2019    RBC 4.29 09/24/2019    HGB 12.9 09/24/2019    HCT 39.1 09/24/2019    MCV 91.1 09/24/2019    RDW 14.6 09/24/2019     09/24/2019       CMP:   Lab Results   Component Value Date     09/24/2019    K 4.1 09/24/2019     09/24/2019    CO2 22 09/24/2019    BUN 44 09/24/2019    CREATININE 3.6 09/24/2019    GFRAA 20 09/24/2019    LABGLOM 16 09/24/2019    GLUCOSE 185 09/24/2019    PROT 8.0 09/23/2019    CALCIUM 8.3 09/24/2019    BILITOT 1.0 09/23/2019    ALKPHOS 77 09/23/2019    AST 13 09/23/2019    ALT 12 09/23/2019       POC Tests: No results for input(s): POCGLU, POCNA, POCK, POCCL, POCBUN, POCHEMO, POCHCT in the last 72 hours.     Coags:   Lab Results   Component Value Date    PROTIME 35.2 09/24/2019    INR 2.9 09/24/2019    APTT 33.8 09/23/2019       HCG (If

## 2019-09-25 PROBLEM — J96.21 ACUTE AND CHRONIC RESPIRATORY FAILURE WITH HYPOXIA (HCC): Status: ACTIVE | Noted: 2018-08-31

## 2019-09-25 LAB
ANION GAP SERPL CALCULATED.3IONS-SCNC: 11 MMOL/L (ref 7–16)
BASOPHILS ABSOLUTE: 0.04 E9/L (ref 0–0.2)
BASOPHILS RELATIVE PERCENT: 0.4 % (ref 0–2)
BUN BLDV-MCNC: 45 MG/DL (ref 8–23)
CALCIUM SERPL-MCNC: 7.8 MG/DL (ref 8.6–10.2)
CHLORIDE BLD-SCNC: 104 MMOL/L (ref 98–107)
CO2: 22 MMOL/L (ref 22–29)
CREAT SERPL-MCNC: 2.6 MG/DL (ref 0.7–1.2)
EOSINOPHILS ABSOLUTE: 0.34 E9/L (ref 0.05–0.5)
EOSINOPHILS RELATIVE PERCENT: 3.7 % (ref 0–6)
GFR AFRICAN AMERICAN: 29
GFR NON-AFRICAN AMERICAN: 24 ML/MIN/1.73
GLUCOSE BLD-MCNC: 134 MG/DL (ref 74–99)
HCT VFR BLD CALC: 37.9 % (ref 37–54)
HEMOGLOBIN: 12.2 G/DL (ref 12.5–16.5)
IMMATURE GRANULOCYTES #: 0.07 E9/L
IMMATURE GRANULOCYTES %: 0.8 % (ref 0–5)
INR BLD: 2.5
LYMPHOCYTES ABSOLUTE: 1.29 E9/L (ref 1.5–4)
LYMPHOCYTES RELATIVE PERCENT: 14.2 % (ref 20–42)
MCH RBC QN AUTO: 29.5 PG (ref 26–35)
MCHC RBC AUTO-ENTMCNC: 32.2 % (ref 32–34.5)
MCV RBC AUTO: 91.5 FL (ref 80–99.9)
METER GLUCOSE: 105 MG/DL (ref 74–99)
METER GLUCOSE: 158 MG/DL (ref 74–99)
METER GLUCOSE: 176 MG/DL (ref 74–99)
METER GLUCOSE: 192 MG/DL (ref 74–99)
MONOCYTES ABSOLUTE: 0.74 E9/L (ref 0.1–0.95)
MONOCYTES RELATIVE PERCENT: 8.1 % (ref 2–12)
NEUTROPHILS ABSOLUTE: 6.62 E9/L (ref 1.8–7.3)
NEUTROPHILS RELATIVE PERCENT: 72.8 % (ref 43–80)
PDW BLD-RTO: 14.6 FL (ref 11.5–15)
PLATELET # BLD: 132 E9/L (ref 130–450)
PMV BLD AUTO: 10.6 FL (ref 7–12)
POTASSIUM REFLEX MAGNESIUM: 3.9 MMOL/L (ref 3.5–5)
PROTHROMBIN TIME: 30.4 SEC (ref 9.3–12.4)
RBC # BLD: 4.14 E12/L (ref 3.8–5.8)
SODIUM BLD-SCNC: 137 MMOL/L (ref 132–146)
WBC # BLD: 9.1 E9/L (ref 4.5–11.5)

## 2019-09-25 PROCEDURE — 82962 GLUCOSE BLOOD TEST: CPT

## 2019-09-25 PROCEDURE — 6370000000 HC RX 637 (ALT 250 FOR IP): Performed by: UROLOGY

## 2019-09-25 PROCEDURE — 36415 COLL VENOUS BLD VENIPUNCTURE: CPT

## 2019-09-25 PROCEDURE — 90653 IIV ADJUVANT VACCINE IM: CPT | Performed by: INTERNAL MEDICINE

## 2019-09-25 PROCEDURE — 85025 COMPLETE CBC W/AUTO DIFF WBC: CPT

## 2019-09-25 PROCEDURE — 2580000003 HC RX 258: Performed by: UROLOGY

## 2019-09-25 PROCEDURE — 94761 N-INVAS EAR/PLS OXIMETRY MLT: CPT

## 2019-09-25 PROCEDURE — 94640 AIRWAY INHALATION TREATMENT: CPT

## 2019-09-25 PROCEDURE — 6370000000 HC RX 637 (ALT 250 FOR IP): Performed by: PHYSICIAN ASSISTANT

## 2019-09-25 PROCEDURE — 2700000000 HC OXYGEN THERAPY PER DAY

## 2019-09-25 PROCEDURE — 6360000002 HC RX W HCPCS: Performed by: UROLOGY

## 2019-09-25 PROCEDURE — G0008 ADMIN INFLUENZA VIRUS VAC: HCPCS | Performed by: INTERNAL MEDICINE

## 2019-09-25 PROCEDURE — 6370000000 HC RX 637 (ALT 250 FOR IP): Performed by: INTERNAL MEDICINE

## 2019-09-25 PROCEDURE — 2580000003 HC RX 258: Performed by: PHYSICIAN ASSISTANT

## 2019-09-25 PROCEDURE — 97161 PT EVAL LOW COMPLEX 20 MIN: CPT

## 2019-09-25 PROCEDURE — 1200000000 HC SEMI PRIVATE

## 2019-09-25 PROCEDURE — 85610 PROTHROMBIN TIME: CPT

## 2019-09-25 PROCEDURE — 6360000002 HC RX W HCPCS: Performed by: PHYSICIAN ASSISTANT

## 2019-09-25 PROCEDURE — 6360000002 HC RX W HCPCS: Performed by: INTERNAL MEDICINE

## 2019-09-25 PROCEDURE — 80048 BASIC METABOLIC PNL TOTAL CA: CPT

## 2019-09-25 PROCEDURE — 97165 OT EVAL LOW COMPLEX 30 MIN: CPT

## 2019-09-25 RX ORDER — SODIUM CHLORIDE, SODIUM LACTATE, POTASSIUM CHLORIDE, CALCIUM CHLORIDE 600; 310; 30; 20 MG/100ML; MG/100ML; MG/100ML; MG/100ML
INJECTION, SOLUTION INTRAVENOUS CONTINUOUS
Status: DISCONTINUED | OUTPATIENT
Start: 2019-09-25 | End: 2019-09-27 | Stop reason: HOSPADM

## 2019-09-25 RX ADMIN — LINAGLIPTIN 5 MG: 5 TABLET, FILM COATED ORAL at 10:08

## 2019-09-25 RX ADMIN — INSULIN LISPRO 1 UNITS: 100 INJECTION, SOLUTION INTRAVENOUS; SUBCUTANEOUS at 16:33

## 2019-09-25 RX ADMIN — INSULIN GLARGINE 40 UNITS: 100 INJECTION, SOLUTION SUBCUTANEOUS at 21:00

## 2019-09-25 RX ADMIN — FORMOTEROL FUMARATE DIHYDRATE 20 MCG: 20 SOLUTION RESPIRATORY (INHALATION) at 08:18

## 2019-09-25 RX ADMIN — ASPIRIN 81 MG 81 MG: 81 TABLET ORAL at 10:08

## 2019-09-25 RX ADMIN — GLIMEPIRIDE 4 MG: 4 TABLET ORAL at 06:16

## 2019-09-25 RX ADMIN — IPRATROPIUM BROMIDE AND ALBUTEROL SULFATE 1 AMPULE: 2.5; .5 SOLUTION RESPIRATORY (INHALATION) at 15:57

## 2019-09-25 RX ADMIN — IPRATROPIUM BROMIDE AND ALBUTEROL SULFATE 1 AMPULE: 2.5; .5 SOLUTION RESPIRATORY (INHALATION) at 20:01

## 2019-09-25 RX ADMIN — BUDESONIDE 500 MCG: 0.5 SUSPENSION RESPIRATORY (INHALATION) at 20:01

## 2019-09-25 RX ADMIN — IPRATROPIUM BROMIDE AND ALBUTEROL SULFATE 1 AMPULE: 2.5; .5 SOLUTION RESPIRATORY (INHALATION) at 08:18

## 2019-09-25 RX ADMIN — DAPSONE 50 MG: 25 TABLET ORAL at 10:08

## 2019-09-25 RX ADMIN — SIMVASTATIN 40 MG: 40 TABLET, FILM COATED ORAL at 21:00

## 2019-09-25 RX ADMIN — IPRATROPIUM BROMIDE AND ALBUTEROL SULFATE 1 AMPULE: 2.5; .5 SOLUTION RESPIRATORY (INHALATION) at 12:58

## 2019-09-25 RX ADMIN — CEFEPIME HYDROCHLORIDE 1 G: 1 INJECTION, POWDER, FOR SOLUTION INTRAMUSCULAR; INTRAVENOUS at 13:40

## 2019-09-25 RX ADMIN — INSULIN LISPRO 1 UNITS: 100 INJECTION, SOLUTION INTRAVENOUS; SUBCUTANEOUS at 21:00

## 2019-09-25 RX ADMIN — FORMOTEROL FUMARATE DIHYDRATE 20 MCG: 20 SOLUTION RESPIRATORY (INHALATION) at 20:01

## 2019-09-25 RX ADMIN — BUDESONIDE 500 MCG: 0.5 SUSPENSION RESPIRATORY (INHALATION) at 08:18

## 2019-09-25 RX ADMIN — INSULIN LISPRO 1 UNITS: 100 INJECTION, SOLUTION INTRAVENOUS; SUBCUTANEOUS at 11:50

## 2019-09-25 RX ADMIN — SODIUM CHLORIDE, POTASSIUM CHLORIDE, SODIUM LACTATE AND CALCIUM CHLORIDE: 600; 310; 30; 20 INJECTION, SOLUTION INTRAVENOUS at 06:16

## 2019-09-25 RX ADMIN — Medication 10 ML: at 09:00

## 2019-09-25 RX ADMIN — TAMSULOSIN HYDROCHLORIDE 0.4 MG: 0.4 CAPSULE ORAL at 10:08

## 2019-09-25 RX ADMIN — TROSPIUM CHLORIDE 20 MG: 20 TABLET, FILM COATED ORAL at 10:08

## 2019-09-25 RX ADMIN — INFLUENZA VACCINE, ADJUVANTED 0.5 ML: 15; 15; 15 INJECTION, SUSPENSION INTRAMUSCULAR at 11:56

## 2019-09-25 ASSESSMENT — PAIN SCALES - GENERAL: PAINLEVEL_OUTOF10: 0

## 2019-09-25 NOTE — CARE COORDINATION
Social Work discharge planning   Sw consult order from 9/23/19 noted stating \"discharge planning\". SW spoke to Gisell Lantigua with [facility] in [address] [place] [address], who advised that pt has had home 02 with them since 9/4/2018 for 8L NC continuous. He is presently on 11L o2. Pt lives with his wife in 3 story condo. Pt/wife confirmed pt has 02, nebulizer and std cane . Pt said \"I don't use need the cane, but I use it as a crutch\". Pt's PT AMPAC today is 16/24, which could possibly qualify him for snf. Sw met with pt, wife and oldest dtr. Discussed AMPAC and SNF vs hhc. Pt and wife NOT agreeable to SNF, home only. They are receptive to hhc, but had no preference \"as long as in network\". SW encouraged them to ask for SW if discharge planning needs arise. Social Work to follow for support and discharge planning with CM.   Electronically signed by Jhony Awan on 9/25/2019 at 3:38 PM

## 2019-09-25 NOTE — PROGRESS NOTES
Physical Therapy    Facility/Department: [facility] MED SURG  Initial Assessment    NAME: Kristen Collazo  : 1935  MRN: [id number]    Date of Service: 2019       REQUIRES PT FOLLOW UP: Yes       Patient Diagnosis(es): The primary encounter diagnosis was Acute renal failure superimposed on stage 3 chronic kidney disease, unspecified acute renal failure type ([facility] Diagnoses of Ureteral stone with hydronephrosis, Acute cystitis with hematuria, Anticoagulated on warfarin, and Pain were also pertinent to this visit. has a past medical history of COPD (chronic obstructive pulmonary disease) ([facility] Diabetes mellitus ([facility] Hyperlipidemia, and Hypertension. has a past surgical history that includes Lithotripsy (N/A, 2019). Evaluating Therapist: Domonique Albert PT        Room #: 148   DIAGNOSIS:  uretrolithiasis s/p cysto retro , stent placement   Additional Pertinent History: dementia  PRECAUTIONS: falls, O2@ 11 L via high flow cannula     Social:  Pt lives with wife  in a 1 floor plan   Prior to admission pt walked with  SPC . Has O2 . Info per pt      Initial Evaluation  Date: 19 Treatment      Short Term/ Long Term   Goals   Was pt agreeable to Eval/treatment? yes      Does pt have pain? Mild lower abd pain      Bed Mobility  Rolling: min assist   Supine to sit: min assist   Sit to supine:  NT   Scooting:  Min assist   SBA   Transfers Sit to stand:  Min assist   Stand to sit: min assist   Stand pivot: min assist   SBA    Ambulation     4 steps bed to chair, no AD  with min assist    50 -100 feet with AAD  with  SBA       Stair negotiation: ascended and descended NT    2  steps with  1 rail with  CGA    LE ROM  WFL      LE strength  4/ 5      AM- PAC RAW score               Pt is alert and Oriented x  2  ( grossly )     Balance:  Min assist, fall risk   Endurance: poor   Chair alarm: Yes      ASSESSMENT  Pt displays functional ability as noted in the objective portion of this evaluation.

## 2019-09-26 LAB
ANION GAP SERPL CALCULATED.3IONS-SCNC: 11 MMOL/L (ref 7–16)
BASOPHILS ABSOLUTE: 0.06 E9/L (ref 0–0.2)
BASOPHILS RELATIVE PERCENT: 0.8 % (ref 0–2)
BUN BLDV-MCNC: 39 MG/DL (ref 8–23)
CALCIUM SERPL-MCNC: 8.3 MG/DL (ref 8.6–10.2)
CHLORIDE BLD-SCNC: 104 MMOL/L (ref 98–107)
CO2: 21 MMOL/L (ref 22–29)
CREAT SERPL-MCNC: 1.9 MG/DL (ref 0.7–1.2)
EOSINOPHILS ABSOLUTE: 0.39 E9/L (ref 0.05–0.5)
EOSINOPHILS RELATIVE PERCENT: 5.2 % (ref 0–6)
GFR AFRICAN AMERICAN: 41
GFR NON-AFRICAN AMERICAN: 34 ML/MIN/1.73
GLUCOSE BLD-MCNC: 285 MG/DL (ref 74–99)
HBA1C MFR BLD: 10.1 % (ref 4–5.6)
HCT VFR BLD CALC: 37.1 % (ref 37–54)
HEMOGLOBIN: 12.4 G/DL (ref 12.5–16.5)
IMMATURE GRANULOCYTES #: 0.05 E9/L
IMMATURE GRANULOCYTES %: 0.7 % (ref 0–5)
INR BLD: 1.6
LYMPHOCYTES ABSOLUTE: 1 E9/L (ref 1.5–4)
LYMPHOCYTES RELATIVE PERCENT: 13.5 % (ref 20–42)
MCH RBC QN AUTO: 30.5 PG (ref 26–35)
MCHC RBC AUTO-ENTMCNC: 33.4 % (ref 32–34.5)
MCV RBC AUTO: 91.4 FL (ref 80–99.9)
METER GLUCOSE: 157 MG/DL (ref 74–99)
METER GLUCOSE: 161 MG/DL (ref 74–99)
METER GLUCOSE: 175 MG/DL (ref 74–99)
METER GLUCOSE: 218 MG/DL (ref 74–99)
MONOCYTES ABSOLUTE: 0.54 E9/L (ref 0.1–0.95)
MONOCYTES RELATIVE PERCENT: 7.3 % (ref 2–12)
NEUTROPHILS ABSOLUTE: 5.39 E9/L (ref 1.8–7.3)
NEUTROPHILS RELATIVE PERCENT: 72.5 % (ref 43–80)
PDW BLD-RTO: 14.5 FL (ref 11.5–15)
PLATELET # BLD: 148 E9/L (ref 130–450)
PMV BLD AUTO: 10.3 FL (ref 7–12)
POTASSIUM REFLEX MAGNESIUM: 4.4 MMOL/L (ref 3.5–5)
PROTHROMBIN TIME: 19.7 SEC (ref 9.3–12.4)
RBC # BLD: 4.06 E12/L (ref 3.8–5.8)
SODIUM BLD-SCNC: 136 MMOL/L (ref 132–146)
URINE CULTURE, ROUTINE: NORMAL
WBC # BLD: 7.4 E9/L (ref 4.5–11.5)

## 2019-09-26 PROCEDURE — 6360000002 HC RX W HCPCS: Performed by: INTERNAL MEDICINE

## 2019-09-26 PROCEDURE — 94640 AIRWAY INHALATION TREATMENT: CPT

## 2019-09-26 PROCEDURE — 2580000003 HC RX 258: Performed by: PHYSICIAN ASSISTANT

## 2019-09-26 PROCEDURE — 6360000002 HC RX W HCPCS: Performed by: PHYSICIAN ASSISTANT

## 2019-09-26 PROCEDURE — 36415 COLL VENOUS BLD VENIPUNCTURE: CPT

## 2019-09-26 PROCEDURE — 6360000002 HC RX W HCPCS: Performed by: UROLOGY

## 2019-09-26 PROCEDURE — 83036 HEMOGLOBIN GLYCOSYLATED A1C: CPT

## 2019-09-26 PROCEDURE — 1200000000 HC SEMI PRIVATE

## 2019-09-26 PROCEDURE — 97530 THERAPEUTIC ACTIVITIES: CPT

## 2019-09-26 PROCEDURE — 2700000000 HC OXYGEN THERAPY PER DAY

## 2019-09-26 PROCEDURE — 82962 GLUCOSE BLOOD TEST: CPT

## 2019-09-26 PROCEDURE — 85610 PROTHROMBIN TIME: CPT

## 2019-09-26 PROCEDURE — 80048 BASIC METABOLIC PNL TOTAL CA: CPT

## 2019-09-26 PROCEDURE — 6370000000 HC RX 637 (ALT 250 FOR IP): Performed by: UROLOGY

## 2019-09-26 PROCEDURE — 97535 SELF CARE MNGMENT TRAINING: CPT

## 2019-09-26 PROCEDURE — 6370000000 HC RX 637 (ALT 250 FOR IP): Performed by: PHYSICIAN ASSISTANT

## 2019-09-26 PROCEDURE — 6370000000 HC RX 637 (ALT 250 FOR IP): Performed by: INTERNAL MEDICINE

## 2019-09-26 PROCEDURE — 2580000003 HC RX 258: Performed by: UROLOGY

## 2019-09-26 PROCEDURE — 85025 COMPLETE CBC W/AUTO DIFF WBC: CPT

## 2019-09-26 RX ADMIN — IPRATROPIUM BROMIDE AND ALBUTEROL SULFATE 1 AMPULE: 2.5; .5 SOLUTION RESPIRATORY (INHALATION) at 19:41

## 2019-09-26 RX ADMIN — SODIUM CHLORIDE, POTASSIUM CHLORIDE, SODIUM LACTATE AND CALCIUM CHLORIDE: 600; 310; 30; 20 INJECTION, SOLUTION INTRAVENOUS at 14:19

## 2019-09-26 RX ADMIN — ASPIRIN 81 MG 81 MG: 81 TABLET ORAL at 09:01

## 2019-09-26 RX ADMIN — HYDROCODONE BITARTRATE AND ACETAMINOPHEN 1 TABLET: 7.5; 325 TABLET ORAL at 20:24

## 2019-09-26 RX ADMIN — TAMSULOSIN HYDROCHLORIDE 0.4 MG: 0.4 CAPSULE ORAL at 09:02

## 2019-09-26 RX ADMIN — GLIMEPIRIDE 4 MG: 4 TABLET ORAL at 09:02

## 2019-09-26 RX ADMIN — SIMVASTATIN 40 MG: 40 TABLET, FILM COATED ORAL at 20:24

## 2019-09-26 RX ADMIN — IPRATROPIUM BROMIDE AND ALBUTEROL SULFATE 1 AMPULE: 2.5; .5 SOLUTION RESPIRATORY (INHALATION) at 07:45

## 2019-09-26 RX ADMIN — BUDESONIDE 500 MCG: 0.5 SUSPENSION RESPIRATORY (INHALATION) at 19:41

## 2019-09-26 RX ADMIN — BUDESONIDE 500 MCG: 0.5 SUSPENSION RESPIRATORY (INHALATION) at 07:46

## 2019-09-26 RX ADMIN — INSULIN LISPRO 1 UNITS: 100 INJECTION, SOLUTION INTRAVENOUS; SUBCUTANEOUS at 06:03

## 2019-09-26 RX ADMIN — LINAGLIPTIN 5 MG: 5 TABLET, FILM COATED ORAL at 09:01

## 2019-09-26 RX ADMIN — IPRATROPIUM BROMIDE AND ALBUTEROL SULFATE 1 AMPULE: 2.5; .5 SOLUTION RESPIRATORY (INHALATION) at 15:40

## 2019-09-26 RX ADMIN — HYDROCODONE BITARTRATE AND ACETAMINOPHEN 1 TABLET: 7.5; 325 TABLET ORAL at 02:01

## 2019-09-26 RX ADMIN — INSULIN LISPRO 1 UNITS: 100 INJECTION, SOLUTION INTRAVENOUS; SUBCUTANEOUS at 11:35

## 2019-09-26 RX ADMIN — FORMOTEROL FUMARATE DIHYDRATE 20 MCG: 20 SOLUTION RESPIRATORY (INHALATION) at 07:46

## 2019-09-26 RX ADMIN — CEFEPIME HYDROCHLORIDE 1 G: 1 INJECTION, POWDER, FOR SOLUTION INTRAMUSCULAR; INTRAVENOUS at 14:17

## 2019-09-26 RX ADMIN — INSULIN LISPRO 1 UNITS: 100 INJECTION, SOLUTION INTRAVENOUS; SUBCUTANEOUS at 16:57

## 2019-09-26 RX ADMIN — SODIUM CHLORIDE, POTASSIUM CHLORIDE, SODIUM LACTATE AND CALCIUM CHLORIDE: 600; 310; 30; 20 INJECTION, SOLUTION INTRAVENOUS at 06:58

## 2019-09-26 RX ADMIN — LOSARTAN POTASSIUM 25 MG: 25 TABLET ORAL at 09:02

## 2019-09-26 RX ADMIN — Medication 10 ML: at 20:24

## 2019-09-26 RX ADMIN — IPRATROPIUM BROMIDE AND ALBUTEROL SULFATE 1 AMPULE: 2.5; .5 SOLUTION RESPIRATORY (INHALATION) at 11:23

## 2019-09-26 RX ADMIN — INSULIN LISPRO 1 UNITS: 100 INJECTION, SOLUTION INTRAVENOUS; SUBCUTANEOUS at 20:25

## 2019-09-26 RX ADMIN — FORMOTEROL FUMARATE DIHYDRATE 20 MCG: 20 SOLUTION RESPIRATORY (INHALATION) at 19:41

## 2019-09-26 RX ADMIN — INSULIN GLARGINE 40 UNITS: 100 INJECTION, SOLUTION SUBCUTANEOUS at 20:24

## 2019-09-26 RX ADMIN — CEFEPIME HYDROCHLORIDE 1 G: 1 INJECTION, POWDER, FOR SOLUTION INTRAMUSCULAR; INTRAVENOUS at 02:21

## 2019-09-26 RX ADMIN — TROSPIUM CHLORIDE 20 MG: 20 TABLET, FILM COATED ORAL at 09:02

## 2019-09-26 RX ADMIN — DAPSONE 50 MG: 25 TABLET ORAL at 09:01

## 2019-09-26 ASSESSMENT — PAIN SCALES - GENERAL
PAINLEVEL_OUTOF10: 10
PAINLEVEL_OUTOF10: 0
PAINLEVEL_OUTOF10: 10
PAINLEVEL_OUTOF10: 0

## 2019-09-26 ASSESSMENT — PAIN - FUNCTIONAL ASSESSMENT
PAIN_FUNCTIONAL_ASSESSMENT: PREVENTS OR INTERFERES SOME ACTIVE ACTIVITIES AND ADLS
PAIN_FUNCTIONAL_ASSESSMENT: PREVENTS OR INTERFERES SOME ACTIVE ACTIVITIES AND ADLS

## 2019-09-26 ASSESSMENT — PAIN DESCRIPTION - ONSET: ONSET: SUDDEN

## 2019-09-26 ASSESSMENT — PAIN DESCRIPTION - DESCRIPTORS
DESCRIPTORS: ACHING;DISCOMFORT
DESCRIPTORS: ACHING;DISCOMFORT

## 2019-09-26 ASSESSMENT — PAIN DESCRIPTION - ORIENTATION
ORIENTATION: LEFT;LOWER
ORIENTATION: LEFT;LOWER

## 2019-09-26 ASSESSMENT — PAIN DESCRIPTION - LOCATION
LOCATION: ABDOMEN;FLANK
LOCATION: ABDOMEN;FLANK

## 2019-09-26 ASSESSMENT — PAIN DESCRIPTION - PAIN TYPE
TYPE: ACUTE PAIN
TYPE: ACUTE PAIN

## 2019-09-26 ASSESSMENT — PAIN DESCRIPTION - FREQUENCY: FREQUENCY: INTERMITTENT

## 2019-09-26 NOTE — PROGRESS NOTES
([facility]    COPD (chronic obstructive pulmonary disease) (HCC)    Diabetes mellitus type 2, uncontrolled ([facility])    History of DVT (deep vein thrombosis)    Obesity (BMI 30-39. 9)    Chronic respiratory failure with hypoxia ([facility])    Alzheimer's dementia with behavioral disturbance    DEMI (acute kidney injury) ([facility]    CKD (chronic kidney disease) stage 3, GFR 30-59 ml/min ([facility])    Chronic anticoagulation  Resolved Problems:    * No resolved hospital problems.  *      Plan:  Admit  High flow O2, Nebs, Pulm Consult appreciated   8L NC baseline, currently 11L NC  Discussed w Dr Angie Saldivar  IV abx  UCx NGTD  BCx NGTD  IVF  - Cr 1.9 close to baseline 1.4-1.7  Urology Consult appreciated    Monitor labs closely  Medications for other co morbidities cont as appropriate w dosage adjustments as necessary   PT/OT  DVT PPx  DC planning [facility] w [facility]    Electronically signed by Kanchan Rizvi MD on 9/26/2019 at 11:30 AM

## 2019-09-26 NOTE — PROGRESS NOTES
Pulmonary Progress Note    Admit Date: 2019  Hospital day                               PCP: Bishop Genesis MD    Chief Complaint (s):  Patient Active Problem List   Diagnosis    Acute and chronic respiratory failure with hypoxia ([facility]    COPD (chronic obstructive pulmonary disease) ([facility]    Diabetes mellitus type 2, uncontrolled ([facility]    History of DVT (deep vein thrombosis)    Obesity (BMI 30-39. 9)    Chronic respiratory failure with hypoxia ([facility]    Alzheimer's dementia with behavioral disturbance    Ureterolithiasis    DEMI (acute kidney injury) ([facility]    CKD (chronic kidney disease) stage 3, GFR 30-59 ml/min (HCC)    Chronic anticoagulation       Subjective:  · Awake and alert, sitting up in a chair, appears to be breathing quite easily. Labs reviewed including a markedly elevated hemoglobin A1c.       Vitals:  VITALS:  /71   Pulse 77   Temp 98.3 °F (36.8 °C) (Oral)   Resp 16   Ht 5' 7\" (1.702 m)   Wt 221 lb 3.2 oz (100.3 kg)   SpO2 93%   BMI 34.64 kg/m²     24HR INTAKE/OUTPUT:      Intake/Output Summary (Last 24 hours) at 2019 1226  Last data filed at 2019 0930  Gross per 24 hour   Intake 3373 ml   Output 2850 ml   Net 523 ml       24HR PULSE OXIMETRY RANGE:    SpO2  Av.5 %  Min: 91 %  Max: 96 %    Medications:  IV:   lactated ringers 125 mL/hr at 19 0658    dextrose         Scheduled Meds:   cefepime  1 g Intravenous Q12H    formoterol  20 mcg Nebulization Q12H    ipratropium-albuterol  1 ampule Inhalation 4x daily    tamsulosin  0.4 mg Oral Daily    aspirin  81 mg Oral Daily    budesonide  500 mcg Nebulization BID    dapsone  50 mg Oral Daily    glimepiride  4 mg Oral QAM AC    insulin glargine  40 Units Subcutaneous Nightly    losartan  25 mg Oral Daily    simvastatin  40 mg Oral Nightly    linagliptin  5 mg Oral Daily    trospium  20 mg Oral Daily    insulin lispro  0-6 Units Subcutaneous TID WC    insulin lispro  0-3 Units

## 2019-09-26 NOTE — PROGRESS NOTES
O2 restin-90%, with activity: 80%. Mod recovery time in sitting with pursed lip breathing to 89% Limited  Patient's O2 staturations varied between 86% and 91% (while on 11L of high-flow O2 via nasal cannula) during BSC use and functional transfers/mobility; nursing notified. Cues given to facilitate proper breathing techniques. standing ever x5-6 min with fair plus balance during self care tasks     Comments: Upon this OTR's arrival to patient's room, patient seated in bedside chair. Upon conclusion of this session, patient seated in bedside chair with all lines and tubes intact, chair alarm activated, and call light within reach. Education: Patient education provided regardin) safe transfer techniques, 2) proper breathing techniques, 3) importance of having assistance with ADLs and functional transfers/mobility, 4) call light use. Patient demonstrated limited understanding. · Patient has made progress towards set goals. · Continue with current plan of care. Time In: 1238  Time Out: 1301  Total Treatment Time: 23 minutes    Sarah Chavez OTR/L  License Number: [id number]

## 2019-09-27 VITALS
HEIGHT: 67 IN | DIASTOLIC BLOOD PRESSURE: 80 MMHG | RESPIRATION RATE: 18 BRPM | BODY MASS INDEX: 35.22 KG/M2 | WEIGHT: 224.4 LBS | OXYGEN SATURATION: 92 % | SYSTOLIC BLOOD PRESSURE: 138 MMHG | HEART RATE: 80 BPM | TEMPERATURE: 97.5 F

## 2019-09-27 LAB
ANION GAP SERPL CALCULATED.3IONS-SCNC: 13 MMOL/L (ref 7–16)
BASOPHILS ABSOLUTE: 0.06 E9/L (ref 0–0.2)
BASOPHILS RELATIVE PERCENT: 0.8 % (ref 0–2)
BUN BLDV-MCNC: 31 MG/DL (ref 8–23)
CALCIUM SERPL-MCNC: 8.6 MG/DL (ref 8.6–10.2)
CHLORIDE BLD-SCNC: 103 MMOL/L (ref 98–107)
CO2: 24 MMOL/L (ref 22–29)
CREAT SERPL-MCNC: 1.6 MG/DL (ref 0.7–1.2)
EOSINOPHILS ABSOLUTE: 0.43 E9/L (ref 0.05–0.5)
EOSINOPHILS RELATIVE PERCENT: 5.6 % (ref 0–6)
GFR AFRICAN AMERICAN: 50
GFR NON-AFRICAN AMERICAN: 41 ML/MIN/1.73
GLUCOSE BLD-MCNC: 116 MG/DL (ref 74–99)
HCT VFR BLD CALC: 39.3 % (ref 37–54)
HEMOGLOBIN: 12.7 G/DL (ref 12.5–16.5)
IMMATURE GRANULOCYTES #: 0.07 E9/L
IMMATURE GRANULOCYTES %: 0.9 % (ref 0–5)
INR BLD: 1.4
LYMPHOCYTES ABSOLUTE: 1.22 E9/L (ref 1.5–4)
LYMPHOCYTES RELATIVE PERCENT: 15.9 % (ref 20–42)
MCH RBC QN AUTO: 30 PG (ref 26–35)
MCHC RBC AUTO-ENTMCNC: 32.3 % (ref 32–34.5)
MCV RBC AUTO: 92.7 FL (ref 80–99.9)
METER GLUCOSE: 111 MG/DL (ref 74–99)
METER GLUCOSE: 203 MG/DL (ref 74–99)
MONOCYTES ABSOLUTE: 0.67 E9/L (ref 0.1–0.95)
MONOCYTES RELATIVE PERCENT: 8.7 % (ref 2–12)
NEUTROPHILS ABSOLUTE: 5.21 E9/L (ref 1.8–7.3)
NEUTROPHILS RELATIVE PERCENT: 68.1 % (ref 43–80)
PDW BLD-RTO: 14.4 FL (ref 11.5–15)
PLATELET # BLD: 183 E9/L (ref 130–450)
PMV BLD AUTO: 10.6 FL (ref 7–12)
POTASSIUM REFLEX MAGNESIUM: 4.3 MMOL/L (ref 3.5–5)
PROTHROMBIN TIME: 15.6 SEC (ref 9.3–12.4)
RBC # BLD: 4.24 E12/L (ref 3.8–5.8)
SODIUM BLD-SCNC: 140 MMOL/L (ref 132–146)
WBC # BLD: 7.7 E9/L (ref 4.5–11.5)

## 2019-09-27 PROCEDURE — 85025 COMPLETE CBC W/AUTO DIFF WBC: CPT

## 2019-09-27 PROCEDURE — 6370000000 HC RX 637 (ALT 250 FOR IP): Performed by: UROLOGY

## 2019-09-27 PROCEDURE — 36415 COLL VENOUS BLD VENIPUNCTURE: CPT

## 2019-09-27 PROCEDURE — 2580000003 HC RX 258: Performed by: UROLOGY

## 2019-09-27 PROCEDURE — 6370000000 HC RX 637 (ALT 250 FOR IP): Performed by: PHYSICIAN ASSISTANT

## 2019-09-27 PROCEDURE — 2700000000 HC OXYGEN THERAPY PER DAY

## 2019-09-27 PROCEDURE — 85610 PROTHROMBIN TIME: CPT

## 2019-09-27 PROCEDURE — 97530 THERAPEUTIC ACTIVITIES: CPT

## 2019-09-27 PROCEDURE — 94640 AIRWAY INHALATION TREATMENT: CPT

## 2019-09-27 PROCEDURE — 82962 GLUCOSE BLOOD TEST: CPT

## 2019-09-27 PROCEDURE — 6370000000 HC RX 637 (ALT 250 FOR IP): Performed by: INTERNAL MEDICINE

## 2019-09-27 PROCEDURE — 94761 N-INVAS EAR/PLS OXIMETRY MLT: CPT

## 2019-09-27 PROCEDURE — 6360000002 HC RX W HCPCS: Performed by: PHYSICIAN ASSISTANT

## 2019-09-27 PROCEDURE — 6360000002 HC RX W HCPCS: Performed by: INTERNAL MEDICINE

## 2019-09-27 PROCEDURE — 80048 BASIC METABOLIC PNL TOTAL CA: CPT

## 2019-09-27 PROCEDURE — 6360000002 HC RX W HCPCS: Performed by: UROLOGY

## 2019-09-27 RX ORDER — POLYETHYLENE GLYCOL 3350 17 G/17G
17 POWDER, FOR SOLUTION ORAL DAILY PRN
Qty: 527 G | Refills: 1 | Status: SHIPPED | OUTPATIENT
Start: 2019-09-27 | End: 2019-10-27

## 2019-09-27 RX ADMIN — GLIMEPIRIDE 4 MG: 4 TABLET ORAL at 06:29

## 2019-09-27 RX ADMIN — FORMOTEROL FUMARATE DIHYDRATE 20 MCG: 20 SOLUTION RESPIRATORY (INHALATION) at 07:51

## 2019-09-27 RX ADMIN — DAPSONE 50 MG: 25 TABLET ORAL at 09:46

## 2019-09-27 RX ADMIN — INSULIN LISPRO 2 UNITS: 100 INJECTION, SOLUTION INTRAVENOUS; SUBCUTANEOUS at 11:46

## 2019-09-27 RX ADMIN — SODIUM CHLORIDE, POTASSIUM CHLORIDE, SODIUM LACTATE AND CALCIUM CHLORIDE: 600; 310; 30; 20 INJECTION, SOLUTION INTRAVENOUS at 08:34

## 2019-09-27 RX ADMIN — TROSPIUM CHLORIDE 20 MG: 20 TABLET, FILM COATED ORAL at 09:46

## 2019-09-27 RX ADMIN — BUDESONIDE 500 MCG: 0.5 SUSPENSION RESPIRATORY (INHALATION) at 07:51

## 2019-09-27 RX ADMIN — IPRATROPIUM BROMIDE AND ALBUTEROL SULFATE 1 AMPULE: 2.5; .5 SOLUTION RESPIRATORY (INHALATION) at 12:01

## 2019-09-27 RX ADMIN — LINAGLIPTIN 5 MG: 5 TABLET, FILM COATED ORAL at 09:46

## 2019-09-27 RX ADMIN — ASPIRIN 81 MG 81 MG: 81 TABLET ORAL at 09:47

## 2019-09-27 RX ADMIN — LOSARTAN POTASSIUM 25 MG: 25 TABLET ORAL at 09:46

## 2019-09-27 RX ADMIN — TAMSULOSIN HYDROCHLORIDE 0.4 MG: 0.4 CAPSULE ORAL at 09:46

## 2019-09-27 RX ADMIN — HYDROCODONE BITARTRATE AND ACETAMINOPHEN 1 TABLET: 7.5; 325 TABLET ORAL at 03:58

## 2019-09-27 RX ADMIN — IPRATROPIUM BROMIDE AND ALBUTEROL SULFATE 1 AMPULE: 2.5; .5 SOLUTION RESPIRATORY (INHALATION) at 07:50

## 2019-09-27 RX ADMIN — CEFEPIME HYDROCHLORIDE 1 G: 1 INJECTION, POWDER, FOR SOLUTION INTRAMUSCULAR; INTRAVENOUS at 01:59

## 2019-09-27 ASSESSMENT — PAIN DESCRIPTION - DESCRIPTORS: DESCRIPTORS: ACHING;DISCOMFORT

## 2019-09-27 ASSESSMENT — PAIN SCALES - GENERAL
PAINLEVEL_OUTOF10: 0
PAINLEVEL_OUTOF10: 0
PAINLEVEL_OUTOF10: 10

## 2019-09-27 ASSESSMENT — PAIN DESCRIPTION - LOCATION: LOCATION: ABDOMEN;FLANK

## 2019-09-27 ASSESSMENT — PAIN DESCRIPTION - ONSET: ONSET: ON-GOING

## 2019-09-27 ASSESSMENT — PAIN DESCRIPTION - ORIENTATION: ORIENTATION: LOWER;LEFT

## 2019-09-27 ASSESSMENT — PAIN DESCRIPTION - FREQUENCY: FREQUENCY: INTERMITTENT

## 2019-09-27 ASSESSMENT — PAIN DESCRIPTION - PAIN TYPE: TYPE: ACUTE PAIN

## 2019-09-27 NOTE — PROGRESS NOTES
Subjective:  Feeling better No CP, SOB, F, V, D, P  Agitated overnight. Removing oxygen. Hitting it nurses. Objective:    /73   Pulse 85   Temp 98.9 °F (37.2 °C) (Oral)   Resp 15   Ht 5' 7\" (1.702 m)   Wt 224 lb 6.4 oz (101.8 kg)   SpO2 96%   BMI 35.15 kg/m²     24HR INTAKE/OUTPUT:      Intake/Output Summary (Last 24 hours) at 9/27/2019 0911  Last data filed at 9/27/2019 0850  Gross per 24 hour   Intake 1445 ml   Output 3650 ml   Net -2205 ml     nad fused  Heart:  RRR, no murmurs, gallops, or rubs. Lungs:  CTA bilaterally, no wheeze, rales or rhonchi  Abd: bowel sounds present, nontender, nondistended, no masses  Extrem:  No clubbing, cyanosis, or edema    Most Recent Labs  Lab Results   Component Value Date    WBC 7.7 09/27/2019    HGB 12.7 09/27/2019    HCT 39.3 09/27/2019     09/27/2019     09/27/2019    K 4.3 09/27/2019     09/27/2019    CREATININE 1.6 (H) 09/27/2019    BUN 31 (H) 09/27/2019    CO2 24 09/27/2019    GLUCOSE 116 (H) 09/27/2019    ALT 12 09/23/2019    AST 13 09/23/2019    INR 1.4 09/27/2019    TSH 1.640 08/31/2018    LABA1C 10.1 (H) 09/26/2019     No results for input(s): MG in the last 72 hours. Lab Results   Component Value Date    CALCIUM 8.6 09/27/2019        FL RETROGRADE PYELOGRAM W WO KUB   Final Result   Intraoperative fluoroscopy for retrograde pyelogram and   left ureteral stent placement. The exam has been dictated and signed by Genia Murray PA-C. Elian Lynch MD, Radiologist, have reviewed the images and   report and concur with these findings. CT ABDOMEN PELVIS WO CONTRAST Additional Contrast? None   Final Result   Moderate hydronephrosis and edema in the left kidney due to an   obstructing 3 mm stone in the distal left ureter. Additional   nonobstructing left kidney stone is present. Uncomplicated diverticulosis of the colon.                 Assessment    Principal Problem:    Ureterolithiasis  Active Problems: Acute and chronic respiratory failure with hypoxia (HCC)    COPD (chronic obstructive pulmonary disease) (HCC)    Diabetes mellitus type 2, uncontrolled ([facility])    History of DVT (deep vein thrombosis)    Obesity (BMI 30-39. 9)    Chronic respiratory failure with hypoxia (HCC)    Alzheimer's dementia with behavioral disturbance    DEMI (acute kidney injury) ([facility] [address]    CKD (chronic kidney disease) stage 3, GFR 30-59 ml/min ([facility])    Chronic anticoagulation  Resolved Problems:    * No resolved hospital problems.  *      Plan:  Admit  High flow O2, Nebs, Pulm Consult appreciated   8L NC baseline, currently 8L NC  Discussed w Dr Mecca Parker  IV abx--completed  UCx NGTD  BCx NGTD  Stop IVF  - Cr 1.6 baseline 1.4-1.7  Urology Consult appreciated  Monitor labs closely  Medications for other co morbidities cont as appropriate w dosage adjustments as necessary   PT/OT  DVT PPx  DC planning Home w [address] 79 today    Electronically signed by Grier Nissen, MD on 9/27/2019 at 9:11 AM

## 2019-09-27 NOTE — PROGRESS NOTES
9/27/2019 12:37 PM  Service: Urology  Group: [facility] ([facility])    Carly Means  [phone]    Subjective: The patient is ambulated in the hallway  He still frail generally  Afebrile  His appetite is improved today    Review of Systems  Respiratory: negative  Cardiovascular: negative  Gastrointestinal: negative  Hematologic/lymphatic: negative  Musculoskeletal:negative  Neurological: negative  Endocrine: negative    Scheduled Meds:   formoterol  20 mcg Nebulization Q12H    ipratropium-albuterol  1 ampule Inhalation 4x daily    tamsulosin  0.4 mg Oral Daily    aspirin  81 mg Oral Daily    budesonide  500 mcg Nebulization BID    dapsone  50 mg Oral Daily    glimepiride  4 mg Oral QAM AC    insulin glargine  40 Units Subcutaneous Nightly    losartan  25 mg Oral Daily    simvastatin  40 mg Oral Nightly    linagliptin  5 mg Oral Daily    trospium  20 mg Oral Daily    insulin lispro  0-6 Units Subcutaneous TID WC    insulin lispro  0-3 Units Subcutaneous Nightly    sodium chloride flush  10 mL Intravenous 2 times per day       Objective:  Vitals:    09/27/19 0911   BP:    Pulse: 85   Resp:    Temp:    SpO2:          Allergies: Penicillin g; Penicillins; and Tetanus toxoids    General Appearance: alert and oriented to person, place and time and in no acute distress  Skin: no rash or erythema  Head: normocephalic and atraumatic  Pulmonary/Chest: clear to auscultation bilaterally- no wheezes, rales or rhonchi, normal air movement, no respiratory distress and no chest wall tenderness  Abdomen: soft, non-tender, non-distended, normal bowel sounds, no masses or organomegaly and no inguinal adenopathy  Genitalia: He has some penile edema extremities: no cyanosis, clubbing or edema and Arnie's sign negative bilaterally      Reeves well positioned and draining blood-tinged urine.     Labs:     Recent Labs     09/27/19  0625      K 4.3      CO2 24   BUN 31*   CREATININE 1.6*   GLUCOSE 116*

## 2019-09-28 LAB
BLOOD CULTURE, ROUTINE: NORMAL
CULTURE, BLOOD 2: NORMAL

## 2019-10-23 ENCOUNTER — PREP FOR PROCEDURE (OUTPATIENT)
Dept: UROLOGY | Age: 84
End: 2019-10-23

## 2019-10-23 RX ORDER — SODIUM CHLORIDE 0.9 % (FLUSH) 0.9 %
10 SYRINGE (ML) INJECTION EVERY 12 HOURS SCHEDULED
Status: CANCELLED | OUTPATIENT
Start: 2019-10-23

## 2019-10-23 RX ORDER — SODIUM CHLORIDE 0.9 % (FLUSH) 0.9 %
10 SYRINGE (ML) INJECTION PRN
Status: CANCELLED | OUTPATIENT
Start: 2019-10-23

## 2019-10-23 RX ORDER — SODIUM CHLORIDE, SODIUM LACTATE, POTASSIUM CHLORIDE, CALCIUM CHLORIDE 600; 310; 30; 20 MG/100ML; MG/100ML; MG/100ML; MG/100ML
INJECTION, SOLUTION INTRAVENOUS CONTINUOUS
Status: CANCELLED | OUTPATIENT
Start: 2019-10-23

## 2019-10-23 RX ORDER — CIPROFLOXACIN 2 MG/ML
400 INJECTION, SOLUTION INTRAVENOUS
Status: CANCELLED | OUTPATIENT
Start: 2019-10-23 | End: 2019-10-23

## 2019-11-26 ENCOUNTER — APPOINTMENT (OUTPATIENT)
Dept: GENERAL RADIOLOGY | Age: 84
End: 2019-11-26
Attending: UROLOGY
Payer: MEDICARE

## 2019-11-26 ENCOUNTER — HOSPITAL ENCOUNTER (OUTPATIENT)
Age: 84
Setting detail: OUTPATIENT SURGERY
Discharge: HOME | End: 2019-11-26
Attending: UROLOGY | Admitting: UROLOGY
Payer: MEDICARE

## 2019-11-26 ENCOUNTER — ANESTHESIA (OUTPATIENT)
Dept: OPERATING ROOM | Age: 84
End: 2019-11-26
Payer: MEDICARE

## 2019-11-26 ENCOUNTER — ANESTHESIA EVENT (OUTPATIENT)
Dept: OPERATING ROOM | Age: 84
End: 2019-11-26
Payer: MEDICARE

## 2019-11-26 VITALS
OXYGEN SATURATION: 97 % | DIASTOLIC BLOOD PRESSURE: 68 MMHG | WEIGHT: 210 LBS | BODY MASS INDEX: 33.75 KG/M2 | HEIGHT: 66 IN | RESPIRATION RATE: 25 BRPM | TEMPERATURE: 98.6 F | SYSTOLIC BLOOD PRESSURE: 124 MMHG | HEART RATE: 77 BPM

## 2019-11-26 VITALS
SYSTOLIC BLOOD PRESSURE: 94 MMHG | DIASTOLIC BLOOD PRESSURE: 63 MMHG | OXYGEN SATURATION: 92 % | RESPIRATION RATE: 14 BRPM

## 2019-11-26 DIAGNOSIS — Z01.812 PRE-OPERATIVE LABORATORY EXAMINATION: Primary | ICD-10-CM

## 2019-11-26 LAB
CHP ED QC CHECK: NORMAL
GLUCOSE BLD-MCNC: 134 MG/DL
INR BLD: 1.3
METER GLUCOSE: 134 MG/DL (ref 74–99)
PROTHROMBIN TIME: 15.1 SEC (ref 9.3–12.4)

## 2019-11-26 PROCEDURE — 88300 SURGICAL PATH GROSS: CPT

## 2019-11-26 PROCEDURE — 82365 CALCULUS SPECTROSCOPY: CPT

## 2019-11-26 PROCEDURE — 7100000011 HC PHASE II RECOVERY - ADDTL 15 MIN: Performed by: UROLOGY

## 2019-11-26 PROCEDURE — C2617 STENT, NON-COR, TEM W/O DEL: HCPCS | Performed by: UROLOGY

## 2019-11-26 PROCEDURE — 6360000002 HC RX W HCPCS: Performed by: NURSE PRACTITIONER

## 2019-11-26 PROCEDURE — 2720000010 HC SURG SUPPLY STERILE: Performed by: UROLOGY

## 2019-11-26 PROCEDURE — 3700000000 HC ANESTHESIA ATTENDED CARE: Performed by: UROLOGY

## 2019-11-26 PROCEDURE — 3700000001 HC ADD 15 MINUTES (ANESTHESIA): Performed by: UROLOGY

## 2019-11-26 PROCEDURE — 36415 COLL VENOUS BLD VENIPUNCTURE: CPT

## 2019-11-26 PROCEDURE — 85610 PROTHROMBIN TIME: CPT

## 2019-11-26 PROCEDURE — 3600000014 HC SURGERY LEVEL 4 ADDTL 15MIN: Performed by: UROLOGY

## 2019-11-26 PROCEDURE — 2580000003 HC RX 258: Performed by: NURSE PRACTITIONER

## 2019-11-26 PROCEDURE — 3209999900 FLUORO FOR SURGICAL PROCEDURES

## 2019-11-26 PROCEDURE — 7100000010 HC PHASE II RECOVERY - FIRST 15 MIN: Performed by: UROLOGY

## 2019-11-26 PROCEDURE — 6360000002 HC RX W HCPCS

## 2019-11-26 PROCEDURE — 3600000004 HC SURGERY LEVEL 4 BASE: Performed by: UROLOGY

## 2019-11-26 PROCEDURE — 82962 GLUCOSE BLOOD TEST: CPT

## 2019-11-26 DEVICE — URETERAL STENT
Type: IMPLANTABLE DEVICE | Site: URETER | Status: FUNCTIONAL
Brand: POLARIS™ ULTRA

## 2019-11-26 RX ORDER — SODIUM CHLORIDE 9 MG/ML
INJECTION, SOLUTION INTRAVENOUS CONTINUOUS
Status: CANCELLED | OUTPATIENT
Start: 2019-11-26

## 2019-11-26 RX ORDER — ONDANSETRON 2 MG/ML
4 INJECTION INTRAMUSCULAR; INTRAVENOUS EVERY 6 HOURS PRN
Status: DISCONTINUED | OUTPATIENT
Start: 2019-11-26 | End: 2019-11-26 | Stop reason: HOSPADM

## 2019-11-26 RX ORDER — PHENAZOPYRIDINE HYDROCHLORIDE 100 MG/1
100 TABLET, FILM COATED ORAL 3 TIMES DAILY PRN
Status: DISCONTINUED | OUTPATIENT
Start: 2019-11-26 | End: 2019-11-26 | Stop reason: HOSPADM

## 2019-11-26 RX ORDER — CIPROFLOXACIN 2 MG/ML
400 INJECTION, SOLUTION INTRAVENOUS
Status: COMPLETED | OUTPATIENT
Start: 2019-11-26 | End: 2019-11-26

## 2019-11-26 RX ORDER — IBUPROFEN 400 MG/1
800 TABLET ORAL EVERY 6 HOURS PRN
Status: DISCONTINUED | OUTPATIENT
Start: 2019-11-26 | End: 2019-11-26 | Stop reason: HOSPADM

## 2019-11-26 RX ORDER — IBUPROFEN 800 MG/1
800 TABLET ORAL EVERY 8 HOURS PRN
Qty: 30 TABLET | Refills: 0 | Status: ON HOLD | OUTPATIENT
Start: 2019-11-26 | End: 2021-07-29

## 2019-11-26 RX ORDER — SODIUM CHLORIDE 0.9 % (FLUSH) 0.9 %
10 SYRINGE (ML) INJECTION EVERY 12 HOURS SCHEDULED
Status: DISCONTINUED | OUTPATIENT
Start: 2019-11-26 | End: 2019-11-26 | Stop reason: HOSPADM

## 2019-11-26 RX ORDER — SODIUM CHLORIDE 0.9 % (FLUSH) 0.9 %
10 SYRINGE (ML) INJECTION PRN
Status: CANCELLED | OUTPATIENT
Start: 2019-11-26

## 2019-11-26 RX ORDER — PHENAZOPYRIDINE HYDROCHLORIDE 100 MG/1
100 TABLET, FILM COATED ORAL 3 TIMES DAILY PRN
Qty: 30 TABLET | Refills: 0 | Status: SHIPPED | OUTPATIENT
Start: 2019-11-26 | End: 2019-12-06

## 2019-11-26 RX ORDER — SODIUM CHLORIDE 9 MG/ML
INJECTION, SOLUTION INTRAVENOUS CONTINUOUS
Status: DISCONTINUED | OUTPATIENT
Start: 2019-11-26 | End: 2019-11-26 | Stop reason: HOSPADM

## 2019-11-26 RX ORDER — CIPROFLOXACIN 2 MG/ML
400 INJECTION, SOLUTION INTRAVENOUS
Status: CANCELLED | OUTPATIENT
Start: 2019-11-26 | End: 2019-11-26

## 2019-11-26 RX ORDER — SODIUM CHLORIDE 0.9 % (FLUSH) 0.9 %
10 SYRINGE (ML) INJECTION PRN
Status: DISCONTINUED | OUTPATIENT
Start: 2019-11-26 | End: 2019-11-26 | Stop reason: HOSPADM

## 2019-11-26 RX ORDER — PROPOFOL 10 MG/ML
INJECTION, EMULSION INTRAVENOUS CONTINUOUS PRN
Status: DISCONTINUED | OUTPATIENT
Start: 2019-11-26 | End: 2019-11-26 | Stop reason: SDUPTHER

## 2019-11-26 RX ORDER — SODIUM CHLORIDE 0.9 % (FLUSH) 0.9 %
10 SYRINGE (ML) INJECTION EVERY 12 HOURS SCHEDULED
Status: CANCELLED | OUTPATIENT
Start: 2019-11-26

## 2019-11-26 RX ORDER — FENTANYL CITRATE 50 UG/ML
INJECTION, SOLUTION INTRAMUSCULAR; INTRAVENOUS PRN
Status: DISCONTINUED | OUTPATIENT
Start: 2019-11-26 | End: 2019-11-26 | Stop reason: SDUPTHER

## 2019-11-26 RX ADMIN — CIPROFLOXACIN 400 MG: 2 INJECTION INTRAVENOUS at 16:54

## 2019-11-26 RX ADMIN — FENTANYL CITRATE 25 MCG: 50 INJECTION, SOLUTION INTRAMUSCULAR; INTRAVENOUS at 17:03

## 2019-11-26 RX ADMIN — PROPOFOL 50 MCG/KG/MIN: 10 INJECTION, EMULSION INTRAVENOUS at 16:57

## 2019-11-26 RX ADMIN — SODIUM CHLORIDE: 9 INJECTION, SOLUTION INTRAVENOUS at 11:29

## 2019-11-26 RX ADMIN — FENTANYL CITRATE 25 MCG: 50 INJECTION, SOLUTION INTRAMUSCULAR; INTRAVENOUS at 17:06

## 2019-11-26 RX ADMIN — FENTANYL CITRATE 25 MCG: 50 INJECTION, SOLUTION INTRAMUSCULAR; INTRAVENOUS at 17:11

## 2019-11-26 ASSESSMENT — PULMONARY FUNCTION TESTS
PIF_VALUE: 1

## 2019-11-26 ASSESSMENT — PAIN SCALES - GENERAL
PAINLEVEL_OUTOF10: 0

## 2019-11-26 ASSESSMENT — PAIN - FUNCTIONAL ASSESSMENT: PAIN_FUNCTIONAL_ASSESSMENT: 0-10

## 2019-11-30 LAB
CALCULI COMPOSITION: NORMAL
MASS: 31 MG
STONE DESCRIPTION: NORMAL
STONE NUMBER: 1
STONE SIZE: NORMAL MM

## 2019-12-18 ENCOUNTER — HOSPITAL ENCOUNTER (OUTPATIENT)
Age: 84
Discharge: HOME | End: 2019-12-20
Payer: MEDICARE

## 2019-12-18 PROCEDURE — 88112 CYTOPATH CELL ENHANCE TECH: CPT

## 2021-07-28 ENCOUNTER — HOSPITAL ENCOUNTER (INPATIENT)
Age: 86
LOS: 5 days | Discharge: HOSPICE | DRG: 683 | End: 2021-08-03
Attending: EMERGENCY MEDICINE | Admitting: INTERNAL MEDICINE
Payer: MEDICARE

## 2021-07-28 DIAGNOSIS — R50.9 FEVER, UNSPECIFIED FEVER CAUSE: ICD-10-CM

## 2021-07-28 DIAGNOSIS — M50.90 CERVICAL DISC DISEASE: Primary | ICD-10-CM

## 2021-07-28 DIAGNOSIS — Z51.5 HOSPICE CARE: ICD-10-CM

## 2021-07-28 DIAGNOSIS — G30.1 LATE ONSET ALZHEIMER'S DEMENTIA WITH BEHAVIORAL DISTURBANCE (HCC): Chronic | ICD-10-CM

## 2021-07-28 DIAGNOSIS — R41.0 CONFUSION: ICD-10-CM

## 2021-07-28 DIAGNOSIS — R53.1 GENERAL WEAKNESS: ICD-10-CM

## 2021-07-28 DIAGNOSIS — F02.818 LATE ONSET ALZHEIMER'S DEMENTIA WITH BEHAVIORAL DISTURBANCE (HCC): Chronic | ICD-10-CM

## 2021-07-28 PROCEDURE — 99285 EMERGENCY DEPT VISIT HI MDM: CPT

## 2021-07-28 ASSESSMENT — PAIN SCALES - PAIN ASSESSMENT IN ADVANCED DEMENTIA (PAINAD)
BODYLANGUAGE: 2
BREATHING: 1
CONSOLABILITY: 1
FACIALEXPRESSION: 1
NEGVOCALIZATION: 1
TOTALSCORE: 6

## 2021-07-28 ASSESSMENT — PAIN DESCRIPTION - LOCATION: LOCATION: THROAT;NECK

## 2021-07-28 ASSESSMENT — PAIN DESCRIPTION - ONSET: ONSET: ON-GOING

## 2021-07-28 ASSESSMENT — PAIN SCALES - GENERAL: PAINLEVEL_OUTOF10: 6

## 2021-07-28 ASSESSMENT — PAIN DESCRIPTION - PAIN TYPE: TYPE: ACUTE PAIN

## 2021-07-28 ASSESSMENT — PAIN DESCRIPTION - DESCRIPTORS: DESCRIPTORS: PATIENT UNABLE TO DESCRIBE

## 2021-07-29 ENCOUNTER — APPOINTMENT (OUTPATIENT)
Dept: CT IMAGING | Age: 86
DRG: 683 | End: 2021-07-29
Payer: MEDICARE

## 2021-07-29 ENCOUNTER — APPOINTMENT (OUTPATIENT)
Dept: GENERAL RADIOLOGY | Age: 86
DRG: 683 | End: 2021-07-29
Payer: MEDICARE

## 2021-07-29 PROBLEM — E87.5 HYPERKALEMIA: Status: ACTIVE | Noted: 2021-07-29

## 2021-07-29 PROBLEM — R50.9 FEVER: Status: ACTIVE | Noted: 2021-07-29

## 2021-07-29 LAB
ADENOVIRUS BY PCR: NOT DETECTED
ALBUMIN SERPL-MCNC: 3.7 G/DL (ref 3.5–5.2)
ALP BLD-CCNC: 90 U/L (ref 40–129)
ALT SERPL-CCNC: 12 U/L (ref 0–40)
ANION GAP SERPL CALCULATED.3IONS-SCNC: 11 MMOL/L (ref 7–16)
APTT: 31.4 SEC (ref 24.5–35.1)
AST SERPL-CCNC: 12 U/L (ref 0–39)
BACTERIA: ABNORMAL /HPF
BASOPHILS ABSOLUTE: 0.03 E9/L (ref 0–0.2)
BASOPHILS RELATIVE PERCENT: 0.2 % (ref 0–2)
BETA-HYDROXYBUTYRATE: 1.32 MMOL/L (ref 0.02–0.27)
BILIRUB SERPL-MCNC: 0.9 MG/DL (ref 0–1.2)
BILIRUBIN URINE: ABNORMAL
BLOOD, URINE: NEGATIVE
BORDETELLA PARAPERTUSSIS BY PCR: NOT DETECTED
BORDETELLA PERTUSSIS BY PCR: NOT DETECTED
BUN BLDV-MCNC: 49 MG/DL (ref 6–23)
CALCIUM SERPL-MCNC: 8.5 MG/DL (ref 8.6–10.2)
CHLAMYDOPHILIA PNEUMONIAE BY PCR: NOT DETECTED
CHLORIDE BLD-SCNC: 108 MMOL/L (ref 98–107)
CHP ED QC CHECK: YES
CK MB: 1.6 NG/ML (ref 0–7.7)
CLARITY: CLEAR
CO2: 19 MMOL/L (ref 22–29)
COLOR: YELLOW
CORONAVIRUS 229E BY PCR: NOT DETECTED
CORONAVIRUS HKU1 BY PCR: NOT DETECTED
CORONAVIRUS NL63 BY PCR: NOT DETECTED
CORONAVIRUS OC43 BY PCR: NOT DETECTED
CREAT SERPL-MCNC: 2.4 MG/DL (ref 0.7–1.2)
CREATININE URINE: 127 MG/DL (ref 40–278)
EKG ATRIAL RATE: 93 BPM
EKG P AXIS: 46 DEGREES
EKG P-R INTERVAL: 208 MS
EKG Q-T INTERVAL: 390 MS
EKG QRS DURATION: 122 MS
EKG QTC CALCULATION (BAZETT): 484 MS
EKG R AXIS: 92 DEGREES
EKG T AXIS: 28 DEGREES
EKG VENTRICULAR RATE: 93 BPM
EOSINOPHILS ABSOLUTE: 0.01 E9/L (ref 0.05–0.5)
EOSINOPHILS RELATIVE PERCENT: 0.1 % (ref 0–6)
EPITHELIAL CELLS, UA: ABNORMAL /HPF
GFR AFRICAN AMERICAN: 31
GFR NON-AFRICAN AMERICAN: 26 ML/MIN/1.73
GLUCOSE BLD-MCNC: 207 MG/DL
GLUCOSE BLD-MCNC: 228 MG/DL (ref 74–99)
GLUCOSE URINE: NEGATIVE MG/DL
HCT VFR BLD CALC: 38.4 % (ref 37–54)
HEMOGLOBIN: 12.6 G/DL (ref 12.5–16.5)
HUMAN METAPNEUMOVIRUS BY PCR: NOT DETECTED
HUMAN RHINOVIRUS/ENTEROVIRUS BY PCR: NOT DETECTED
HYALINE CASTS: ABNORMAL /LPF (ref 0–2)
IMMATURE GRANULOCYTES #: 0.06 E9/L
IMMATURE GRANULOCYTES %: 0.5 % (ref 0–5)
INFLUENZA A BY PCR: NOT DETECTED
INFLUENZA B BY PCR: NOT DETECTED
INR BLD: 2.6
KETONES, URINE: ABNORMAL MG/DL
LACTIC ACID, SEPSIS: 1.2 MMOL/L (ref 0.5–1.9)
LEUKOCYTE ESTERASE, URINE: NEGATIVE
LIPASE: 24 U/L (ref 13–60)
LYMPHOCYTES ABSOLUTE: 0.53 E9/L (ref 1.5–4)
LYMPHOCYTES RELATIVE PERCENT: 4.4 % (ref 20–42)
MCH RBC QN AUTO: 30.4 PG (ref 26–35)
MCHC RBC AUTO-ENTMCNC: 32.8 % (ref 32–34.5)
MCV RBC AUTO: 92.5 FL (ref 80–99.9)
METER GLUCOSE: 136 MG/DL (ref 74–99)
METER GLUCOSE: 155 MG/DL (ref 74–99)
METER GLUCOSE: 179 MG/DL (ref 74–99)
METER GLUCOSE: 207 MG/DL (ref 74–99)
MONOCYTES ABSOLUTE: 0.5 E9/L (ref 0.1–0.95)
MONOCYTES RELATIVE PERCENT: 4.1 % (ref 2–12)
MYCOPLASMA PNEUMONIAE BY PCR: NOT DETECTED
NEUTROPHILS ABSOLUTE: 10.94 E9/L (ref 1.8–7.3)
NEUTROPHILS RELATIVE PERCENT: 90.7 % (ref 43–80)
NITRITE, URINE: NEGATIVE
OVALOCYTES: ABNORMAL
PARAINFLUENZA VIRUS 1 BY PCR: NOT DETECTED
PARAINFLUENZA VIRUS 2 BY PCR: NOT DETECTED
PARAINFLUENZA VIRUS 3 BY PCR: NOT DETECTED
PARAINFLUENZA VIRUS 4 BY PCR: NOT DETECTED
PDW BLD-RTO: 14.7 FL (ref 11.5–15)
PH UA: 5.5 (ref 5–9)
PH VENOUS: 7.34 (ref 7.35–7.45)
PLATELET # BLD: 134 E9/L (ref 130–450)
PMV BLD AUTO: 10.9 FL (ref 7–12)
POIKILOCYTES: ABNORMAL
POTASSIUM REFLEX MAGNESIUM: 5.6 MMOL/L (ref 3.5–5)
PROTEIN UA: ABNORMAL MG/DL
PROTHROMBIN TIME: 28.1 SEC (ref 9.3–12.4)
RBC # BLD: 4.15 E12/L (ref 3.8–5.8)
RBC UA: ABNORMAL /HPF (ref 0–2)
RESPIRATORY SYNCYTIAL VIRUS BY PCR: NOT DETECTED
SARS-COV-2, NAAT: NOT DETECTED
SARS-COV-2, PCR: NOT DETECTED
SODIUM BLD-SCNC: 138 MMOL/L (ref 132–146)
SODIUM URINE: 89 MMOL/L
SPECIFIC GRAVITY UA: >=1.03 (ref 1–1.03)
STREP GRP A PCR: NEGATIVE
TOTAL CK: 94 U/L (ref 20–200)
TOTAL PROTEIN: 7.2 G/DL (ref 6.4–8.3)
UROBILINOGEN, URINE: 0.2 E.U./DL
WBC # BLD: 12.1 E9/L (ref 4.5–11.5)
WBC UA: ABNORMAL /HPF (ref 0–5)

## 2021-07-29 PROCEDURE — 97530 THERAPEUTIC ACTIVITIES: CPT

## 2021-07-29 PROCEDURE — 87635 SARS-COV-2 COVID-19 AMP PRB: CPT

## 2021-07-29 PROCEDURE — 83605 ASSAY OF LACTIC ACID: CPT

## 2021-07-29 PROCEDURE — 82800 BLOOD PH: CPT

## 2021-07-29 PROCEDURE — 6370000000 HC RX 637 (ALT 250 FOR IP): Performed by: PHYSICIAN ASSISTANT

## 2021-07-29 PROCEDURE — 85610 PROTHROMBIN TIME: CPT

## 2021-07-29 PROCEDURE — 82570 ASSAY OF URINE CREATININE: CPT

## 2021-07-29 PROCEDURE — 96365 THER/PROPH/DIAG IV INF INIT: CPT

## 2021-07-29 PROCEDURE — 81001 URINALYSIS AUTO W/SCOPE: CPT

## 2021-07-29 PROCEDURE — 82962 GLUCOSE BLOOD TEST: CPT

## 2021-07-29 PROCEDURE — 6360000002 HC RX W HCPCS: Performed by: PHYSICIAN ASSISTANT

## 2021-07-29 PROCEDURE — 36415 COLL VENOUS BLD VENIPUNCTURE: CPT

## 2021-07-29 PROCEDURE — 71045 X-RAY EXAM CHEST 1 VIEW: CPT

## 2021-07-29 PROCEDURE — 82553 CREATINE MB FRACTION: CPT

## 2021-07-29 PROCEDURE — 87040 BLOOD CULTURE FOR BACTERIA: CPT

## 2021-07-29 PROCEDURE — 87880 STREP A ASSAY W/OPTIC: CPT

## 2021-07-29 PROCEDURE — 2060000000 HC ICU INTERMEDIATE R&B

## 2021-07-29 PROCEDURE — 85025 COMPLETE CBC W/AUTO DIFF WBC: CPT

## 2021-07-29 PROCEDURE — 83690 ASSAY OF LIPASE: CPT

## 2021-07-29 PROCEDURE — 93005 ELECTROCARDIOGRAM TRACING: CPT | Performed by: PHYSICIAN ASSISTANT

## 2021-07-29 PROCEDURE — 87088 URINE BACTERIA CULTURE: CPT

## 2021-07-29 PROCEDURE — 82010 KETONE BODYS QUAN: CPT

## 2021-07-29 PROCEDURE — 97161 PT EVAL LOW COMPLEX 20 MIN: CPT

## 2021-07-29 PROCEDURE — 80053 COMPREHEN METABOLIC PANEL: CPT

## 2021-07-29 PROCEDURE — 72125 CT NECK SPINE W/O DYE: CPT

## 2021-07-29 PROCEDURE — 84300 ASSAY OF URINE SODIUM: CPT

## 2021-07-29 PROCEDURE — 0202U NFCT DS 22 TRGT SARS-COV-2: CPT

## 2021-07-29 PROCEDURE — 2580000003 HC RX 258: Performed by: PHYSICIAN ASSISTANT

## 2021-07-29 PROCEDURE — 70450 CT HEAD/BRAIN W/O DYE: CPT

## 2021-07-29 PROCEDURE — 85730 THROMBOPLASTIN TIME PARTIAL: CPT

## 2021-07-29 PROCEDURE — 82550 ASSAY OF CK (CPK): CPT

## 2021-07-29 RX ORDER — NICOTINE POLACRILEX 4 MG
15 LOZENGE BUCCAL PRN
Status: DISCONTINUED | OUTPATIENT
Start: 2021-07-29 | End: 2021-08-03 | Stop reason: HOSPADM

## 2021-07-29 RX ORDER — TOLTERODINE 4 MG/1
4 CAPSULE, EXTENDED RELEASE ORAL DAILY
Status: DISCONTINUED | OUTPATIENT
Start: 2021-07-29 | End: 2021-07-29 | Stop reason: CLARIF

## 2021-07-29 RX ORDER — POTASSIUM CHLORIDE 20 MEQ/1
40 TABLET, EXTENDED RELEASE ORAL PRN
Status: DISCONTINUED | OUTPATIENT
Start: 2021-07-29 | End: 2021-08-03 | Stop reason: HOSPADM

## 2021-07-29 RX ORDER — LANSOPRAZOLE 30 MG/1
30 CAPSULE, DELAYED RELEASE ORAL DAILY
Status: DISCONTINUED | OUTPATIENT
Start: 2021-07-29 | End: 2021-07-29 | Stop reason: CLARIF

## 2021-07-29 RX ORDER — TROSPIUM CHLORIDE 20 MG/1
20 TABLET, FILM COATED ORAL NIGHTLY
Status: DISCONTINUED | OUTPATIENT
Start: 2021-07-29 | End: 2021-08-03 | Stop reason: HOSPADM

## 2021-07-29 RX ORDER — TAMSULOSIN HYDROCHLORIDE 0.4 MG/1
0.4 CAPSULE ORAL DAILY
Status: DISCONTINUED | OUTPATIENT
Start: 2021-07-29 | End: 2021-08-03 | Stop reason: HOSPADM

## 2021-07-29 RX ORDER — SODIUM CHLORIDE 0.9 % (FLUSH) 0.9 %
10 SYRINGE (ML) INJECTION EVERY 12 HOURS SCHEDULED
Status: DISCONTINUED | OUTPATIENT
Start: 2021-07-29 | End: 2021-08-03 | Stop reason: HOSPADM

## 2021-07-29 RX ORDER — SODIUM CHLORIDE 0.9 % (FLUSH) 0.9 %
10 SYRINGE (ML) INJECTION PRN
Status: DISCONTINUED | OUTPATIENT
Start: 2021-07-29 | End: 2021-08-03 | Stop reason: HOSPADM

## 2021-07-29 RX ORDER — SODIUM POLYSTYRENE SULFONATE 15 G/60ML
15 SUSPENSION ORAL; RECTAL ONCE
Status: COMPLETED | OUTPATIENT
Start: 2021-07-29 | End: 2021-07-29

## 2021-07-29 RX ORDER — SODIUM CHLORIDE 9 MG/ML
25 INJECTION, SOLUTION INTRAVENOUS PRN
Status: DISCONTINUED | OUTPATIENT
Start: 2021-07-29 | End: 2021-08-03 | Stop reason: HOSPADM

## 2021-07-29 RX ORDER — LEVOFLOXACIN 5 MG/ML
750 INJECTION, SOLUTION INTRAVENOUS ONCE
Status: COMPLETED | OUTPATIENT
Start: 2021-07-29 | End: 2021-07-29

## 2021-07-29 RX ORDER — SIMVASTATIN 40 MG
40 TABLET ORAL NIGHTLY
Status: DISCONTINUED | OUTPATIENT
Start: 2021-07-29 | End: 2021-07-29 | Stop reason: CLARIF

## 2021-07-29 RX ORDER — ONDANSETRON 2 MG/ML
4 INJECTION INTRAMUSCULAR; INTRAVENOUS EVERY 6 HOURS PRN
Status: DISCONTINUED | OUTPATIENT
Start: 2021-07-29 | End: 2021-08-03 | Stop reason: HOSPADM

## 2021-07-29 RX ORDER — DEXTROSE MONOHYDRATE 25 G/50ML
12.5 INJECTION, SOLUTION INTRAVENOUS PRN
Status: DISCONTINUED | OUTPATIENT
Start: 2021-07-29 | End: 2021-08-03 | Stop reason: HOSPADM

## 2021-07-29 RX ORDER — SODIUM CHLORIDE 9 MG/ML
INJECTION, SOLUTION INTRAVENOUS CONTINUOUS
Status: DISCONTINUED | OUTPATIENT
Start: 2021-07-29 | End: 2021-07-31

## 2021-07-29 RX ORDER — ACETAMINOPHEN 500 MG
1000 TABLET ORAL ONCE
Status: COMPLETED | OUTPATIENT
Start: 2021-07-29 | End: 2021-07-29

## 2021-07-29 RX ORDER — ACETAMINOPHEN 325 MG/1
650 TABLET ORAL EVERY 6 HOURS PRN
Status: DISCONTINUED | OUTPATIENT
Start: 2021-07-29 | End: 2021-08-01

## 2021-07-29 RX ORDER — WARFARIN SODIUM 5 MG/1
5 TABLET ORAL DAILY
Status: DISCONTINUED | OUTPATIENT
Start: 2021-07-29 | End: 2021-07-31

## 2021-07-29 RX ORDER — ALLOPURINOL 100 MG/1
100 TABLET ORAL DAILY
Status: DISCONTINUED | OUTPATIENT
Start: 2021-07-29 | End: 2021-08-03 | Stop reason: HOSPADM

## 2021-07-29 RX ORDER — FINASTERIDE 5 MG/1
5 TABLET, FILM COATED ORAL DAILY
Status: DISCONTINUED | OUTPATIENT
Start: 2021-07-29 | End: 2021-08-03 | Stop reason: HOSPADM

## 2021-07-29 RX ORDER — ASPIRIN 81 MG/1
81 TABLET, CHEWABLE ORAL DAILY
Status: DISCONTINUED | OUTPATIENT
Start: 2021-07-29 | End: 2021-08-03 | Stop reason: HOSPADM

## 2021-07-29 RX ORDER — ONDANSETRON 4 MG/1
4 TABLET, ORALLY DISINTEGRATING ORAL EVERY 8 HOURS PRN
Status: DISCONTINUED | OUTPATIENT
Start: 2021-07-29 | End: 2021-08-03 | Stop reason: HOSPADM

## 2021-07-29 RX ORDER — POTASSIUM CHLORIDE 7.45 MG/ML
10 INJECTION INTRAVENOUS PRN
Status: DISCONTINUED | OUTPATIENT
Start: 2021-07-29 | End: 2021-08-03 | Stop reason: HOSPADM

## 2021-07-29 RX ORDER — GUAIFENESIN 400 MG/1
400 TABLET ORAL
Status: DISCONTINUED | OUTPATIENT
Start: 2021-07-29 | End: 2021-08-03 | Stop reason: HOSPADM

## 2021-07-29 RX ORDER — ACETAMINOPHEN 650 MG/1
650 SUPPOSITORY RECTAL EVERY 6 HOURS PRN
Status: DISCONTINUED | OUTPATIENT
Start: 2021-07-29 | End: 2021-08-03 | Stop reason: HOSPADM

## 2021-07-29 RX ORDER — PANTOPRAZOLE SODIUM 40 MG/1
40 TABLET, DELAYED RELEASE ORAL
Status: DISCONTINUED | OUTPATIENT
Start: 2021-07-29 | End: 2021-08-03 | Stop reason: HOSPADM

## 2021-07-29 RX ORDER — CETIRIZINE HYDROCHLORIDE 10 MG/1
5 TABLET ORAL DAILY
Status: DISCONTINUED | OUTPATIENT
Start: 2021-07-29 | End: 2021-08-03 | Stop reason: HOSPADM

## 2021-07-29 RX ORDER — GUAIFENESIN 600 MG/1
1200 TABLET, EXTENDED RELEASE ORAL 2 TIMES DAILY
Status: DISCONTINUED | OUTPATIENT
Start: 2021-07-29 | End: 2021-07-29 | Stop reason: CLARIF

## 2021-07-29 RX ORDER — HYDRALAZINE HYDROCHLORIDE 20 MG/ML
10 INJECTION INTRAMUSCULAR; INTRAVENOUS EVERY 6 HOURS PRN
Status: DISCONTINUED | OUTPATIENT
Start: 2021-07-29 | End: 2021-08-03 | Stop reason: HOSPADM

## 2021-07-29 RX ORDER — DEXTROSE MONOHYDRATE 50 MG/ML
100 INJECTION, SOLUTION INTRAVENOUS PRN
Status: DISCONTINUED | OUTPATIENT
Start: 2021-07-29 | End: 2021-08-03 | Stop reason: HOSPADM

## 2021-07-29 RX ORDER — HYDROCODONE BITARTRATE AND ACETAMINOPHEN 7.5; 325 MG/1; MG/1
1 TABLET ORAL EVERY 6 HOURS PRN
Status: DISCONTINUED | OUTPATIENT
Start: 2021-07-29 | End: 2021-08-03 | Stop reason: HOSPADM

## 2021-07-29 RX ORDER — ATORVASTATIN CALCIUM 20 MG/1
20 TABLET, FILM COATED ORAL NIGHTLY
Status: DISCONTINUED | OUTPATIENT
Start: 2021-07-29 | End: 2021-08-03 | Stop reason: HOSPADM

## 2021-07-29 RX ADMIN — GUAIFENESIN 400 MG: 400 TABLET ORAL at 13:38

## 2021-07-29 RX ADMIN — SODIUM CHLORIDE 1000 ML: 9 INJECTION, SOLUTION INTRAVENOUS at 13:37

## 2021-07-29 RX ADMIN — SODIUM CHLORIDE, PRESERVATIVE FREE 10 ML: 5 INJECTION INTRAVENOUS at 13:39

## 2021-07-29 RX ADMIN — ALLOPURINOL 100 MG: 100 TABLET ORAL at 13:38

## 2021-07-29 RX ADMIN — INSULIN LISPRO 2 UNITS: 100 INJECTION, SOLUTION INTRAVENOUS; SUBCUTANEOUS at 18:06

## 2021-07-29 RX ADMIN — TROSPIUM CHLORIDE 20 MG: 20 TABLET, FILM COATED ORAL at 20:29

## 2021-07-29 RX ADMIN — ACETAMINOPHEN 1000 MG: 500 TABLET ORAL at 01:39

## 2021-07-29 RX ADMIN — PANTOPRAZOLE SODIUM 40 MG: 40 TABLET, DELAYED RELEASE ORAL at 13:38

## 2021-07-29 RX ADMIN — GUAIFENESIN 400 MG: 400 TABLET ORAL at 18:05

## 2021-07-29 RX ADMIN — HYDROCODONE BITARTRATE AND ACETAMINOPHEN 1 TABLET: 7.5; 325 TABLET ORAL at 20:29

## 2021-07-29 RX ADMIN — GUAIFENESIN 400 MG: 400 TABLET ORAL at 20:29

## 2021-07-29 RX ADMIN — LEVOFLOXACIN 750 MG: 5 INJECTION, SOLUTION INTRAVENOUS at 02:33

## 2021-07-29 RX ADMIN — TAMSULOSIN HYDROCHLORIDE 0.4 MG: 0.4 CAPSULE ORAL at 13:38

## 2021-07-29 RX ADMIN — WARFARIN SODIUM 5 MG: 5 TABLET ORAL at 18:05

## 2021-07-29 RX ADMIN — INSULIN LISPRO 2 UNITS: 100 INJECTION, SOLUTION INTRAVENOUS; SUBCUTANEOUS at 13:39

## 2021-07-29 RX ADMIN — CETIRIZINE HYDROCHLORIDE 5 MG: 10 TABLET, FILM COATED ORAL at 13:38

## 2021-07-29 RX ADMIN — ENOXAPARIN SODIUM 30 MG: 30 INJECTION SUBCUTANEOUS at 14:45

## 2021-07-29 RX ADMIN — ATORVASTATIN CALCIUM 20 MG: 20 TABLET, FILM COATED ORAL at 20:29

## 2021-07-29 RX ADMIN — FINASTERIDE 5 MG: 5 TABLET, FILM COATED ORAL at 18:06

## 2021-07-29 RX ADMIN — ASPIRIN 81 MG 81 MG: 81 TABLET ORAL at 13:30

## 2021-07-29 RX ADMIN — SODIUM POLYSTYRENE SULFONATE 15 G: 15 SUSPENSION ORAL; RECTAL at 04:47

## 2021-07-29 ASSESSMENT — PAIN SCALES - PAIN ASSESSMENT IN ADVANCED DEMENTIA (PAINAD)
FACIALEXPRESSION: 0
CONSOLABILITY: 0
BREATHING: 0
NEGVOCALIZATION: 0
TOTALSCORE: 0
BODYLANGUAGE: 0

## 2021-07-29 ASSESSMENT — PAIN SCALES - GENERAL
PAINLEVEL_OUTOF10: 0
PAINLEVEL_OUTOF10: 0
PAINLEVEL_OUTOF10: 7

## 2021-07-29 ASSESSMENT — PAIN DESCRIPTION - PAIN TYPE: TYPE: ACUTE PAIN

## 2021-07-29 ASSESSMENT — PAIN - FUNCTIONAL ASSESSMENT: PAIN_FUNCTIONAL_ASSESSMENT: PREVENTS OR INTERFERES SOME ACTIVE ACTIVITIES AND ADLS

## 2021-07-29 ASSESSMENT — PAIN DESCRIPTION - FREQUENCY: FREQUENCY: INTERMITTENT

## 2021-07-29 ASSESSMENT — PAIN DESCRIPTION - ORIENTATION: ORIENTATION: RIGHT

## 2021-07-29 ASSESSMENT — PAIN DESCRIPTION - LOCATION: LOCATION: THROAT

## 2021-07-29 ASSESSMENT — PAIN DESCRIPTION - DESCRIPTORS: DESCRIPTORS: DISCOMFORT

## 2021-07-29 ASSESSMENT — PAIN DESCRIPTION - PROGRESSION: CLINICAL_PROGRESSION: NOT CHANGED

## 2021-07-29 ASSESSMENT — PAIN DESCRIPTION - ONSET: ONSET: OTHER (COMMENT)

## 2021-07-29 NOTE — H&P
[address] [facility]  History and Physical      CHIEF COMPLAINT:    Chief Complaint   Patient presents with    Altered Mental Status     \"worse than normal\" per EMS from family; hx dementia    Shaking     increased from normal    Pharyngitis    Neck Pain        Patient of Nakul Cummins MD presents with:  DEMI (acute kidney injury) ([facility] [address]    History of Present Illness:   Patient is an 81-year-old male with a past medical history of dementia, COPD, DM, hyperlipidemia, and hypertension. Patient does have a history of kidney stones and bladder cancer. Patient presented to the ED with complaints of altered mental status x1 day. Patient states however for the past 2 weeks he has not been able to eat or drink much due to pain in his throat and neck. Patient states there are no aggravating factors and there are no relieving factors. Patient's family is at bedside to assist with assessment. Patient denies any chest pain, fever, chills, shortness of breath (he does wear O2 baseline 8 L at home), nausea, headache, and abdominal pain. ER work-up revealed an elevated BUN/creatinine 49/2.4, elevated white count 12.1. Patient was admitted to telemetry unit for further testing and treatment. REVIEW OF SYSTEMS:  Pertinent negatives are above in HPI. 10 point ROS otherwise negative.       Past Medical History:   Diagnosis Date    COPD (chronic obstructive pulmonary disease) ([facility] [address]     Diabetes mellitus ([facility] [address]     Hyperlipidemia     Hypertension          Past Surgical History:   Procedure Laterality Date    LITHOTRIPSY N/A 9/24/2019    CYSTOSCOPY RETROGRADE PYELOGRAM URETEROSCOPY J STENT LASER LITHOTRIPSY performed by Marianne Gilbert MD at [place] [address] LITHOTRIPSY Left 11/26/2019    LEFT CYSTOSCOPY RETROGRADE PYELOGRAM URETEROSCOPY STENT INSERTION performed by Dorene Gaona MD at [facility] [address] OR       Medications Prior to Admission:    Medications Prior to Admission: allopurinol (ZYLOPRIM) 100 MG tablet, Take 100 mg by mouth daily  aspirin 81 MG tablet, Take 81 mg by mouth daily  tolterodine (DETROL LA) 4 MG extended release capsule, Take 4 mg by mouth daily  finasteride (PROSCAR) 5 MG tablet, Take 5 mg by mouth daily  tamsulosin (FLOMAX) 0.4 MG capsule, Take 0.4 mg by mouth daily  glimepiride (AMARYL) 4 MG tablet, Take 4 mg by mouth every morning (before breakfast)  lansoprazole (PREVACID) 30 MG delayed release capsule, Take 30 mg by mouth daily  insulin glargine (LANTUS) 100 UNIT/ML injection vial, Inject 32 Units into the skin nightly   losartan (COZAAR) 25 MG tablet, Take 25 mg by mouth daily  guaiFENesin (MUCINEX) 600 MG extended release tablet, Take 1,200 mg by mouth 2 times daily  simvastatin (ZOCOR) 40 MG tablet, Take 40 mg by mouth nightly  HYDROcodone-acetaminophen (NORCO) 7.5-325 MG per tablet, Take 1 tablet by mouth every 6 hours as needed for Pain. .  cetirizine (ZYRTEC) 10 MG tablet, Take 10 mg by mouth daily  SITagliptin (JANUVIA) 100 MG tablet, Take 100 mg by mouth daily  [DISCONTINUED] ibuprofen (ADVIL;MOTRIN) 800 MG tablet, Take 1 tablet by mouth every 8 hours as needed for Pain  warfarin (COUMADIN) 5 MG tablet, Take by mouth  OXYGEN, Inhale into the lungs Wears 8L NC @ HS only    Note that the patient's home medications were reviewed and the above list is accurate to the best of my knowledge at the time of the exam.    Allergies:    Penicillin g, Penicillins, and Tetanus toxoids    Social History:    reports that he has quit smoking. He has never used smokeless tobacco. He reports that he does not drink alcohol and does not use drugs.     Family History:   Unable to obtain at this time      PHYSICAL EXAM:    Vitals:  /67   Pulse 78   Temp 98.5 °F (36.9 °C) (Oral)   Resp 20   Ht 5' 6\" (1.676 m)   Wt 198 lb (89.8 kg)   SpO2 93%   BMI 31.96 kg/m²       General appearance: NAD, conversant, fatigued  Eyes: Sclerae anicteric, PERRLA  HEENT: AT/NC, MMM  Neck: FROM, supple, no thyromegaly  Lymph: No cervical / supraclavicular lymphadenopathy  Lungs: Clear to auscultation, WOB normal  CV: RRR, no MRGs, no lower extremity edema  Abdomen: Soft, non-tender; no masses or HSM, +BS  Extremities: FROM without synovitis. No clubbing or cyanosis of the hands. Skin: no rash, induration, lesions, or ulcers  Psych: Calm and cooperative. Normal judgement and insight. Normal mood and affect. Neuro: Alert and interactive, face symmetric, speech fluent. LABS:  All labs reviewed. Of note:  CBC with Differential:    Lab Results   Component Value Date    WBC 12.1 07/29/2021    RBC 4.15 07/29/2021    HGB 12.6 07/29/2021    HCT 38.4 07/29/2021     07/29/2021    MCV 92.5 07/29/2021    MCH 30.4 07/29/2021    MCHC 32.8 07/29/2021    RDW 14.7 07/29/2021    LYMPHOPCT 4.4 07/29/2021    MONOPCT 4.1 07/29/2021    BASOPCT 0.2 07/29/2021    MONOSABS 0.50 07/29/2021    LYMPHSABS 0.53 07/29/2021    EOSABS 0.01 07/29/2021    BASOSABS 0.03 07/29/2021     CMP:    Lab Results   Component Value Date     07/29/2021    K 5.6 07/29/2021     07/29/2021    CO2 19 07/29/2021    BUN 49 07/29/2021    CREATININE 2.4 07/29/2021    GFRAA 31 07/29/2021    LABGLOM 26 07/29/2021    GLUCOSE 207 07/29/2021    PROT 7.2 07/29/2021    LABALBU 3.7 07/29/2021    CALCIUM 8.5 07/29/2021    BILITOT 0.9 07/29/2021    ALKPHOS 90 07/29/2021    AST 12 07/29/2021    ALT 12 07/29/2021       Imaging:  CXR: WNL    CT head: Generalized atrophy and chronic small vessel ischemic white matter disease. CT cervical spine: Severe multilevel degenerative changes in the cervical spine with no acute fracture or traumatic malalignment. There is a large central disc herniation at C3-C4 causing moderate to severe cervical cord flattening.     EKG:  NSR    Telemetry:  I've personally reviewed the patient's telemetry:  NSR    ASSESSMENT/PLAN:  Principal Problem:    DEMI (acute kidney injury) ([facility] [address]  Active Problems:    Diabetes mellitus type 2, uncontrolled ([address]    Fever    Hyperkalemia  Resolved Problems:    * No resolved hospital problems. *    18-year-old male with a past medical history of DM, hypertension, and COPD admitted to telemetry unit with    Acute kidney injury  -IV hydration  -Monitor labs  -Supplement O2 to keep saturation greater than 93%    Diabetes mellitus  -Monitor labs  -ISS glucose control  -Hypoglycemia protocol initiated  -Discuss diet modification      Medication for other comorbidities continue as appropriate dose adjustment as necessary. DVT prophylaxis  PT OT  Discharge planning  Case discussed with attending and agreed upon plan of care. Code status: Full  Requires inpatient level of care  GORDY Delvalle - CNP    4:39 PM  7/29/2021     Patient resting comfortably in no apparent acute distress  Admitted for acute renal failure  Patient unable to provide much of history for me  Continue IV fluid hydration and stop nephrotoxins  Daily checks of BMP    I personally saw, examined and provided care for the patient. Radiographs, labs and medication list were reviewed by me independently. The case was discussed in detail and plans for care were established. Review of [address] [place] [address], documentation was conducted and revisions were made as appropriate directly by me. I agree with the above documented exam, problem list, and plan of care.      Jessica Pitts MD  7/29/2021

## 2021-07-29 NOTE — PLAN OF CARE
Pt admitted to rm 605 approx. 1045 this am from ER. Bed alarm placed on and pt instructed to call for assistance.

## 2021-07-29 NOTE — PROGRESS NOTES
Physical Therapy    Facility/Department: [facility] MED SURG  Initial Assessment    NAME: Mode Richter  : 1935  MRN: [id number]    Date of Service: 2021      Attending Provider:  Ovidio Núñez MD    Evaluating PT:  Alexys Prince. Emely Zheng P.T. Room #:  0605/0605-A  Diagnosis:  General weakness [R53.1]  Pertinent PMHx/PSHx:  Alzheimer's Disease  Precautions:  Falls, bed/chair alarm    SUBJECTIVE:    Pt lives with his wife in a 1 story home with no stairs. Pt ambulated with SPC PTA. OBJECTIVE:   Initial Evaluation  Date: 21 Treatment Short Term/ Long Term   Goals   Was pt agreeable to Eval/treatment? yes     Does pt have pain? C/o sore throat     Bed Mobility  Rolling: supervision  Supine to sit: supervision  Sit to supine: NA  Scooting: supervision  Independent   Transfers Sit to stand: SBA  Stand to sit: SBA  Stand pivot: SBA  supervision   Ambulation   15+40 feet with ww SBA  200 feet with AAD supervision   Stair negotiation: ascended and descended NA  NA   AM-PAC 6 Clicks        BLE ROM is WFL. BLE strength is grossly 4/5 to 4+/5. Edema:  None noted  Balance: sitting is Independent and standing with no AD or with ww is SBA  Endurance: fair    ASSESSMENT:    Conditions Requiring Skilled Therapeutic Intervention:    [x]Decreased strength     []Decreased ROM  [x]Decreased functional mobility  [x]Decreased balance   [x]Decreased endurance   []Decreased posture  []Decreased sensation  []Decreased coordination   []Decreased vision  []Decreased safety awareness   []Increased pain       Comments:  Pt was in bed and was able to answer social history questions which his answers did match previous notes information. He knew he was 81 y.o. and at [facility] due to not feeling well. Pt sat on EOB and reported need to use BR. He walked with ww (his cane was not here) into BR and transferred on/off commode with SBA.  After having BM, he was able to perform self hygiene care and then stood at sink with SBA while he washed his hands. He walked in the room before sitting down in chair. Treatment:  Patient practiced and was instructed in the following treatment:     Bed mobility, transfers, ADLs, and gait with ww to improve functional strength and endurance. Pt was left sitting up in chair with lunch tray placed in front of him and call light left by patient, RN was aware. Chair/bed alarm: chair alarm was activated. Pt's/ family goals   1. To go home. Patient and or family understand(s) diagnosis, prognosis, and plan of care. PHYSICAL THERAPY PLAN OF CARE:    PT POC is established based on physician order and patient diagnosis     Referring provider/PT Order:  PT eval and treat  Diagnosis:  General weakness [R53.1]  Specific instructions for next treatment:  Increase amb distance. Current Treatment Recommendations:     [x] Strengthening to improve independence with functional mobility   [] ROM to improve ROM and decrease spasm and pain which will help promote independence with functional mobility   [x] Balance Training to improve static/dynamic balance and to reduce fall risk  [x] Endurance Training to improve activity tolerance during functional mobility   [x] Transfer Training to improve safety and independence with all functional transfers   [x] Gait Training to improve gait mechanics, endurance and assess need for appropriate assistive device  [] Stair Training in preparation for safe discharge home and/or into the community   [] Positioning to prevent skin breakdown and contractures  [] Safety and Education Training   [x] Patient/Caregiver Education   [] HEP  [] Other     PT long term treatment goals are located in above grid    Frequency of treatments: 2-5x/week x 1-2 weeks.     Time in  13:10  Time out  13:35    Total Treatment Time  12 minutes     Evaluation Time includes thorough review of current medical information, gathering information on past medical history/social history and prior level of function, completion of standardized testing/informal observation of tasks, assessment of data and education on plan of care and goals. CPT codes:  [x] Low Complexity PT evaluation 14383  [] Moderate Complexity PT evaluation 42410  [] High Complexity PT evaluation 96139  [] PT Re-evaluation 26690  [] Gait training 96150 ** minutes  [] Manual therapy 26511 ** minutes  [x] Therapeutic activities 62854 12 minutes  [] Therapeutic exercises 21413 ** minutes  [] Neuromuscular reeducation 33352 ** minutes     Kemar Shahs., P.T.   License Number: [id number]

## 2021-07-29 NOTE — ED PROVIDER NOTES
ED Attending  CC: No  HPI:  7/29/21, Time: 12:08 AM EDT         Sarah Choudhury is a 81 y.o. male presenting to the ED for altered mental status beginning yesterday . The complaint has been persistent, moderate in severity, and worsened by nothing. Patient Is brought in by EMS for altered mental status. Wife is at bedside states that he has been complaining of sore throat neck pain and has worsening confusion since yesterday. He does have history of Alzheimer's. Wife states he is not complain of any chest pain or abdominal pain. She has not noticed any cough. She states he does take Norco for pain and last took it around 4 PM    Review of Systems: History is limited by patient's mental status  A complete review of systems was performed and pertinent positives and negatives are stated within HPI, all other systems reviewed and are negative.          --------------------------------------------- PAST HISTORY ---------------------------------------------  Past Medical History:  has a past medical history of COPD (chronic obstructive pulmonary disease) ([facility] [address] Diabetes mellitus ([facility] Hyperlipidemia, and Hypertension. Past Surgical History:  has a past surgical history that includes Lithotripsy (N/A, 9/24/2019) and Lithotripsy (Left, 11/26/2019). Social History:  reports that he has quit smoking. He has never used smokeless tobacco. He reports that he does not drink alcohol and does not use drugs. Family History: family history is not on file. The patients home medications have been reviewed.     Allergies: Penicillin g, Penicillins, and Tetanus toxoids    -------------------------------------------------- RESULTS -------------------------------------------------  All laboratory and radiology results have been personally reviewed by myself   LABS:  Results for orders placed or performed during the hospital encounter of 07/28/21   Strep Screen Group A Throat    Specimen: Throat   Result Value Ref Range Strep Grp A PCR Negative Negative   COVID-19, Rapid    Specimen: Nasopharyngeal Swab   Result Value Ref Range    SARS-CoV-2, NAAT Not Detected Not Detected   Lactate, Sepsis   Result Value Ref Range    Lactic Acid, Sepsis 1.2 0.5 - 1.9 mmol/L   CBC auto differential   Result Value Ref Range    WBC 12.1 (H) 4.5 - 11.5 E9/L    RBC 4.15 3.80 - 5.80 E12/L    Hemoglobin 12.6 12.5 - 16.5 g/dL    Hematocrit 38.4 37.0 - 54.0 %    MCV 92.5 80.0 - 99.9 fL    MCH 30.4 26.0 - 35.0 pg    MCHC 32.8 32.0 - 34.5 %    RDW 14.7 11.5 - 15.0 fL    Platelets 835 274 - 659 E9/L    MPV 10.9 7.0 - 12.0 fL    Neutrophils % 90.7 (H) 43.0 - 80.0 %    Immature Granulocytes % 0.5 0.0 - 5.0 %    Lymphocytes % 4.4 (L) 20.0 - 42.0 %    Monocytes % 4.1 2.0 - 12.0 %    Eosinophils % 0.1 0.0 - 6.0 %    Basophils % 0.2 0.0 - 2.0 %    Neutrophils Absolute 10.94 (H) 1.80 - 7.30 E9/L    Immature Granulocytes # 0.06 E9/L    Lymphocytes Absolute 0.53 (L) 1.50 - 4.00 E9/L    Monocytes Absolute 0.50 0.10 - 0.95 E9/L    Eosinophils Absolute 0.01 (L) 0.05 - 0.50 E9/L    Basophils Absolute 0.03 0.00 - 0.20 E9/L    Poikilocytes 1+     Ovalocytes 1+    Comprehensive Metabolic Panel w/ Reflex to MG   Result Value Ref Range    Sodium 138 132 - 146 mmol/L    Potassium reflex Magnesium 5.6 (H) 3.5 - 5.0 mmol/L    Chloride 108 (H) 98 - 107 mmol/L    CO2 19 (L) 22 - 29 mmol/L    Anion Gap 11 7 - 16 mmol/L    Glucose 228 (H) 74 - 99 mg/dL    BUN 49 (H) 6 - 23 mg/dL    CREATININE 2.4 (H) 0.7 - 1.2 mg/dL    GFR Non-African American 26 >=60 mL/min/1.73    GFR African American 31     Calcium 8.5 (L) 8.6 - 10.2 mg/dL    Total Protein 7.2 6.4 - 8.3 g/dL    Albumin 3.7 3.5 - 5.2 g/dL    Total Bilirubin 0.9 0.0 - 1.2 mg/dL    Alkaline Phosphatase 90 40 - 129 U/L    ALT 12 0 - 40 U/L    AST 12 0 - 39 U/L   Urinalysis   Result Value Ref Range    Color, UA Yellow Straw/Yellow    Clarity, UA Clear Clear    Glucose, Ur Negative Negative mg/dL    Bilirubin Urine SMALL (A) Negative Ketones, Urine TRACE (A) Negative mg/dL    Specific Gravity, UA >=1.030 1.005 - 1.030    Blood, Urine Negative Negative    pH, UA 5.5 5.0 - 9.0    Protein, UA TRACE Negative mg/dL    Urobilinogen, Urine 0.2 <2.0 E.U./dL    Nitrite, Urine Negative Negative    Leukocyte Esterase, Urine Negative Negative   Lipase   Result Value Ref Range    Lipase 24 13 - 60 U/L   CK   Result Value Ref Range    Total CK 94 20 - 200 U/L   CK-MB   Result Value Ref Range    CK-MB 1.6 0.0 - 7.7 ng/mL   Beta-Hydroxybutyrate   Result Value Ref Range    Beta-Hydroxybutyrate 1.32 (H) 0.02 - 0.27 mmol/L   pH, venous   Result Value Ref Range    pH, Aly 7.34 (L) 7.35 - 7.45   Protime-INR   Result Value Ref Range    Protime 28.1 (H) 9.3 - 12.4 sec    INR 2.6    APTT   Result Value Ref Range    aPTT 31.4 24.5 - 35.1 sec   Microscopic Urinalysis   Result Value Ref Range    Hyaline Casts, UA 3-5 (A) 0 - 2 /LPF    WBC, UA 1-3 0 - 5 /HPF    RBC, UA 1-3 0 - 2 /HPF    Epithelial Cells, UA FEW /HPF    Bacteria, UA RARE (A) None Seen /HPF   POCT glucose   Result Value Ref Range    Glucose 207 mg/dL    QC OK? yes    POCT Glucose   Result Value Ref Range    Meter Glucose 207 (H) 74 - 99 mg/dL   EKG 12 lead   Result Value Ref Range    Ventricular Rate 93 BPM    Atrial Rate 93 BPM    P-R Interval 208 ms    QRS Duration 122 ms    Q-T Interval 390 ms    QTc Calculation (Bazett) 484 ms    P Axis 46 degrees    R Axis 92 degrees    T Axis 28 degrees       RADIOLOGY:  Interpreted by Radiologist.  CT HEAD WO CONTRAST   Final Result   No acute intracranial abnormality. Generalized atrophy and chronic small vessel ischemic white matter disease. CT CERVICAL SPINE WO CONTRAST   Final Result   Severe multilevel degenerative changes in the cervical spine with no acute   fracture traumatic malalignment. There is a large central disc herniation at C3-C4 causing moderate to severe   cervical cord flattening.          XR CHEST 1 VIEW   Final Result   No Impression: Normal sinus rhythm  Comparison to Prior tracings: There are no significant changes when compared to prior tracings. [id number] discussed with physician assistant Tanya moss covering for Dr. Lynne Tidwell she is agreeable to admission to intermediate bed      Medical Decision Making:    Patient is brought in with complaint of increased confusion fever. Strep, Covid negative no finding of pneumonia or urinary tract infection. Patient with chronic renal insufficiency with elevated potassium given dose Kayexalate will admit to intermediate floor    Counseling: The emergency provider has spoken with the patient and discussed todays results, in addition to providing specific details for the plan of care and counseling regarding the diagnosis and prognosis. Questions are answered at this time and they are agreeable with the plan.      --------------------------------- IMPRESSION AND DISPOSITION ---------------------------------    IMPRESSION  1. Cervical disc disease    2. Fever, unspecified fever cause    3. General weakness    4. Confusion        DISPOSITION  Disposition: Admit to telemetry  Patient condition is fair      NOTE: This report was transcribed using voice recognition software.  Every effort was made to ensure accuracy; however, inadvertent computerized transcription errors may be present     Felice Loza  07/29/21 0177

## 2021-07-30 ENCOUNTER — APPOINTMENT (OUTPATIENT)
Dept: GENERAL RADIOLOGY | Age: 86
DRG: 683 | End: 2021-07-30
Payer: MEDICARE

## 2021-07-30 LAB
ANION GAP SERPL CALCULATED.3IONS-SCNC: 5 MMOL/L (ref 7–16)
BUN BLDV-MCNC: 41 MG/DL (ref 6–23)
CALCIUM SERPL-MCNC: 8.1 MG/DL (ref 8.6–10.2)
CHLORIDE BLD-SCNC: 111 MMOL/L (ref 98–107)
CO2: 22 MMOL/L (ref 22–29)
CREAT SERPL-MCNC: 2 MG/DL (ref 0.7–1.2)
GFR AFRICAN AMERICAN: 39
GFR NON-AFRICAN AMERICAN: 32 ML/MIN/1.73
GLUCOSE BLD-MCNC: 139 MG/DL (ref 74–99)
HCT VFR BLD CALC: 34.3 % (ref 37–54)
HEMOGLOBIN: 11.3 G/DL (ref 12.5–16.5)
INR BLD: 2.4
MCH RBC QN AUTO: 30.5 PG (ref 26–35)
MCHC RBC AUTO-ENTMCNC: 32.9 % (ref 32–34.5)
MCV RBC AUTO: 92.5 FL (ref 80–99.9)
METER GLUCOSE: 119 MG/DL (ref 74–99)
METER GLUCOSE: 156 MG/DL (ref 74–99)
METER GLUCOSE: 162 MG/DL (ref 74–99)
METER GLUCOSE: 187 MG/DL (ref 74–99)
PDW BLD-RTO: 14.7 FL (ref 11.5–15)
PLATELET # BLD: 116 E9/L (ref 130–450)
PMV BLD AUTO: 10.7 FL (ref 7–12)
POTASSIUM REFLEX MAGNESIUM: 4.8 MMOL/L (ref 3.5–5)
POTASSIUM SERPL-SCNC: 4.8 MMOL/L (ref 3.5–5)
PROTHROMBIN TIME: 26.7 SEC (ref 9.3–12.4)
RBC # BLD: 3.71 E12/L (ref 3.8–5.8)
SODIUM BLD-SCNC: 138 MMOL/L (ref 132–146)
WBC # BLD: 8.9 E9/L (ref 4.5–11.5)

## 2021-07-30 PROCEDURE — 92526 ORAL FUNCTION THERAPY: CPT | Performed by: SPEECH-LANGUAGE PATHOLOGIST

## 2021-07-30 PROCEDURE — 2700000000 HC OXYGEN THERAPY PER DAY

## 2021-07-30 PROCEDURE — 6360000002 HC RX W HCPCS: Performed by: FAMILY MEDICINE

## 2021-07-30 PROCEDURE — 2580000003 HC RX 258: Performed by: PHYSICIAN ASSISTANT

## 2021-07-30 PROCEDURE — 82962 GLUCOSE BLOOD TEST: CPT

## 2021-07-30 PROCEDURE — 85610 PROTHROMBIN TIME: CPT

## 2021-07-30 PROCEDURE — 36415 COLL VENOUS BLD VENIPUNCTURE: CPT

## 2021-07-30 PROCEDURE — 80048 BASIC METABOLIC PNL TOTAL CA: CPT

## 2021-07-30 PROCEDURE — 92611 MOTION FLUOROSCOPY/SWALLOW: CPT | Performed by: SPEECH-LANGUAGE PATHOLOGIST

## 2021-07-30 PROCEDURE — 2500000003 HC RX 250 WO HCPCS: Performed by: RADIOLOGY

## 2021-07-30 PROCEDURE — 2580000003 HC RX 258: Performed by: INTERNAL MEDICINE

## 2021-07-30 PROCEDURE — 2060000000 HC ICU INTERMEDIATE R&B

## 2021-07-30 PROCEDURE — 6370000000 HC RX 637 (ALT 250 FOR IP): Performed by: PHYSICIAN ASSISTANT

## 2021-07-30 PROCEDURE — 2500000003 HC RX 250 WO HCPCS: Performed by: STUDENT IN AN ORGANIZED HEALTH CARE EDUCATION/TRAINING PROGRAM

## 2021-07-30 PROCEDURE — 6360000002 HC RX W HCPCS: Performed by: STUDENT IN AN ORGANIZED HEALTH CARE EDUCATION/TRAINING PROGRAM

## 2021-07-30 PROCEDURE — 85027 COMPLETE CBC AUTOMATED: CPT

## 2021-07-30 PROCEDURE — 74230 X-RAY XM SWLNG FUNCJ C+: CPT

## 2021-07-30 RX ORDER — LIDOCAINE HYDROCHLORIDE AND EPINEPHRINE 10; 10 MG/ML; UG/ML
20 INJECTION, SOLUTION INFILTRATION; PERINEURAL ONCE
Status: DISCONTINUED | OUTPATIENT
Start: 2021-07-30 | End: 2021-07-30

## 2021-07-30 RX ORDER — CLINDAMYCIN PHOSPHATE 600 MG/50ML
600 INJECTION INTRAVENOUS EVERY 8 HOURS
Status: COMPLETED | OUTPATIENT
Start: 2021-07-30 | End: 2021-07-30

## 2021-07-30 RX ORDER — DEXAMETHASONE SODIUM PHOSPHATE 4 MG/ML
10 INJECTION, SOLUTION INTRA-ARTICULAR; INTRALESIONAL; INTRAMUSCULAR; INTRAVENOUS; SOFT TISSUE EVERY 8 HOURS
Status: COMPLETED | OUTPATIENT
Start: 2021-07-30 | End: 2021-07-31

## 2021-07-30 RX ORDER — SODIUM CHLORIDE 9 MG/ML
INJECTION, SOLUTION INTRAVENOUS CONTINUOUS
Status: DISCONTINUED | OUTPATIENT
Start: 2021-07-30 | End: 2021-08-03 | Stop reason: HOSPADM

## 2021-07-30 RX ORDER — LORAZEPAM 2 MG/ML
0.5 INJECTION INTRAMUSCULAR EVERY 4 HOURS PRN
Status: DISCONTINUED | OUTPATIENT
Start: 2021-07-30 | End: 2021-08-03 | Stop reason: HOSPADM

## 2021-07-30 RX ADMIN — CLINDAMYCIN PHOSPHATE 600 MG: 600 INJECTION, SOLUTION INTRAVENOUS at 19:29

## 2021-07-30 RX ADMIN — BARIUM SULFATE 10 ML: 400 PASTE ORAL at 12:20

## 2021-07-30 RX ADMIN — ASPIRIN 81 MG 81 MG: 81 TABLET ORAL at 10:08

## 2021-07-30 RX ADMIN — DEXAMETHASONE SODIUM PHOSPHATE 10 MG: 4 INJECTION, SOLUTION INTRAMUSCULAR; INTRAVENOUS at 18:26

## 2021-07-30 RX ADMIN — GUAIFENESIN 400 MG: 400 TABLET ORAL at 10:08

## 2021-07-30 RX ADMIN — FINASTERIDE 5 MG: 5 TABLET, FILM COATED ORAL at 10:08

## 2021-07-30 RX ADMIN — PANTOPRAZOLE SODIUM 40 MG: 40 TABLET, DELAYED RELEASE ORAL at 05:51

## 2021-07-30 RX ADMIN — TAMSULOSIN HYDROCHLORIDE 0.4 MG: 0.4 CAPSULE ORAL at 10:08

## 2021-07-30 RX ADMIN — INSULIN LISPRO 1 UNITS: 100 INJECTION, SOLUTION INTRAVENOUS; SUBCUTANEOUS at 20:40

## 2021-07-30 RX ADMIN — HYDROCODONE BITARTRATE AND ACETAMINOPHEN 1 TABLET: 7.5; 325 TABLET ORAL at 05:50

## 2021-07-30 RX ADMIN — BARIUM SULFATE 120 ML: 400 SUSPENSION ORAL at 12:20

## 2021-07-30 RX ADMIN — BARIUM SULFATE 70 G: 0.81 POWDER, FOR SUSPENSION ORAL at 12:20

## 2021-07-30 RX ADMIN — SODIUM CHLORIDE 1000 ML: 9 INJECTION, SOLUTION INTRAVENOUS at 17:52

## 2021-07-30 RX ADMIN — LORAZEPAM 0.5 MG: 2 INJECTION INTRAMUSCULAR; INTRAVENOUS at 20:01

## 2021-07-30 RX ADMIN — ALLOPURINOL 100 MG: 100 TABLET ORAL at 10:08

## 2021-07-30 RX ADMIN — GUAIFENESIN 400 MG: 400 TABLET ORAL at 05:51

## 2021-07-30 RX ADMIN — CETIRIZINE HYDROCHLORIDE 5 MG: 10 TABLET, FILM COATED ORAL at 10:08

## 2021-07-30 RX ADMIN — INSULIN LISPRO 2 UNITS: 100 INJECTION, SOLUTION INTRAVENOUS; SUBCUTANEOUS at 11:25

## 2021-07-30 RX ADMIN — SODIUM CHLORIDE, PRESERVATIVE FREE 10 ML: 5 INJECTION INTRAVENOUS at 20:42

## 2021-07-30 ASSESSMENT — PAIN DESCRIPTION - DESCRIPTORS
DESCRIPTORS: DISCOMFORT;SORE
DESCRIPTORS: DISCOMFORT

## 2021-07-30 ASSESSMENT — PAIN DESCRIPTION - FREQUENCY
FREQUENCY: INTERMITTENT
FREQUENCY: INTERMITTENT

## 2021-07-30 ASSESSMENT — PAIN SCALES - GENERAL
PAINLEVEL_OUTOF10: 7
PAINLEVEL_OUTOF10: 0

## 2021-07-30 ASSESSMENT — PAIN DESCRIPTION - PROGRESSION: CLINICAL_PROGRESSION: NOT CHANGED

## 2021-07-30 ASSESSMENT — PAIN DESCRIPTION - LOCATION
LOCATION: THROAT
LOCATION: THROAT

## 2021-07-30 ASSESSMENT — PAIN DESCRIPTION - ORIENTATION: ORIENTATION: RIGHT

## 2021-07-30 ASSESSMENT — ENCOUNTER SYMPTOMS
TROUBLE SWALLOWING: 1
SORE THROAT: 1

## 2021-07-30 ASSESSMENT — PAIN DESCRIPTION - PAIN TYPE
TYPE: ACUTE PAIN
TYPE: ACUTE PAIN

## 2021-07-30 NOTE — CARE COORDINATION
Social work / Discharge Planning:       Social work met with patient for initial assessment. Patient attempted to answer some questions but had a delay in responding. Social work left a voicemail for wife requesting return call to complete assessment. RN updated social work that she spoke to the wife earlier today and the patient wears 6 liters of oxygen at night and prn at home. Patient was able to state that [facility] in [place] provides oxygen. RN also updated social work that patient failed swallow evaluation and has new ENT consult ordered. He uses a cane at home. AM PAC 18/24. Social work will follow.   Electronically signed by LUIZ Carnes on 7/30/2021 at 1:49 PM

## 2021-07-30 NOTE — PROGRESS NOTES
SPEECH/LANGUAGE PATHOLOGY  VIDEOFLUOROSCOPIC STUDY OF SWALLOWING (MBS)   and PLAN OF CARE    PATIENT NAME:  Davidson Gaines  (male)     MRN:  [id number]    :  1935  (81 y.o.)  STATUS:  Inpatient: Room -A    TODAY'S DATE:  2021  REFERRING PROVIDER:   Jessica Pitts MD   SPECIFIC PROVIDER ORDER: FL modified barium swallow with video  Date of order:  21  REASON FOR REFERRAL: assess for dysphagia; pt c/o new onset throat/neck pain and difficulty swallowing  EVALUATING THERAPIST: ADDIE Cohen      RESULTS:      DYSPHAGIA DIAGNOSIS:  Mild oral, severe pharyngeal phase dysphagia-aspiration observed with all consistencies presented    Unsure if pt is good historian due to hx of Alzheimer's per chart, however reports this pain in neck and difficulty swallowing is new and stated \"there is definitely something wrong in there\". If family confirms this is new, pt may benefit from an ENT consult. If etiology of severe dysphagia not determined by ENT, pt may benefit from a neurology consult. DIET RECOMMENDATIONS:   NPO: Recommend physician consideration for placement of alternative source for nutrition/hydration/medication administration (NG tube)  if in accordance with patient/family wishes until etiology of sudden severe pharyngeal dysphagia determined.      FEEDING RECOMMENDATIONS:    Assistance level:  Not applicable     Compensatory strategies recommended: Not applicable     Discussed recommendations with nursing and/or faxed report to referring provider: Yes    SPEECH THERAPY  PLAN OF CARE   The dysphagia POC is established based on physician order and dysphagia diagnosis    Skilled SLP intervention for dysphagia management on acute care 3-5 x per week until goals met, pt plateaus in function and/or discharged from hospital      Conditions Requiring Skilled Therapeutic Intervention for dysphagia:    Reduced oral control of bolus   Reduced pharyngeal clearing of the bolus  reduced laryngeal PATIENT REPORT/COMPLAINT: see above    PRIOR LEVEL OF SWALLOW FUNCTION:    Past History of Dysphagia?:  none reported    Diet during hospital admission: NPO    PROCEDURE:  Consistencies Administered During the Evaluation   Liquids: nectar thick liquid and honey thick liquid   Solids:  pureed foods      Method of Intake:   cup, spoon  Self fed, Fed by clinician      Position:   Seated, upright, Lateral plane    INSTRUMENTAL ASSESSMENT:    ORAL PREPARATION PHASE:    Within functional limits    ORAL PHASE:   Impaired AP Movement    PHARYNGEAL PHASE:     ONSET TIME       Delayed initiation of the pharyngeal swallow was noted with swallow reflex triggered at the level of the vallecula        PHARYNGEAL RESIDUALS        Vallecula/Pharyngeal Wall           Reduced tongue base retraction resulting in residuals in vallecula and/or posterior pharyngeal wall for all consistencies administered which did not clear with cued multiple swallow       Pyriform Sinuses      Residuals in the pyriform sinuses were noted due to pharyngeal weakness for all consistencies administered  which  mostly cleared with cued multiple swallow      LARYNGEAL PENETRATION   Laryngeal penetration occurred prior to aspiration. Further details under aspiration section. ASPIRATION  Aspiration occurred BEFORE the swallow for nectar consistency liquid due to delayed pharyngeal swallow . Aspiration was severe and occurred consistently . a spontaneous cough was noted  Aspiration occurred DURING the swallow for honey consistency liquid due to  inadequate laryngeal closure . Aspiration was moderate and occurred consistently . a spontaneous cough was noted  Aspiration occurred AFTER the swallow for pureed foods due to pharyngeal residuals . Aspiration was moderate and occurred inconsistently .   spontaneous throat clearing was noted    PENETRATION-ASPIRATION SCALE (PAS):  THIN item not administered  MILDLY THICK 7 = Material enters the airway, passes below the vocal folds, and is not ejected from the trachea despite effort   MODERATELY THICK 7 = Material enters the airway, passes below the vocal folds, and is not ejected from the trachea despite effort   PUREE 7 = Material enters the airway, passes below the vocal folds, and is not ejected from the trachea despite effort   HARD SOLID item not administered       COMPENSATORY STRATEGIES    Compensatory strategies that were beneficial included Multiple swallow and Compensatory strategies that were not beneficial included Small bites/sips and Throat clear      STRUCTURAL/FUNCTIONAL ANOMALIES   No structural/functional anomalies were noted    CERVICAL ESOPHAGEAL STAGE :     Was not assessed          ___________    Cognition:   Confusion noted    Oral Peripheral Examination   Generalized oral weakness     Parameters of Speech Production  Respiration:  Adequate for speech production  Quality:   Within functional limits  Intensity: Quiet    Pain: 6/10 - Pain location: neck    EDUCATION:   The Speech Language Pathologist (SLP) completed education regarding results of evaluation and that intervention is warranted at this time. Learner: Patient  Education: Reviewed results and recommendations of this evaluation  Evaluation of Education:  Needs further instruction    This plan may be re-evaluated and revised as warranted. Evaluation Time includes thorough review of current medical information, gathering information on past medical history/social history and prior level of function, completion of standardized testing/informal observation of tasks, assessment of data and education on plan of care and goals. [x]The admitting diagnosis and active problem list, have been reviewed prior to initiation of this evaluation. INTERVENTION    Pt/family educated on above results and plan of care.  Pt/family trained on compensatory strategies for safe swallow and signs and symptoms of aspiration (throat clearing, coughing/choking, wet vocal quality. , etc.). Handouts provided as warranted. Pt/family encouraged to engage in question/answer session. All questions answered and pt/family verbalized understanding of above. CPT Code: 08181  dysphagia study, 59556 dysphagia therapy    ACTIVE PROBLEM LIST:   Patient Active Problem List   Diagnosis    Acute and chronic respiratory failure with hypoxia (HCC)    COPD (chronic obstructive pulmonary disease) ([facility] [address]    Diabetes mellitus type 2, uncontrolled (HCC)    History of DVT (deep vein thrombosis)    Obesity (BMI 30-39. 9)    Chronic respiratory failure with hypoxia (HCC)    Alzheimer's dementia with behavioral disturbance (HCC)    Ureterolithiasis    DEMI (acute kidney injury) (HCC)    CKD (chronic kidney disease) stage 3, GFR 30-59 ml/min (HCC)    Chronic anticoagulation    Fever    Hyperkalemia       Brendan SEBASTIAN CCC/SLP [id number]  Speech-Language Pathologist

## 2021-07-30 NOTE — PROGRESS NOTES
Pt extremely confused, combative and refusing safetyy instructions at this time. Bed alarm on, SR up, telesitter in room and on.

## 2021-07-30 NOTE — PROGRESS NOTES
Dr. Rajni Dickerson @ bedside. Pt currently refusing I&D procedure and scope. Family is letting pt, who is clearly confused, make his own decision @ this time. Dr. Rajni Dickerson educates family options in future. RN explains to pt, wife & daughter options for NGT or feeding tube. This RN verbalized to wife & daughter that this RN does not believe that pt is capable of making own decision @ this point. Wife & daughter agree. They will discuss overnight and let nursing staff know decision in am.    Left voicemail for Dilcia Oscar CNP re:  NPO status and not able to give PO meds. Also, pt has attempted to get OOB unassisted mult. Times this shift and has ripped out PIV for 2nd day in a row=possible need for anxiety meds for safety measure.

## 2021-07-30 NOTE — PROGRESS NOTES
[facility] Quality Flow/Interdisciplinary Rounds Progress Note        Quality Flow Rounds held on July 30, 2021    Disciplines Attending:  Bedside Nurse, ,  and Nursing Unit Leadership    Mely Jaramillo was admitted on 7/28/2021 11:15 PM    Anticipated Discharge Date:  Expected Discharge Date: 08/01/21    Disposition:    Miguel Score:  Miguel Scale Score: 18    Readmission Risk              Risk of Unplanned Readmission:  23           Discussed patient goal for the day, patient clinical progression, and barriers to discharge. The following Goal(s) of the Day/Commitment(s) have been identified: Monitor labs and vitals, for video swallow today, and discharge planning home.     Zulema Wilder RN  July 30, 2021

## 2021-07-30 NOTE — PROGRESS NOTES
Subjective: The patient is awake and alert. Resting in bed with no complaints  No acute events overnight. Denies chest pain, angina, SOB     Objective:    /64   Pulse 92   Temp 98.3 °F (36.8 °C) (Oral)   Resp 18   Ht 5' 6\" (1.676 m)   Wt 204 lb 11.2 oz (92.9 kg)   SpO2 92%   BMI 33.04 kg/m²     In: 1465 [I.V.:1465]  Out: 700   In: 1465   Out: 700 [Urine:700]    General appearance: NAD, conversant, fatigued-  HEENT: AT/NC, MMM  Neck: FROM, supple  Lungs: Clear to auscultation  CV: RRR, no MRGs  Vasc: Radial pulses 2+  Abdomen: Soft, non-tender; no masses or HSM  Extremities: No peripheral edema or digital cyanosis  Skin: no rash, lesions or ulcers  Psych: Alert and oriented to person, place and time  Neuro: Alert and interactive     Recent Labs     07/29/21  0115 07/30/21  0338   WBC 12.1* 8.9   HGB 12.6 11.3*   HCT 38.4 34.3*    116*       Recent Labs     07/29/21  0115 07/30/21  0338    138   K 5.6* 4.8  4.8   * 111*   CO2 19* 22   BUN 49* 41*   CREATININE 2.4* 2.0*   CALCIUM 8.5* 8.1*       Assessment:    Principal Problem:    DEMI (acute kidney injury) ([facility] [address]  Active Problems:    Diabetes mellitus type 2, uncontrolled (HCC)    Fever    Hyperkalemia  Resolved Problems:    * No resolved hospital problems. *      Plan:  80-year-old male with a past medical history of DM, hypertension, and COPD admitted to telemetry unit with     Acute kidney injury  -IV hydration  -Monitor labs - Creat improved from 2.5 -2.0 today  -Supplement O2 to keep saturation greater than 93%  -SLP & Video swallow pending     Diabetes mellitus  -Monitor labs  -ISS glucose control  -Hypoglycemia protocol initiated  -Discuss diet modification      Medication for other comorbidities continue as appropriate dose adjustment as necessary.     DVT Prophylaxis   PT/OT  Discharge planning - return home with wife      Aftab GORDY Bajwa - CNP  9:52 AM  7/30/2021     Indicates he still does not feel well  Underwent formal swallow evaluation-aspiration and penetration to thin nectar and pudding consistencies  Speech swallow therapy is recommended noted for ENT evaluation which has been placed  Await input  Continue to monitor renal function    I personally saw, examined and provided care for the patient. Radiographs, labs and medication list were reviewed by me independently. The case was discussed in detail and plans for care were established. Review of [address] [place] [address], documentation was conducted and revisions were made as appropriate directly by me. I agree with the above documented exam, problem list, and plan of care.      Thais Chamorro MD  4:39 PM  7/30/2021

## 2021-07-30 NOTE — CONSULTS
OTOLARYNGOLOGY  CONSULT NOTE  7/30/2021    Physician Consulted: Dr. Jake Hazel  Reason for Consult: Dysphagia, sore throat, failed video swallow      HPI  Oliverio Claire is a 81 y.o. male who ENT was consulted for evaluation of dysphagia. Patient has PMH of dementia, COPD, DM, HLD, HTN and tobacco abuse. Presented to the ED with two day history of sore throat and dysphagia. This has never happened to patient before. Wife endorses fevers at home. Denies shortness of breath at this time. Found to have DEMI. Patient is poor historian difficult to obtain history majority was obtained from spouse. Review of Systems   HENT: Positive for sore throat and trouble swallowing. All other systems reviewed and are negative. Past Medical History:   Diagnosis Date    [facility]ch[facility] obstructive [facility]) [facility]     Diabetes mellitus ([facility]     Hyperlipidemia     Hypertension        Past Surgical History:   Procedure Laterality Date    LITHOTRIPSY N/A 9/24/2019    CYSTOSCOPY RETROGRADE PYELOGRAM URETEROSCOPY J STENT LASER LITHOTRIPSY performed by Jose L Thornton MD at [facility] LITHOTRIPSY Left 11/26/2019    LEFT CYSTOSCOPY RETROGRADE PYELOGRAM URETEROSCOPY STENT INSERTION performed by Lali Han MD at [facility] OR       Medications Prior to Admission:    Prior to Admission medications    Medication Sig Start Date End Date Taking?  Authorizing Provider   allopurinol (ZYLOPRIM) 100 MG tablet Take 100 mg by mouth daily   Yes Historical Provider, MD   aspirin 81 MG tablet Take 81 mg by mouth daily   Yes Historical Provider, MD   tolterodine (DETROL LA) 4 MG extended release capsule Take 4 mg by mouth daily   Yes Historical Provider, MD   finasteride (PROSCAR) 5 MG tablet Take 5 mg by mouth daily   Yes Historical Provider, MD   tamsulosin (FLOMAX) 0.4 MG capsule Take 0.4 mg by mouth daily   Yes Historical Provider, MD   glimepiride (AMARYL) 4 MG tablet Take 4 mg by mouth every morning (before breakfast) Yes Historical Provider, MD   lansoprazole (PREVACID) 30 MG delayed release capsule Take 30 mg by mouth daily   Yes Historical Provider, MD   insulin glargine (LANTUS) 100 UNIT/ML injection vial Inject 32 Units into the skin nightly    Yes Historical Provider, MD   losartan (COZAAR) 25 MG tablet Take 25 mg by mouth daily   Yes Historical Provider, MD   guaiFENesin (MUCINEX) 600 MG extended release tablet Take 1,200 mg by mouth 2 times daily   Yes Historical Provider, MD   simvastatin (ZOCOR) 40 MG tablet Take 40 mg by mouth nightly   Yes Historical Provider, MD   HYDROcodone-acetaminophen (NORCO) 7.5-325 MG per tablet Take 1 tablet by mouth every 6 hours as needed for Pain. .   Yes Historical Provider, MD   cetirizine (ZYRTEC) 10 MG tablet Take 10 mg by mouth daily   Yes Historical Provider, MD   SITagliptin (JANUVIA) 100 MG tablet Take 100 mg by mouth daily   Yes Historical Provider, MD   warfarin (COUMADIN) 5 MG tablet Take by mouth 12/10/10   Historical Provider, MD   OXYGEN Inhale into the lungs Wears 8L NC @ HS only    Historical Provider, MD       Allergies   Allergen Reactions    Penicillin G     Penicillins Hives    Tetanus Toxoids Hives       No family history on file.     Social History     Tobacco Use    Smoking status: Former Smoker    Smokeless tobacco: Never Used   Vaping Use    Vaping Use: Never used   Substance Use Topics    Alcohol use: No    Drug use: No           PHYSICAL EXAM:    Vitals:    07/30/21 1600   BP: (!) 141/67   Pulse: 78   Resp: 18   Temp: 98 °F (36.7 °C)   SpO2: 92%       General Appearance:  Laying in bed, awake, alert, no apparent distress  Head/face:  NC/AT  Eyes: PERRL, EOMI  ENT: Bilateral external ears WNL, Nares patent, Septum midline, soft palate asymmetry with erythema and edema of the right soft palate bulging medially area is quite tender to palpation  Neck:Supple, no adenopathy  Lungs:  Non-labored, good respiratory effort, no stridor  Heart:  RR  Neuro: Facial nerve symmetric and intact. House Brackmann 1/6, bilaterally. LABS:  CBC  Recent Labs     07/30/21  0338   WBC 8.9   HGB 11.3*   HCT 34.3*   *       RADIOLOGY  CT HEAD WO CONTRAST    Result Date: 7/29/2021  EXAMINATION: CT OF THE HEAD WITHOUT CONTRAST  7/29/2021 1:07 am TECHNIQUE: CT of the head was performed without the administration of intravenous contrast. Dose modulation, iterative reconstruction, and/or weight based adjustment of the mA/kV was utilized to reduce the radiation dose to as low as reasonably achievable. COMPARISON: None. HISTORY: ORDERING SYSTEM PROVIDED HISTORY: alter  mental status TECHNOLOGIST PROVIDED HISTORY: Has a \"code stroke\" or \"stroke alert\" been called? ->No Reason for exam:->alter  mental status Decision Support Exception - unselect if not a suspected or confirmed emergency medical condition->Emergency Medical Condition (MA) FINDINGS: BRAIN/VENTRICLES: There is moderate generalized atrophy. There is mild patchy periventricular and subcortical white matter low attenuation that is nonspecific but most consistent with chronic small vessel ischemia. There is no evidence of acute hemorrhage, mass or extra-axial fluid collection. ORBITS: The visualized portion of the orbits demonstrate no acute abnormality. SINUSES: The visualized paranasal sinuses and mastoid air cells demonstrate no acute abnormality. SOFT TISSUES/SKULL:  No acute abnormality of the visualized skull or soft tissues. Artifact from dental hardware. No acute intracranial abnormality. Generalized atrophy and chronic small vessel ischemic white matter disease. CT CERVICAL SPINE WO CONTRAST    Result Date: 7/29/2021  EXAMINATION: CT OF THE CERVICAL SPINE WITHOUT CONTRAST 7/29/2021 1:07 am TECHNIQUE: CT of the cervical spine was performed without the administration of intravenous contrast. Multiplanar reformatted images are provided for review.  Dose modulation, iterative reconstruction, and/or weight based adjustment of the mA/kV was utilized to reduce the radiation dose to as low as reasonably achievable. COMPARISON: None. HISTORY: ORDERING SYSTEM PROVIDED HISTORY: pain TECHNOLOGIST PROVIDED HISTORY: Reason for exam:->pain Decision Support Exception - unselect if not a suspected or confirmed emergency medical condition->Emergency Medical Condition (MA) FINDINGS: BONES/ALIGNMENT: There is a mild degenerative anterolisthesis of C4 on C5 and a minimal degenerative anterolisthesis of C3 on C4. Vertebral body alignment is otherwise normal.  Cervical vertebral body heights are. There is no acute fracture or traumatic malalignment. DEGENERATIVE CHANGES: There are extensive multilevel degenerative changes. There is prominent pannus formation with calcification posterior to the odontoid process. There is a large central disc herniation at C3-C4 causing moderate to severe cervical cord flattening (axial image 30). Moderate to severe right and severe left nerve root canal stenoses at C3-C4. Broad-based mixed protrusion at C5-C6 causes moderate cord flattening. SOFT TISSUES: There is no prevertebral soft tissue swelling. Severe multilevel degenerative changes in the cervical spine with no acute fracture traumatic malalignment. There is a large central disc herniation at C3-C4 causing moderate to severe cervical cord flattening. XR CHEST 1 VIEW    Result Date: 7/29/2021  EXAMINATION: ONE XRAY VIEW OF THE CHEST 7/29/2021 12:21 am COMPARISON: 08/31/2008 HISTORY: ORDERING SYSTEM PROVIDED HISTORY: cough TECHNOLOGIST PROVIDED HISTORY: Reason for exam:->cough FINDINGS: The lungs are without acute focal process. There is no effusion or pneumothorax. Stable cardiomegaly. The osseous structures are without acute process. No acute process. Stable cardiomegaly. ASSESSMENT:  81 y.o. male with sore throat and dysphagia.  Suspect this is likely due to right sided peritonsillar abscess, however this has not been

## 2021-07-30 NOTE — PROGRESS NOTES
Occupational Therapy      Occupational Therapy referral received  Attempt this date - patient out of room - test

## 2021-07-31 LAB
ANION GAP SERPL CALCULATED.3IONS-SCNC: 14 MMOL/L (ref 7–16)
BUN BLDV-MCNC: 34 MG/DL (ref 6–23)
CALCIUM SERPL-MCNC: 8.5 MG/DL (ref 8.6–10.2)
CHLORIDE BLD-SCNC: 107 MMOL/L (ref 98–107)
CO2: 17 MMOL/L (ref 22–29)
CREAT SERPL-MCNC: 1.5 MG/DL (ref 0.7–1.2)
GFR AFRICAN AMERICAN: 54
GFR NON-AFRICAN AMERICAN: 44 ML/MIN/1.73
GLUCOSE BLD-MCNC: 244 MG/DL (ref 74–99)
HCT VFR BLD CALC: 39.5 % (ref 37–54)
HEMOGLOBIN: 13.2 G/DL (ref 12.5–16.5)
INR BLD: 2.4
MCH RBC QN AUTO: 30.6 PG (ref 26–35)
MCHC RBC AUTO-ENTMCNC: 33.4 % (ref 32–34.5)
MCV RBC AUTO: 91.4 FL (ref 80–99.9)
METER GLUCOSE: 171 MG/DL (ref 74–99)
METER GLUCOSE: 188 MG/DL (ref 74–99)
METER GLUCOSE: 203 MG/DL (ref 74–99)
METER GLUCOSE: 236 MG/DL (ref 74–99)
PDW BLD-RTO: 14.2 FL (ref 11.5–15)
PLATELET # BLD: 124 E9/L (ref 130–450)
PMV BLD AUTO: 10.9 FL (ref 7–12)
POTASSIUM SERPL-SCNC: 4.5 MMOL/L (ref 3.5–5)
PROTHROMBIN TIME: 27 SEC (ref 9.3–12.4)
RBC # BLD: 4.32 E12/L (ref 3.8–5.8)
SODIUM BLD-SCNC: 138 MMOL/L (ref 132–146)
URINE CULTURE, ROUTINE: NORMAL
WBC # BLD: 5.4 E9/L (ref 4.5–11.5)

## 2021-07-31 PROCEDURE — 2500000003 HC RX 250 WO HCPCS: Performed by: STUDENT IN AN ORGANIZED HEALTH CARE EDUCATION/TRAINING PROGRAM

## 2021-07-31 PROCEDURE — 2700000000 HC OXYGEN THERAPY PER DAY

## 2021-07-31 PROCEDURE — 99223 1ST HOSP IP/OBS HIGH 75: CPT | Performed by: INTERNAL MEDICINE

## 2021-07-31 PROCEDURE — 85027 COMPLETE CBC AUTOMATED: CPT

## 2021-07-31 PROCEDURE — 80048 BASIC METABOLIC PNL TOTAL CA: CPT

## 2021-07-31 PROCEDURE — 6360000002 HC RX W HCPCS: Performed by: PHYSICIAN ASSISTANT

## 2021-07-31 PROCEDURE — 82962 GLUCOSE BLOOD TEST: CPT

## 2021-07-31 PROCEDURE — 2580000003 HC RX 258: Performed by: PHYSICIAN ASSISTANT

## 2021-07-31 PROCEDURE — 6370000000 HC RX 637 (ALT 250 FOR IP): Performed by: PHYSICIAN ASSISTANT

## 2021-07-31 PROCEDURE — 85610 PROTHROMBIN TIME: CPT

## 2021-07-31 PROCEDURE — 6360000002 HC RX W HCPCS: Performed by: FAMILY MEDICINE

## 2021-07-31 PROCEDURE — 6360000002 HC RX W HCPCS: Performed by: STUDENT IN AN ORGANIZED HEALTH CARE EDUCATION/TRAINING PROGRAM

## 2021-07-31 PROCEDURE — 2060000000 HC ICU INTERMEDIATE R&B

## 2021-07-31 PROCEDURE — 36415 COLL VENOUS BLD VENIPUNCTURE: CPT

## 2021-07-31 PROCEDURE — 2580000003 HC RX 258: Performed by: INTERNAL MEDICINE

## 2021-07-31 RX ORDER — DEXAMETHASONE SODIUM PHOSPHATE 4 MG/ML
10 INJECTION, SOLUTION INTRA-ARTICULAR; INTRALESIONAL; INTRAMUSCULAR; INTRAVENOUS; SOFT TISSUE EVERY 8 HOURS
Status: DISCONTINUED | OUTPATIENT
Start: 2021-07-31 | End: 2021-08-01

## 2021-07-31 RX ORDER — CLINDAMYCIN PHOSPHATE 600 MG/50ML
600 INJECTION INTRAVENOUS EVERY 8 HOURS
Status: COMPLETED | OUTPATIENT
Start: 2021-07-31 | End: 2021-08-02

## 2021-07-31 RX ADMIN — DEXAMETHASONE SODIUM PHOSPHATE 10 MG: 4 INJECTION, SOLUTION INTRAMUSCULAR; INTRAVENOUS at 14:39

## 2021-07-31 RX ADMIN — SODIUM CHLORIDE, PRESERVATIVE FREE 10 ML: 5 INJECTION INTRAVENOUS at 09:34

## 2021-07-31 RX ADMIN — INSULIN LISPRO 1 UNITS: 100 INJECTION, SOLUTION INTRAVENOUS; SUBCUTANEOUS at 20:50

## 2021-07-31 RX ADMIN — DEXAMETHASONE SODIUM PHOSPHATE 10 MG: 4 INJECTION, SOLUTION INTRAMUSCULAR; INTRAVENOUS at 20:49

## 2021-07-31 RX ADMIN — CLINDAMYCIN PHOSPHATE 600 MG: 600 INJECTION, SOLUTION INTRAVENOUS at 14:38

## 2021-07-31 RX ADMIN — SODIUM CHLORIDE: 9 INJECTION, SOLUTION INTRAVENOUS at 20:56

## 2021-07-31 RX ADMIN — INSULIN LISPRO 2 UNITS: 100 INJECTION, SOLUTION INTRAVENOUS; SUBCUTANEOUS at 11:32

## 2021-07-31 RX ADMIN — ENOXAPARIN SODIUM 30 MG: 30 INJECTION SUBCUTANEOUS at 14:39

## 2021-07-31 RX ADMIN — DEXAMETHASONE SODIUM PHOSPHATE 10 MG: 4 INJECTION, SOLUTION INTRAMUSCULAR; INTRAVENOUS at 02:37

## 2021-07-31 RX ADMIN — CLINDAMYCIN PHOSPHATE 600 MG: 600 INJECTION, SOLUTION INTRAVENOUS at 20:50

## 2021-07-31 RX ADMIN — INSULIN LISPRO 4 UNITS: 100 INJECTION, SOLUTION INTRAVENOUS; SUBCUTANEOUS at 16:26

## 2021-07-31 RX ADMIN — INSULIN LISPRO 4 UNITS: 100 INJECTION, SOLUTION INTRAVENOUS; SUBCUTANEOUS at 06:19

## 2021-07-31 RX ADMIN — DEXAMETHASONE SODIUM PHOSPHATE 10 MG: 4 INJECTION, SOLUTION INTRAMUSCULAR; INTRAVENOUS at 09:34

## 2021-07-31 RX ADMIN — LORAZEPAM 0.5 MG: 2 INJECTION INTRAMUSCULAR; INTRAVENOUS at 00:54

## 2021-07-31 ASSESSMENT — PAIN SCALES - PAIN ASSESSMENT IN ADVANCED DEMENTIA (PAINAD)
CONSOLABILITY: 1
NEGVOCALIZATION: 1
CONSOLABILITY: 1
TOTALSCORE: 4
FACIALEXPRESSION: 1
NEGVOCALIZATION: 1
TOTALSCORE: 4
BODYLANGUAGE: 1
BREATHING: 0
FACIALEXPRESSION: 1
BREATHING: 0
BODYLANGUAGE: 1

## 2021-07-31 ASSESSMENT — PAIN SCALES - GENERAL
PAINLEVEL_OUTOF10: 0
PAINLEVEL_OUTOF10: 4

## 2021-07-31 NOTE — PROGRESS NOTES
Physical Therapy    Pt NA for Rx this AM, was up a lot last night, was combative with staff earlier today, is now resting confortably. Will follow on another date/time.   Dixon Ansari PTA [id number]

## 2021-07-31 NOTE — PROGRESS NOTES
Wife and daughter at bedside trying to decide on peg tube, spouse states she cannot care for patient at home especially if he requires tube feedings. Patient is incontinent of urine and stool. He has a  with him due to confusion.  Patient continues to remain NPO he failed his swallowing eval.  Joao MITCHELL

## 2021-07-31 NOTE — PROGRESS NOTES
Subjective: The patient is resting in bed, has sitter at bedside restraints discontinued  No acute events overnight. Denies chest pain, angina, SOB     Objective:    BP (!) 161/88   Pulse 76   Temp 98 °F (36.7 °C) (Axillary)   Resp 20   Ht 5' 6\" (1.676 m)   Wt 204 lb 11.2 oz (92.9 kg)   SpO2 95%   BMI 33.04 kg/m²     In: 250 [P.O.:240; I.V.:10]  Out: 200   In: 250   Out: 200 [Urine:200]    General appearance: NAD, conversant, fatigued-  HEENT: AT/NC, MMM  Neck: FROM, supple  Lungs: Clear to auscultation  CV: RRR, no MRGs  Vasc: Radial pulses 2+  Abdomen: Soft, non-tender; no masses or HSM  Extremities: No peripheral edema or digital cyanosis  Skin: no rash, lesions or ulcers  Psych: Alert and oriented to person, place and time  Neuro: Alert and interactive     Recent Labs     07/29/21 0115 07/30/21 0338 07/31/21  0439   WBC 12.1* 8.9 5.4   HGB 12.6 11.3* 13.2   HCT 38.4 34.3* 39.5    116* 124*       Recent Labs     07/29/21 0115 07/29/21 0115 07/30/21 0338 07/31/21  0439     --  138 138   K 5.6*   < > 4.8  4.8 4.5   *  --  111* 107   CO2 19*  --  22 17*   BUN 49*  --  41* 34*   CREATININE 2.4*  --  2.0* 1.5*   CALCIUM 8.5*  --  8.1* 8.5*    < > = values in this interval not displayed. Assessment:    Principal Problem:    DEMI (acute kidney injury) ([facility] [address]  Active Problems:    Diabetes mellitus type 2, uncontrolled ([facility] [address]    Fever    Hyperkalemia  Resolved Problems:    * No resolved hospital problems.  *      Plan:  50-year-old male with a past medical history of DM, hypertension, and COPD admitted to telemetry unit with     Acute kidney injury  -IV hydration  -Monitor labs - Creat improved from 2.5 -2.0 today  -Supplement O2 to keep saturation greater than 93%  -SLP & Video swallow pending - failed  -ENT consulted at the recommendation and patient refused testing patient was started on Cleocin 600 mg and decadron 10mg iv  -Cardiology consulted by  for medical clearance    Diabetes mellitus  -Monitor labs  -ISS glucose control  -Hypoglycemia protocol initiated  -Discuss diet modification     Patient became combative on 7/30/2021 and was placed in restraints. Restraints discontinued patient has sitter at bedside. Medication for other comorbidities continue as appropriate dose adjustment as necessary. DVT Prophylaxis   PT/OT  Discharge planning - return home with wife      Jean Carlos Albarran, APRN - CNP  1:14 PM  7/31/2021     Await family decision on PEG tube placement  Patient is extremely frail and appears to be was refusing any aggressive work-up  Palliative care evaluation  Family wishes for placement    I personally saw, examined and provided care for the patient. Radiographs, labs and medication list were reviewed by me independently. The case was discussed in detail and plans for care were established. Review of [address] [place] [address], documentation was conducted and revisions were made as appropriate directly by me. I agree with the above documented exam, problem list, and plan of care.      Steffi Cardona MD  5:44 PM  7/31/2021

## 2021-08-01 LAB
ANION GAP SERPL CALCULATED.3IONS-SCNC: 9 MMOL/L (ref 7–16)
BUN BLDV-MCNC: 40 MG/DL (ref 6–23)
CALCIUM SERPL-MCNC: 8.4 MG/DL (ref 8.6–10.2)
CHLORIDE BLD-SCNC: 109 MMOL/L (ref 98–107)
CO2: 20 MMOL/L (ref 22–29)
CREAT SERPL-MCNC: 1.4 MG/DL (ref 0.7–1.2)
GFR AFRICAN AMERICAN: 58
GFR NON-AFRICAN AMERICAN: 48 ML/MIN/1.73
GLUCOSE BLD-MCNC: 244 MG/DL (ref 74–99)
HCT VFR BLD CALC: 38.2 % (ref 37–54)
HEMOGLOBIN: 12.8 G/DL (ref 12.5–16.5)
INR BLD: 3.5
MCH RBC QN AUTO: 30.2 PG (ref 26–35)
MCHC RBC AUTO-ENTMCNC: 33.5 % (ref 32–34.5)
MCV RBC AUTO: 90.1 FL (ref 80–99.9)
METER GLUCOSE: 196 MG/DL (ref 74–99)
METER GLUCOSE: 197 MG/DL (ref 74–99)
METER GLUCOSE: 203 MG/DL (ref 74–99)
METER GLUCOSE: 251 MG/DL (ref 74–99)
PDW BLD-RTO: 14.2 FL (ref 11.5–15)
PLATELET # BLD: 152 E9/L (ref 130–450)
PMV BLD AUTO: 10.6 FL (ref 7–12)
POTASSIUM SERPL-SCNC: 4.8 MMOL/L (ref 3.5–5)
PROTHROMBIN TIME: 39.1 SEC (ref 9.3–12.4)
RBC # BLD: 4.24 E12/L (ref 3.8–5.8)
SODIUM BLD-SCNC: 138 MMOL/L (ref 132–146)
WBC # BLD: 8.8 E9/L (ref 4.5–11.5)

## 2021-08-01 PROCEDURE — 97530 THERAPEUTIC ACTIVITIES: CPT

## 2021-08-01 PROCEDURE — 2500000003 HC RX 250 WO HCPCS: Performed by: STUDENT IN AN ORGANIZED HEALTH CARE EDUCATION/TRAINING PROGRAM

## 2021-08-01 PROCEDURE — 6360000002 HC RX W HCPCS: Performed by: PHYSICIAN ASSISTANT

## 2021-08-01 PROCEDURE — 2580000003 HC RX 258: Performed by: PHYSICIAN ASSISTANT

## 2021-08-01 PROCEDURE — 85610 PROTHROMBIN TIME: CPT

## 2021-08-01 PROCEDURE — 6360000002 HC RX W HCPCS: Performed by: INTERNAL MEDICINE

## 2021-08-01 PROCEDURE — 82962 GLUCOSE BLOOD TEST: CPT

## 2021-08-01 PROCEDURE — 85027 COMPLETE CBC AUTOMATED: CPT

## 2021-08-01 PROCEDURE — 36415 COLL VENOUS BLD VENIPUNCTURE: CPT

## 2021-08-01 PROCEDURE — 80048 BASIC METABOLIC PNL TOTAL CA: CPT

## 2021-08-01 PROCEDURE — 6360000002 HC RX W HCPCS: Performed by: FAMILY MEDICINE

## 2021-08-01 PROCEDURE — 2700000000 HC OXYGEN THERAPY PER DAY

## 2021-08-01 PROCEDURE — 6370000000 HC RX 637 (ALT 250 FOR IP): Performed by: PHYSICIAN ASSISTANT

## 2021-08-01 PROCEDURE — 2060000000 HC ICU INTERMEDIATE R&B

## 2021-08-01 PROCEDURE — 6360000002 HC RX W HCPCS: Performed by: STUDENT IN AN ORGANIZED HEALTH CARE EDUCATION/TRAINING PROGRAM

## 2021-08-01 RX ORDER — HALOPERIDOL 5 MG/ML
2 INJECTION INTRAMUSCULAR EVERY 6 HOURS PRN
Status: DISCONTINUED | OUTPATIENT
Start: 2021-08-01 | End: 2021-08-03 | Stop reason: HOSPADM

## 2021-08-01 RX ADMIN — INSULIN LISPRO 6 UNITS: 100 INJECTION, SOLUTION INTRAVENOUS; SUBCUTANEOUS at 06:20

## 2021-08-01 RX ADMIN — SODIUM CHLORIDE, PRESERVATIVE FREE 10 ML: 5 INJECTION INTRAVENOUS at 21:47

## 2021-08-01 RX ADMIN — LORAZEPAM 0.5 MG: 2 INJECTION INTRAMUSCULAR; INTRAVENOUS at 16:08

## 2021-08-01 RX ADMIN — INSULIN LISPRO 2 UNITS: 100 INJECTION, SOLUTION INTRAVENOUS; SUBCUTANEOUS at 11:35

## 2021-08-01 RX ADMIN — CLINDAMYCIN PHOSPHATE 600 MG: 600 INJECTION, SOLUTION INTRAVENOUS at 04:50

## 2021-08-01 RX ADMIN — ENOXAPARIN SODIUM 30 MG: 30 INJECTION SUBCUTANEOUS at 08:41

## 2021-08-01 RX ADMIN — CLINDAMYCIN PHOSPHATE 600 MG: 600 INJECTION, SOLUTION INTRAVENOUS at 14:55

## 2021-08-01 RX ADMIN — INSULIN LISPRO 2 UNITS: 100 INJECTION, SOLUTION INTRAVENOUS; SUBCUTANEOUS at 16:28

## 2021-08-01 RX ADMIN — DEXAMETHASONE SODIUM PHOSPHATE 10 MG: 4 INJECTION, SOLUTION INTRAMUSCULAR; INTRAVENOUS at 04:49

## 2021-08-01 RX ADMIN — INSULIN LISPRO 2 UNITS: 100 INJECTION, SOLUTION INTRAVENOUS; SUBCUTANEOUS at 21:54

## 2021-08-01 RX ADMIN — CLINDAMYCIN PHOSPHATE 600 MG: 600 INJECTION, SOLUTION INTRAVENOUS at 21:47

## 2021-08-01 RX ADMIN — HALOPERIDOL LACTATE 2 MG: 5 INJECTION, SOLUTION INTRAMUSCULAR at 18:02

## 2021-08-01 ASSESSMENT — PAIN SCALES - PAIN ASSESSMENT IN ADVANCED DEMENTIA (PAINAD)
BREATHING: 0
CONSOLABILITY: 0
TOTALSCORE: 0
BODYLANGUAGE: 0
FACIALEXPRESSION: 0
NEGVOCALIZATION: 0

## 2021-08-01 ASSESSMENT — PAIN SCALES - GENERAL
PAINLEVEL_OUTOF10: 0
PAINLEVEL_OUTOF10: 0

## 2021-08-01 ASSESSMENT — PAIN SCALES - WONG BAKER
WONGBAKER_NUMERICALRESPONSE: 0
WONGBAKER_NUMERICALRESPONSE: 0

## 2021-08-01 NOTE — PROGRESS NOTES
Physical Therapy    Facility/Department: [facility] MED SURG  Treatment note    NAME: Hiwot Moody  : 1935  MRN: [id number]    Date of Service: 2021      Attending Provider:  Damari Castro MD    Evaluating PT:  Holly Ruelas P.T. Room #:  05/05-A  Diagnosis:  General weakness [R53.1]  Pertinent PMHx/PSHx:  Alzheimer's Disease  Precautions:  Falls, bed/chair alarm    SUBJECTIVE:    Pt lives with his wife in a 1 story home with no stairs. Pt ambulated with SPC PTA. OBJECTIVE:   Initial Evaluation  Date: 21 Treatment  21 Short Term/ Long Term   Goals   Was pt agreeable to Eval/treatment? yes yes    Does pt have pain? C/o sore throat No complaints    Bed Mobility  Rolling: supervision  Supine to sit: supervision  Sit to supine: NA  Scooting: supervision Rolling: SBA  Supine to sit: SBA  Sit to supine: Mod A LE support  Scooting: SBA seated to EOB Independent   Transfers Sit to stand: SBA  Stand to sit: SBA  Stand pivot: SBA Sit to stand: Min A  Stand to sit: Min A supervision   Ambulation   15+40 feet with ww SBA 50 feet x 2 using [employer] for support Mod A for balance. See comments 200 feet with AAD supervision   Stair negotiation: ascended and descended NA NA NA   AM-PAC 6 Clicks 95/01 73/58        Pt is alert. , confused to place and time, with difficulty following instruction  Balance: poor dynamic using [employer] for support    Pt performed therapeutic exercise of the following: NT    Patient education  Pt was educated on UE usage to assist with transfers, gait promoting posture and staying within [employer] base of support    Patient response to education:   Pt verbalized understanding Pt demonstrated skill Pt requires further education in this area   no With much prompt yes     ASSESSMENT:   Comments: Nurse ok with Rx. Pt found in bed, Family present. Pt agreed to rx after much explanation about getting OOB and walking. Pt assisted to EOB, sat EOB CGA for balance.  Gait performed to the hallway, yuli very slow, rigid, inconsistent and laboring. Pt required continued prompt to maintain upright posture and stay within the [employer] base of support. Pt very unsteady throughout, required constant hands on assist for balance and safety. Pt unsafe to gait or transfer alone presently. Pt on 6 L 02 NC all activity, saturation 92% after activity. Treatment: Pt practiced and was instructed in the following treatment: transfer safety, gait/WW mechanics    Pt was left in bed with call light in reach. Chair/bed alarm: bed alarm active    Time in 1342   Time out 1406   Total Treatment Time 24 minutes   CPT codes:     Therapeutic activities 09138 24 minutes   Therapeutic exercises 58958 0 minutes       Pt is showing declining progress toward established Physical Therapy goals, required increased assist for balance and safety. .  Continue with physical therapy current plan of care.     Rama Hensley PTA   License Number: [id number]

## 2021-08-01 NOTE — PLAN OF CARE
Problem: Falls - Risk of:  Goal: Will remain free from falls  Description: Will remain free from falls  Outcome: Ongoing  Goal: Absence of physical injury  Description: Absence of physical injury  Outcome: Ongoing     Problem: Skin Integrity:  Goal: Will show no infection signs and symptoms  Description: Will show no infection signs and symptoms  Outcome: Ongoing  Goal: Absence of new skin breakdown  Description: Absence of new skin breakdown  Outcome: Ongoing  Goal: Risk for impaired skin integrity will decrease  Description: Risk for impaired skin integrity will decrease  Outcome: Ongoing     Problem: Mental Status - Impaired:  Goal: Mental status will improve  Description: Mental status will improve  Outcome: Ongoing     Problem: Cardiac:  Goal: Hemodynamic stability will improve  Description: Hemodynamic stability will improve  Outcome: Ongoing

## 2021-08-01 NOTE — PROGRESS NOTES
Subjective: The patient is resting in bed, has TSM  Patient calm and relaxed this am  No acute events overnight. Denies chest pain, angina, SOB     Objective:    /73   Pulse 68   Temp 98 °F (36.7 °C) (Axillary)   Resp 18   Ht 5' 6\" (1.676 m)   Wt 204 lb (92.5 kg)   SpO2 96%   BMI 32.93 kg/m²     In: -   Out: 150   In: -   Out: 150 [Urine:150]    General appearance: NAD, conversant, fatigued-  HEENT: AT/NC, MMM  Neck: FROM, supple  Lungs: Clear to auscultation  CV: RRR, no MRGs  Vasc: Radial pulses 2+  Abdomen: Soft, non-tender; no masses or HSM  Extremities: No peripheral edema or digital cyanosis  Skin: no rash, lesions or ulcers  Psych: Alert and oriented to person, place and time  Neuro: Alert and interactive     Recent Labs     07/30/21  0338 07/31/21  0439 08/01/21  0506   WBC 8.9 5.4 8.8   HGB 11.3* 13.2 12.8   HCT 34.3* 39.5 38.2   * 124* 152       Recent Labs     07/30/21  0338 07/31/21  0439 08/01/21  0506    138 138   K 4.8  4.8 4.5 4.8   * 107 109*   CO2 22 17* 20*   BUN 41* 34* 40*   CREATININE 2.0* 1.5* 1.4*   CALCIUM 8.1* 8.5* 8.4*       Assessment:    Principal Problem:    DEMI (acute kidney injury) ([facility])  Active Problems:    Diabetes mellitus type 2, uncontrolled (HCC)    Fever    Hyperkalemia  Resolved Problems:    * No resolved hospital problems.  *      Plan:  52-year-old male with a past medical history of DM, hypertension, and COPD admitted to telemetry unit with     Acute kidney injury  -IV hydration  -Monitor labs - Creat improved from 2.5 -2.0 today  -Supplement O2 to keep saturation greater than 93%  -SLP & Video swallow pending - failed  -ENT consulted at the recommendation and patient refused testing patient was started on Cleocin 600 mg and decadron 10mg iv  -Cardiology consulted by  for medical clearance      Diabetes mellitus  -Monitor labs  -ISS glucose control  -Hypoglycemia protocol initiated  -Discuss diet modification     Patient became combative on 7/30/2021 and was placed in restraints. Restraints discontinued patient has sitter at bedside. Sitter discontinued and patient has a TSM in [facility] with wife about patient unable to make his own decisions and peg placement. She states that she will be in today and I will meet with them when they arrive to discuss options. Palliative care consulted. Medication for other comorbidities continue as appropriate dose adjustment as necessary. DVT Prophylaxis   PT/OT  Discharge planning - return home with wife versus placement      Zigmund Favre, APRN - CNP  10:24 AM  8/1/2021     Yesterday when I discussed with daughter, she wanted to consider placement for him  Hold Coumadin  Seroquel for agitation  If family not interested in PEG tube, will plan discharge tomorrow  Currently requiring sitter at bedside to redirect    I personally saw, examined and provided care for the patient. Radiographs, labs and medication list were reviewed by me independently. The case was discussed in detail and plans for care were established. Review of [address] [place] [address], documentation was conducted and revisions were made as appropriate directly by me. I agree with the above documented exam, problem list, and plan of care.      Alena Gitelman, MD  5:47 PM  8/1/2021

## 2021-08-01 NOTE — PROGRESS NOTES
Medicated with ativan 0.5 mg x 1 with no effect remain restless pulling off clothes will not wear heart monitor, Haldol given as ordered IM 2 mg at 1800, continues to be restless, pulled nurses hair put fist in nurse and aids face, becoming aggressive. Attempting to pull IV out, covered with coban, did remove the ACE wrap from IV site, attempts to reorient. Decreased stimuli, blinds closed, lights dimmed, bed alarm on and tele sitter in place. Door open frequent checks by nursing staff.   Kellen Chisholm RN

## 2021-08-01 NOTE — PROGRESS NOTES
Call placed to [facility] [employer], family at bedside and want to meet with her  RMucci RN

## 2021-08-01 NOTE — PROGRESS NOTES
Family waited to speak with the physician and NP until  then had to leave for home. Patient in bed, call light in reach, bed alarm on.   Funmi Velasquez RN

## 2021-08-01 NOTE — CARE COORDINATION
Pt refused scope per ENT-suspect peritonsillar abscess as per note- failed swallow-NPO. Continues on iv Cleocin, iv Decadron. Awaiting updated PT/OT evals when able-pt was combative 7/31 - last am-pac 18 on 7/29. Hx dementia. From home w/ wife PTA. Requiring O2 [address] which is pt's home O2 baseline provided by [employer].  Will follow Chanda Lopez RN case manager

## 2021-08-02 LAB
ANION GAP SERPL CALCULATED.3IONS-SCNC: 9 MMOL/L (ref 7–16)
BUN BLDV-MCNC: 45 MG/DL (ref 6–23)
CALCIUM SERPL-MCNC: 8 MG/DL (ref 8.6–10.2)
CHLORIDE BLD-SCNC: 112 MMOL/L (ref 98–107)
CO2: 20 MMOL/L (ref 22–29)
CREAT SERPL-MCNC: 1.4 MG/DL (ref 0.7–1.2)
GFR AFRICAN AMERICAN: 58
GFR NON-AFRICAN AMERICAN: 48 ML/MIN/1.73
GLUCOSE BLD-MCNC: 236 MG/DL (ref 74–99)
HCT VFR BLD CALC: 37.1 % (ref 37–54)
HEMOGLOBIN: 12.5 G/DL (ref 12.5–16.5)
INR BLD: 5.9
MCH RBC QN AUTO: 30 PG (ref 26–35)
MCHC RBC AUTO-ENTMCNC: 33.7 % (ref 32–34.5)
MCV RBC AUTO: 89 FL (ref 80–99.9)
METER GLUCOSE: 141 MG/DL (ref 74–99)
METER GLUCOSE: 164 MG/DL (ref 74–99)
METER GLUCOSE: 166 MG/DL (ref 74–99)
PDW BLD-RTO: 14.4 FL (ref 11.5–15)
PLATELET # BLD: 149 E9/L (ref 130–450)
PMV BLD AUTO: 10 FL (ref 7–12)
POTASSIUM SERPL-SCNC: 4 MMOL/L (ref 3.5–5)
PROTHROMBIN TIME: 64.9 SEC (ref 9.3–12.4)
RBC # BLD: 4.17 E12/L (ref 3.8–5.8)
SODIUM BLD-SCNC: 141 MMOL/L (ref 132–146)
WBC # BLD: 8.7 E9/L (ref 4.5–11.5)

## 2021-08-02 PROCEDURE — 99223 1ST HOSP IP/OBS HIGH 75: CPT | Performed by: NURSE PRACTITIONER

## 2021-08-02 PROCEDURE — 6370000000 HC RX 637 (ALT 250 FOR IP): Performed by: PHYSICIAN ASSISTANT

## 2021-08-02 PROCEDURE — 2580000003 HC RX 258: Performed by: INTERNAL MEDICINE

## 2021-08-02 PROCEDURE — 36415 COLL VENOUS BLD VENIPUNCTURE: CPT

## 2021-08-02 PROCEDURE — 92526 ORAL FUNCTION THERAPY: CPT | Performed by: SPEECH-LANGUAGE PATHOLOGIST

## 2021-08-02 PROCEDURE — 85610 PROTHROMBIN TIME: CPT

## 2021-08-02 PROCEDURE — 80048 BASIC METABOLIC PNL TOTAL CA: CPT

## 2021-08-02 PROCEDURE — 6360000002 HC RX W HCPCS: Performed by: INTERNAL MEDICINE

## 2021-08-02 PROCEDURE — 85027 COMPLETE CBC AUTOMATED: CPT

## 2021-08-02 PROCEDURE — 2060000000 HC ICU INTERMEDIATE R&B

## 2021-08-02 PROCEDURE — 2700000000 HC OXYGEN THERAPY PER DAY

## 2021-08-02 PROCEDURE — 2500000003 HC RX 250 WO HCPCS: Performed by: STUDENT IN AN ORGANIZED HEALTH CARE EDUCATION/TRAINING PROGRAM

## 2021-08-02 PROCEDURE — 82962 GLUCOSE BLOOD TEST: CPT

## 2021-08-02 RX ADMIN — SODIUM CHLORIDE: 9 INJECTION, SOLUTION INTRAVENOUS at 17:44

## 2021-08-02 RX ADMIN — PHYTONADIONE 2 MG: 10 INJECTION, EMULSION INTRAMUSCULAR; INTRAVENOUS; SUBCUTANEOUS at 10:28

## 2021-08-02 RX ADMIN — INSULIN LISPRO 4 UNITS: 100 INJECTION, SOLUTION INTRAVENOUS; SUBCUTANEOUS at 06:38

## 2021-08-02 RX ADMIN — CLINDAMYCIN PHOSPHATE 600 MG: 600 INJECTION, SOLUTION INTRAVENOUS at 12:12

## 2021-08-02 RX ADMIN — SODIUM CHLORIDE: 9 INJECTION, SOLUTION INTRAVENOUS at 02:16

## 2021-08-02 RX ADMIN — CLINDAMYCIN PHOSPHATE 600 MG: 600 INJECTION, SOLUTION INTRAVENOUS at 04:30

## 2021-08-02 ASSESSMENT — PAIN SCALES - PAIN ASSESSMENT IN ADVANCED DEMENTIA (PAINAD)
TOTALSCORE: 0
CONSOLABILITY: 0
BREATHING: 0
NEGVOCALIZATION: 0
FACIALEXPRESSION: 0
BODYLANGUAGE: 0

## 2021-08-02 ASSESSMENT — PAIN SCALES - GENERAL
PAINLEVEL_OUTOF10: 0
PAINLEVEL_OUTOF10: 0

## 2021-08-02 ASSESSMENT — PAIN SCALES - WONG BAKER: WONGBAKER_NUMERICALRESPONSE: 0

## 2021-08-02 NOTE — CONSULTS
Palliative Care Department  [phone]  Palliative Care Initial Consult  Provider GORDY Flaherty CNP      PATIENT: Sarah Choudhury  : 1935  MRN: [id number]  ADMISSION DATE: 2021 11:15 PM  Referring Provider: Dr. Maxwell Yang was consulted on hospital day 4 for assistance with Goals of care    HPI:     Lam Lanier is a 81 y.o. y/o male with a history of Diabetes, hypertension, Alzheimer's disease, cervical disc disease, kidney stones, bladder cancer, and COPD who presented to [facility]) on 2021 with altered mental status. He was found to have a fever of unknown cause and an acute kidney injury. Had a swallow evaluation found to have severe dysphagia. Patient also complained of neck and throat pain, ENT suspects a peritonsillar abscess. Cannot get CT neck with contrast to confirm because of acute kidney injury. He was started on antibiotics and steroids. Family is deciding on having a PEG tube placed. ASSESSMENT/PLAN:     Pertinent Hospital Diagnoses      Altered Mental Status    Dysphagia   Peritonsillar swelling   Acute Kidney Injury    Palliative Care Encounter / Counseling Regarding Goals of Care  Please see detailed goals of care discussion as below   At this time, Sarah Choudhury, Does Not have capacity for medical decision-making. Capacity is time limited and situation/question specific   During encounter Kelli Loaiza was surrogate medical decision-maker   Outcome of goals of care meeting: Met with the patient's wife and daughter outside the patient's room. We spoke about the patient's goals moving forward. With his advancing Alzheimer's disease they're concerned that they will not be able to continue taking care of him at home. They worry about the patient's quality of life and happiness if a PEG tube was placed and he was placed in a nursing home. We did discuss hopsice if they decide to not have a PEG tube placed.  Patient had expressed prior to his Alzheimer's diagnosis that he does not want resuscitation. Family stated that he is a DNR CCA/ no intubation.  Code status Limited DNRCCA/ DNI   Advanced Directives: no POA or living will in epic   Surrogate/Legal NOK:  evangelista Gilmore [phone] (wife)  o Carla Leonardo [phone] (daughter)    Spiritual assessment: no spiritual distress identified  Bereavement and grief: to be determined  Referrals to: none today    Thank you for the opportunity to participate in the care of Vito Martinez. Jessee Ramos, APRN - CNP  Palliative Medicine     SUBJECTIVE:     Details of Conversation: Gopal Null was sleeping, he was given Haldol overnight. I met with the patient's wife and daughter outside of the patient's room. He lives at home with his wife Johnny Ravi who is the patient's caregiver. He has had advancing Alzheimer's disease and she is unsure if she can continue taking care of him at home. Johnny Ravi has to direct Gopal Null to do his ADLs (ex: eating, bathing). He does become agressive and agitated when he is unhappy. They are concerned that if he is sent to a nursing home and has a PEG tube placed he will be combative, have to be restrained, and that he will pull out the PEG tube. They believe his quality of life will be poor and do not believe it would be in his best interest.  We did discuss hospice as an alternative option if they decide against placing the PEG tube. We also discussed repeating the swallow evaluation when Gopal Null is more alert since there has been an improvement in the swelling. We spoke about the patient's code status and wishes. His wife stated that he told them prior to his dementia that he does not want resuscitation or life support. His wife and daughter are in agreement that he is a DNRCCA/ DNI. Code status was changed.      Prognosis: Guarded    OBJECTIVE:     /64   Pulse 58   Temp 98 °F (36.7 °C) (Axillary)   Resp 15   Ht 5' 6\" (1.676 m)   Wt 198 lb 6.4 oz (90 kg)   SpO2 (!) 88%   BMI 32.02 kg/m²     Physical Examination:  Gen: Somnolent  HEENT: normocephalic, atraumatic,  Neck: trachea midline, no JVD  Lungs: respirations easy and not labored,   Heart: regular rate and rhythm, distant heart tones,   Abdomen: normoactive bowel sounds, soft, non-tender  Extremities: no clubbing, cyanosis or edema    Skin: warm, dry without rashes, lesions, bruising  Neuro: Somnolent    Objective data reviewed: labs, images, records, medication use, vitals and chart    Time/Communication  Greater than 50% of time spent, total 70 minutes in counseling and coordination of care at the bedside regarding goals of care. Thank you for allowing Palliative Medicine to participate in the care of Tuyet Madrigal. Note: This report was completed using computerMy 1% voiced recognition software. Every effort has been made to ensure accuracy; however, inadvertent computerized transcription errors may be present.

## 2021-08-02 NOTE — PROGRESS NOTES
Subjective: The patient is resting in bed, has TSM  Patient calm and relaxed this am  No acute events overnight. Denies chest pain, angina, SOB     Objective:    /64   Pulse 58   Temp 98 °F (36.7 °C) (Axillary)   Resp 15   Ht 5' 6\" (1.676 m)   Wt 198 lb 6.4 oz (90 kg)   SpO2 (!) 88%   BMI 32.02 kg/m²     No intake/output data recorded. No intake/output data recorded. General appearance: NAD, conversant, fatigue  HEENT: AT/NC, MMM  Neck: FROM, supple  Lungs: Clear to auscultation  CV: RRR, no MRGs  Vasc: Radial pulses 2+  Abdomen: Soft, non-tender; no masses or HSM  Extremities: No peripheral edema or digital cyanosis  Skin: no rash, lesions or ulcers  Psych: Alert and oriented to person, place and time  Neuro: Alert and interactive     Recent Labs     07/31/21  0439 08/01/21  0506 08/02/21  0540   WBC 5.4 8.8 8.7   HGB 13.2 12.8 12.5   HCT 39.5 38.2 37.1   * 152 149       Recent Labs     07/31/21  0439 08/01/21  0506 08/02/21  0540    138 141   K 4.5 4.8 4.0    109* 112*   CO2 17* 20* 20*   BUN 34* 40* 45*   CREATININE 1.5* 1.4* 1.4*   CALCIUM 8.5* 8.4* 8.0*       Assessment:    Principal Problem:    DEMI (acute kidney injury) ([facility] [address]  Active Problems:    Diabetes mellitus type 2, uncontrolled (HCC)    Fever    Hyperkalemia  Resolved Problems:    * No resolved hospital problems.  *      Plan:  81-year-old male with a past medical history of DM, hypertension, and COPD admitted to telemetry unit with     Acute kidney injury  -IV hydration  -Monitor labs - Creat improved from 2.5 -2.0 today  -Supplement O2 to keep saturation greater than 93%  -SLP & Video swallow pending - failed  -ENT consulted at the recommendation and patient refused testing patient was started on Cleocin 600 mg and decadron 10mg iv  -Cardiology consulted by  for medical clearance      Diabetes mellitus  -Monitor labs  -ISS glucose control  -Hypoglycemia protocol initiated  -Discuss diet modification     Patient became combative on 7/30/2021 and was placed in restraints. Restraints discontinued patient has sitter at bedside. Sitter discontinued and patient has a TSM in room    Palliative care consulted. Hospice consulted     Discussion with wife and daughter, case mangement and social work present also. Family has decided to speak with hospice and not pursue peg placement. Medication for other comorbidities continue as appropriate dose adjustment as necessary. DVT Prophylaxis   PT/OT  Discharge planning - return home with wife versus placement      GORDY Guerrero - CNP  12:44 PM  8/2/2021     I personally saw, examined and provided care for the patient. Radiographs, labs and medication list were reviewed by me independently. The case was discussed in detail and plans for care were established. Review of [facility], documentation was conducted and revisions were made as appropriate directly by me. I agree with the above documented exam, problem list, and plan of care. Rancho Moss MD  4:44 PM  8/2/2021    Discussed with family at bedside today  Plan will be home under hospice care tomorrow  Seroquel for agitation  family not interested in PEG tube, discussed with family pleasure feeds-this is what they would like to proceed with, being aware of the risk of aspiration  He appears more alert today  Hospice following    I personally saw, examined and provided care for the patient. Radiographs, labs and medication list were reviewed by me independently. The case was discussed in detail and plans for care were established. Review of [address] [facility], documentation was conducted and revisions were made as appropriate directly by me. I agree with the above documented exam, problem list, and plan of care.      Rancho Moss MD  4:45 PM  8/2/2021

## 2021-08-02 NOTE — PROGRESS NOTES
[facility] Quality Flow/Interdisciplinary Rounds Progress Note        Quality Flow Rounds held on August 2, 2021    Disciplines Attending:  Bedside Nurse, ,  and Nursing Unit Leadership    Wiley Lo was admitted on 7/28/2021 11:15 PM    Anticipated Discharge Date:  Expected Discharge Date: 08/01/21    Disposition:    Miguel Score:  Miguel Scale Score: 12    Readmission Risk              Risk of Unplanned Readmission:  27           Discussed patient goal for the day, patient clinical progression, and barriers to discharge. The following Goal(s) of the Day/Commitment(s) have been identified: Continues to receive Clindamycin IV Q 8 hours and Decadron IV Q 8 hours, Vitamin K IV x 1, monitor vitals and lab values, check ENT plan, discharge planning.       Catalina Mclain RN  August 2, 2021

## 2021-08-02 NOTE — PROGRESS NOTES
[facility]     Liaison Information Visit Note              Patient Name: Walt Rios   :  1935  MRN:  [id number]  Admit date:  2021   Hospital Admitting Physician:  Janine Díaz MD   PCP:  Mylo Schwab, MD  Primary Insurance: Payor: Rey Summer /  /  /    Emergency Contact:   Contact/Relation:  Shira Mccloud /  Spouse     Phone:   [phone]  Advance Directive  Patient has NOT completed an advance directive  and Patient does not have a documented healthcare surrogate  Discussed with: Family member  Terminal Diagnosis End Stage COPD; Alzheimers Dementia as confirmed by Dr. Monica Myles Problem List:   Patient Active Problem List   Diagnosis Code    Acute and chronic respiratory failure with hypoxia ([facility]) J96.21    COPD (chronic obstructive pulmonary disease) ([facility]) J44.9    Diabetes mellitus type 2, uncontrolled ([facility] [address] E11.65    History of DVT (deep vein thrombosis) Z86.718    Obesity (BMI 30-39. 9) E66.9    Chronic respiratory failure with hypoxia ([facility]) J96.11    Alzheimer's dementia with behavioral disturbance ([facility]) G30.9, F02.81    Ureterolithiasis N20.1    DEMI (acute kidney injury) ([facility] [address] N17.9    CKD (chronic kidney disease) stage 3, GFR 30-59 ml/min ([facility]) N18.30    Chronic anticoagulation Z79.01    Fever R50.9    Hyperkalemia E87.5       Code Status Order: Limited     Allergies:  Penicillin g, Penicillins, and Tetanus toxoids    Family Goal: Patient to be able to go home    Meeting held with Wife, Anna Marie Lcok and daughter Lianne Bermudez    The hospice benefit and philosophy were explained including that hospice is end of life care in which, per Medicare, a patient has a terminal diagnosis that life expectancy would be 6 months or less. Hospice care is a service that is covered by most insurance plans.   The following levels of hospice care were discussed including, routine level of hospice care at private home or facility, which patient/family is responsible for any room and board fees at the facility, and general in patient level of care (GIP) at the [facility] for short term symptom management. Per Medicare guidelines, a patient is considered appropriate for GIP if they have uncontrolled symptoms such as pain, agitation, labored breathing or nausea/vomiting. Once symptoms become managed, the patient would need to be moved to a lower level of care such as home with hospice, ECF with hospice or the Transition program.  [facility] transition program also explained which is routine hospice care provided at the [facility] instead of an ECF or home. The transition program is private pay $300/day for room/board. Room/board for the transition program is not covered by Medicaid as would be in an ECF. Family informed that with the routine level of care at home or ECF,  the hospice team consists of the RN who visits 1-3 times a week, a  who visits within the first five days of the hospice election, the personal care team who visit 1-3 times a week, non-medical volunteers and Chaplains. Explained that at home in routine level of care, familles are responsible for the 24 hour care. Discussed that under hospice care patient would not receive chemotherapy, radiation, immune therapy, IV antibiotics, dialysis or blood transfusions. Explained that once in hospice care, all aggressive treatments would be stopped and allow nature to takes its course with focus on comfort care for the patient. Met with Charles Richards outside of patient's room. Prior to coming into the hospital Zeeshan had been up walking around with a cane. He would have good days and bad days with confusion. Shanna Vidal would have to direct him to do ADL's, but he was able to with direction. Patient not needing more support and assistance with walking. He is having difficulty swallowing and is NPO. Family does not want a PEG.  Discussed how Zeeshan's condition is going to decline and he will need 24/7 caregivers. Family was given a private duty care giver list. Family to discuss options and call this nurse or will follow-up tomorrow. Received a call around 1620 from [place]. Family has decided to proceed with hospice at home. Patient [facility] but not through [facility]. They declined needing any other equipment at this time. DME faxed order for oxygen to be delivered tomorrow 1000 to 1400. They would like medical director to follow. Will set up transportation and obtain scripts from palliative care in the morning. Updated charge nurse. Discharge Plan:  Discharge Disposition; home with hospice    [facility] plan:  1. Finish discharge home tomorrow. 2. Please call [facility] [phone] with any questions. 3. Patient not currently under the care of hospice.     Electronically signed by Yosi Vargas RN on 8/2/2021 at 5:10 PM

## 2021-08-02 NOTE — PROGRESS NOTES
OT EVALUATION ATTEMPT     Date:2021  Patient Name: Hien Michelle  MRN: [id number]  : 1935  Room: 605-A     Attempted OT session this date:    [] unavailable due to other medical staff currently with pt   [] on hold per nursing staff   [] on hold per nursing staff secondary to lab / radiology results    [x] declined treatment  this date due stating that he did not like occupational therapy. Benefits of participation in therapy reviewed with pt. No family present at this time. [] off unit   [] Other:     Will attempt assessment at a later time.     Kranthi Reece, OTR/L [id number]

## 2021-08-02 NOTE — PLAN OF CARE
Problem: Falls - Risk of:  Goal: Will remain free from falls  Description: Will remain free from falls  Outcome: Met This Shift  Goal: Absence of physical injury  Description: Absence of physical injury  Outcome: Met This Shift     Problem: Skin Integrity:  Goal: Will show no infection signs and symptoms  Description: Will show no infection signs and symptoms  Outcome: Ongoing  Goal: Risk for impaired skin integrity will decrease  Description: Risk for impaired skin integrity will decrease  Outcome: Ongoing

## 2021-08-02 NOTE — PROGRESS NOTES
SPEECH LANGUAGE PATHOLOGY  DAILY PROGRESS NOTE        PATIENT NAME:  Kody Flores      :  1935          TODAY'S DATE:  2021 ROOM:  [facility]        SWALLOWING    Chart reviewed including Otolaryngology notes that recommend a possible repeat video swallow eval due to decrease in diagnosed oropharyngeal edema. Pt refused bedside nasopharyngoscopy per ENT. Pt in bed, family present. Discussed above with RN, who reports that pt is lethargic at this time due to Haldol administration and not appropriate for a repeat video swallow eval. Recommend order to be placed when appropriate. Can contact SLP at  with questions. Family educated on MBS results and questions answered. Family asking if pt was cooperative during evaluation, and SLP let them know that pt was very cooperative and appropriate during the video swallow evaluation on 21. DYSPHAGIA DIAGNOSIS:  Mild oral, severe pharyngeal phase dysphagia-aspiration observed with all consistencies presented                DIET RECOMMENDATIONS:   NPO: Recommend physician consideration for placement of alternative source for nutrition/hydration/medication administration (NG tube)  if in accordance with patient/family wishes until etiology of sudden severe pharyngeal dysphagia determined.      FEEDING RECOMMENDATIONS:               Assistance level:  Not applicable                 Compensatory strategies recommended: Not applicable      Education- Pt/family educated on results and POC. Oral trials or dysphagia exercises unable to be completed due to pt lethargy. Questions answered. Will continue SP intervention as per previously established POC. Tank SEBASTIAN CCC/SLP [id number]  Speech Pathologist              CPT code(s) 82969  dysphagia tx  Total minutes :  15 minutes

## 2021-08-02 NOTE — CARE COORDINATION
Social work / Discharge Planning:       [employer] work and [email or web address] met with patient's wife and daughter to discuss transition of care. CNP present as well to answer questions. Family does not want peg tube and would like to meet with hospice. They requested [facility] and declined the freedom of choice list.   Referral called to Mckenzie Yoder. Awaiting visit from liaison.   Electronically signed by LUIZ Nunez on 8/2/2021 at 11:56 AM

## 2021-08-03 VITALS
SYSTOLIC BLOOD PRESSURE: 152 MMHG | BODY MASS INDEX: 31.88 KG/M2 | OXYGEN SATURATION: 95 % | WEIGHT: 198.4 LBS | TEMPERATURE: 98 F | HEIGHT: 66 IN | HEART RATE: 58 BPM | RESPIRATION RATE: 16 BRPM | DIASTOLIC BLOOD PRESSURE: 77 MMHG

## 2021-08-03 LAB
ANION GAP SERPL CALCULATED.3IONS-SCNC: 10 MMOL/L (ref 7–16)
BLOOD CULTURE, ROUTINE: NORMAL
BUN BLDV-MCNC: 41 MG/DL (ref 6–23)
CALCIUM SERPL-MCNC: 7.8 MG/DL (ref 8.6–10.2)
CHLORIDE BLD-SCNC: 111 MMOL/L (ref 98–107)
CO2: 20 MMOL/L (ref 22–29)
CREAT SERPL-MCNC: 1.4 MG/DL (ref 0.7–1.2)
CULTURE, BLOOD 2: NORMAL
GFR AFRICAN AMERICAN: 58
GFR NON-AFRICAN AMERICAN: 48 ML/MIN/1.73
GLUCOSE BLD-MCNC: 153 MG/DL (ref 74–99)
HCT VFR BLD CALC: 38.1 % (ref 37–54)
HEMOGLOBIN: 12.6 G/DL (ref 12.5–16.5)
INR BLD: 1.3
MCH RBC QN AUTO: 29.9 PG (ref 26–35)
MCHC RBC AUTO-ENTMCNC: 33.1 % (ref 32–34.5)
MCV RBC AUTO: 90.5 FL (ref 80–99.9)
METER GLUCOSE: 145 MG/DL (ref 74–99)
METER GLUCOSE: 147 MG/DL (ref 74–99)
PDW BLD-RTO: 14.3 FL (ref 11.5–15)
PLATELET # BLD: 120 E9/L (ref 130–450)
PMV BLD AUTO: 10.4 FL (ref 7–12)
POTASSIUM SERPL-SCNC: 4 MMOL/L (ref 3.5–5)
PROTHROMBIN TIME: 14.4 SEC (ref 9.3–12.4)
RBC # BLD: 4.21 E12/L (ref 3.8–5.8)
SODIUM BLD-SCNC: 141 MMOL/L (ref 132–146)
WBC # BLD: 7.1 E9/L (ref 4.5–11.5)

## 2021-08-03 PROCEDURE — 2700000000 HC OXYGEN THERAPY PER DAY

## 2021-08-03 PROCEDURE — 36415 COLL VENOUS BLD VENIPUNCTURE: CPT

## 2021-08-03 PROCEDURE — 80048 BASIC METABOLIC PNL TOTAL CA: CPT

## 2021-08-03 PROCEDURE — 85610 PROTHROMBIN TIME: CPT

## 2021-08-03 PROCEDURE — 92526 ORAL FUNCTION THERAPY: CPT | Performed by: SPEECH-LANGUAGE PATHOLOGIST

## 2021-08-03 PROCEDURE — 82962 GLUCOSE BLOOD TEST: CPT

## 2021-08-03 PROCEDURE — 85027 COMPLETE CBC AUTOMATED: CPT

## 2021-08-03 RX ORDER — LORAZEPAM 0.5 MG/1
1 TABLET ORAL EVERY 6 HOURS PRN
Qty: 24 TABLET | Refills: 0 | Status: SHIPPED | OUTPATIENT
Start: 2021-08-03 | End: 2021-08-06

## 2021-08-03 RX ORDER — HALOPERIDOL 1 MG/1
2 TABLET ORAL EVERY 8 HOURS PRN
Qty: 18 TABLET | Refills: 0 | Status: SHIPPED | OUTPATIENT
Start: 2021-08-03 | End: 2021-08-06

## 2021-08-03 RX ORDER — HALOPERIDOL 2 MG/ML
2 SOLUTION ORAL EVERY 8 HOURS PRN
Qty: 15 ML | Refills: 0 | Status: SHIPPED | OUTPATIENT
Start: 2021-08-03

## 2021-08-03 ASSESSMENT — PAIN SCALES - GENERAL: PAINLEVEL_OUTOF10: 0

## 2021-08-03 NOTE — PROGRESS NOTES
Follow-up with patient's daughter Trevor Lo. They are waiting on oxygen and ready for patient to come home today. Updated family transportation set up via [employer] ambulance for 1400. Received scripts from palliative care, called [facility], gave billing information to pharmacy. Visit made to [facility] 1521 bedside. Let him know his wife and daughter would not be coming in as he will be transported home at 1400. Updated bedside nurse, charge nurse, and case mangement.    Electronically signed by Chepe Medeiros RN on 8/3/2021 at 11:04 AM

## 2021-08-03 NOTE — PROGRESS NOTES
Subjective:    Patient calm and relaxed this am, spontaneously alert and communicative. The patient is resting in bed, has TSM bedside. Per RN, no acute events overnight. Denies headache, vertigo, chest pain, angina, dyspnea, abdominal pain. Per chart review, patient/patient's family has declined additional diagnostic testing and interventions, and at this time request he be discharged home with hospice. Both palliative care and Hospice are in agreement and have taken the appropriate steps in conjunction with SW/CM to transition patient home today with family with hospice. Patient to be taken by ambulance at/around 2pm.     Objective:    BP (!) 152/77   Pulse 58   Temp 98 °F (36.7 °C)   Resp 16   Ht 5' 6\" (1.676 m)   Wt 198 lb 6.4 oz (90 kg)   SpO2 95%   BMI 32.02 kg/m²     In: -   Out: 220   In: -   Out: 220 [Urine:220]    General appearance: NAD, conversant, fatigued  HEENT: AT/NC, MMM  Neck: FROM, supple  Lungs: Clear to auscultation  CV: RRR, no MRGs  Vasc: Radial pulses 2+  Abdomen: Soft, non-tender; no masses or HSM  Extremities: No peripheral edema or digital cyanosis  Skin: no rash, lesions or ulcers  Psych: Alert and oriented to person, place and time  Neuro: Alert oriented to person and place, baseline mental status for patient     Recent Labs     08/01/21  0506 08/02/21  0540 08/03/21  1105   WBC 8.8 8.7 7.1   HGB 12.8 12.5 12.6   HCT 38.2 37.1 38.1    149 120*       Recent Labs     08/01/21  0506 08/02/21  0540 08/03/21  1105    141 141   K 4.8 4.0 4.0   * 112* 111*   CO2 20* 20* 20*   BUN 40* 45* 41*   CREATININE 1.4* 1.4* 1.4*   CALCIUM 8.4* 8.0* 7.8*       Assessment:    Principal Problem:    DEMI (acute kidney injury) (HCC)  Active Problems:    Diabetes mellitus type 2, uncontrolled (HCC)    Fever    Hyperkalemia  Resolved Problems:    * No resolved hospital problems.  *      Plan:  52-year-old male with a past medical history of DM, hypertension, and COPD admitted to telemetry unit with     Acute kidney injury  -IV hydration  -Monitor labs - Creat leveled out to 1.4  -Supplement O2 to keep saturation greater than 93%  -SLP & Video swallow pending - failed  -ENT consulted at the recommendation and patient refused testing patient was started on -Cleocin 600 mg and decadron 10mg IV     Diabetes mellitus  -Monitor labs  -ISS glucose control  -Hypoglycemia protocol initiated  -Discuss diet modification     Pleasure feeds ordered (diet changed). Plan for today is discharge home with hospice. DVT Prophylaxis   PT/OT  Discharge planning - return home with wife versus placement      Juanjose Manningma  12:34 PM  8/3/2021      .I personally saw, examined and provided care for the patient. Radiographs, labs and medication list were reviewed by me independently. The case was discussed in detail and plans for care were established. Review of [facility], documentation was conducted and revisions were made as appropriate directly by me. I agree with the above documented exam, problem list, and plan of care.      Thais Chamorro MD  8/3/2021

## 2021-08-03 NOTE — PROGRESS NOTES
8/3/2021  10:40 AM           Nutrition Note    Plan for Discharge to home today with Hospice. He failed swallow eval 8/2 and family has decided on no PEG and proceed with Hospice. Recommend comfort care/pleasure meals. Dietitian available per Consult.       Electronically signed by Sarai Velasquez RD, CNSC, LD on 8/3/21 at 10:40 AM EDT    Contact: [phone]

## 2021-08-03 NOTE — PROGRESS NOTES
[facility] Quality Flow/Interdisciplinary Rounds Progress Note        Quality Flow Rounds held on August 3, 2021    Disciplines Attending:  Bedside Nurse, ,  and Nursing Unit Leadership    Caryn Gutierrez was admitted on 7/28/2021 11:15 PM    Anticipated Discharge Date:  Expected Discharge Date: 08/01/21    Disposition:    Miguel Score:  Miguel Scale Score: 11    Readmission Risk              Risk of Unplanned Readmission:  27           Discussed patient goal for the day, patient clinical progression, and barriers to discharge. The following Goal(s) of the Day/Commitment(s) have been identified:  Home with hospice today.       Geoff Page RN  August 3, 2021

## 2021-08-03 NOTE — PROGRESS NOTES
Palliative medicine    Prescriptions for ativan/haldol sent to hospital pharmacy for discharge with hospice today.     Ludwig Hatchet PA-C

## 2021-08-03 NOTE — CARE COORDINATION
Social work / Discharge planning:       Plan is for discharge home today with [address] once DME delivered.    Electronically signed by LUIZ Painter on 8/3/2021 at 9:57 AM

## 2021-08-03 NOTE — PROGRESS NOTES
SPEECH LANGUAGE PATHOLOGY  DAILY PROGRESS NOTE        PATIENT NAME:  Prasanth Molina      :  1935          TODAY'S DATE:  8/3/2021 ROOM:  [address] [place] [address]-A        SWALLOWING    Chart reviewed, noted pt is returning home with Hospice. RN reports pt placed on soft and bite sized diet, thin liquids. SLP assessed toleration at bedside. Significant clinical improvement in swallow observed. Clinical indicators of dysphagia only occurred with thin liquids. Trial of mildly thick liquids completed with good clinical toleration. Significant choking observed with thin liquids. Pt may benefit from use of liquid thickener for comfort during meals to decrease choking. DYSPHAGIA DIAGNOSIS:  oropharyngeal dysphagia                 DIET RECOMMENDATIONS:  recommended use of mildly thick liquids to decrease choking during meals.      FEEDING RECOMMENDATIONS:               Assistance level: supervision during meals                 Compensatory strategies recommended:small bites/sips, slow rate of intake      Education- Pt educated on results and POC. Questions answered. Will continue SP intervention as per previously established POC. Maria C SEBASTIAN CCC/SLP [id number]  Speech Pathologist              CPT code(s) 70792  dysphagia tx  Total minutes :  15 minutes

## 2021-08-03 NOTE — PROGRESS NOTES
CLINICAL PHARMACY NOTE: MEDS TO BEDS    Total # of Prescriptions Filled: 2   The following medications were delivered to the patient:  · Haloperidol 2mg/ml  · Lorazepam 0.5    Additional Documentation:

## 2021-08-10 NOTE — DISCHARGE SUMMARY
Physician Discharge Summary     Patient ID:  Tuyet Madrigal  [id number]  73 y.o.  1935    Admit date: 7/28/2021    Discharge date and time: 8/3/2021   Admission Diagnoses:   Patient Active Problem List   Diagnosis    Acute and chronic respiratory failure with hypoxia (HCC)    [facility] disease) [facility] [address]    Diabetes mellitus type 2, uncontrolled (HCC)    History of DVT (deep vein thrombosis)    Obesity (BMI 30-39. 9)    Chronic respiratory failure with hypoxia (HCC)    [facility]'s dementia with behavioral disturbance [facility]    Ureterolithiasis    DEMI (acute kidney injury) (HCC)    CKD (chronic kidney disease) stage 3, GFR 30-59 ml/min ([facility])    Chronic anticoagulation    Fever    Hyperkalemia       Discharge Diagnoses: DEMI and DM    Consults: Palliative care    Procedures: None    Hospital Course: The patient is a 81 y.o. male of Floresita Renteria MD with a past medical history of dementia, COPD, DM, hyperlipidemia, and hypertension. Patient does have a history of kidney stones and bladder cancer. Patient presented to the ED with complaints of altered mental status x1 day. Patient states however for the past 2 weeks he has not been able to eat or drink much due to pain in his throat and neck. Patient states there are no aggravating factors and there are no relieving factors. Patient's family is at bedside to assist with assessment. During hospital course patient did have periods of combativeness which required restraints and TSM. Given patient's age and comorbidities, discussion with family yielded decision to consult palliative care and hospice as they did not want to pursue peg placement for nutrition. Family to provide pleasure feeds and patient was discharged home with hospice.         CT HEAD WO CONTRAST    Result Date: 7/29/2021  EXAMINATION: CT OF THE HEAD WITHOUT CONTRAST  7/29/2021 1:07 am TECHNIQUE: CT of the head was performed without the administration of intravenous contrast. Dose modulation, iterative reconstruction, and/or weight based adjustment of the mA/kV was utilized to reduce the radiation dose to as low as reasonably achievable. COMPARISON: None. HISTORY: ORDERING SYSTEM PROVIDED HISTORY: alter  mental status TECHNOLOGIST PROVIDED HISTORY: Has a \"code stroke\" or \"stroke alert\" been called? ->No Reason for exam:->alter  mental status Decision Support Exception - unselect if not a suspected or confirmed emergency medical condition->Emergency Medical Condition (MA) FINDINGS: BRAIN/VENTRICLES: There is moderate generalized atrophy. There is mild patchy periventricular and subcortical white matter low attenuation that is nonspecific but most consistent with chronic small vessel ischemia. There is no evidence of acute hemorrhage, mass or extra-axial fluid collection. ORBITS: The visualized portion of the orbits demonstrate no acute abnormality. SINUSES: The visualized paranasal sinuses and mastoid air cells demonstrate no acute abnormality. SOFT TISSUES/SKULL:  No acute abnormality of the visualized skull or soft tissues. Artifact from dental hardware. No acute intracranial abnormality. Generalized atrophy and chronic small vessel ischemic white matter disease. CT CERVICAL SPINE WO CONTRAST    Result Date: 7/29/2021  EXAMINATION: CT OF THE CERVICAL SPINE WITHOUT CONTRAST 7/29/2021 1:07 am TECHNIQUE: CT of the cervical spine was performed without the administration of intravenous contrast. Multiplanar reformatted images are provided for review. Dose modulation, iterative reconstruction, and/or weight based adjustment of the mA/kV was utilized to reduce the radiation dose to as low as reasonably achievable. COMPARISON: None.  HISTORY: ORDERING SYSTEM PROVIDED HISTORY: pain TECHNOLOGIST PROVIDED HISTORY: Reason for exam:->pain Decision Support Exception - unselect if not a suspected or confirmed emergency medical condition->Emergency Medical Condition (MA) FINDINGS: BONES/ALIGNMENT: There is a mild degenerative anterolisthesis of C4 on C5 and a minimal degenerative anterolisthesis of C3 on C4. Vertebral body alignment is otherwise normal.  Cervical vertebral body heights are. There is no acute fracture or traumatic malalignment. DEGENERATIVE CHANGES: There are extensive multilevel degenerative changes. There is prominent pannus formation with calcification posterior to the odontoid process. There is a large central disc herniation at C3-C4 causing moderate to severe cervical cord flattening (axial image 30). Moderate to severe right and severe left nerve root canal stenoses at C3-C4. Broad-based mixed protrusion at C5-C6 causes moderate cord flattening. SOFT TISSUES: There is no prevertebral soft tissue swelling. Severe multilevel degenerative changes in the cervical spine with no acute fracture traumatic malalignment. There is a large central disc herniation at C3-C4 causing moderate to severe cervical cord flattening. XR CHEST 1 VIEW    Result Date: 7/29/2021  EXAMINATION: ONE XRAY VIEW OF THE CHEST 7/29/2021 12:21 am COMPARISON: 08/31/2008 HISTORY: ORDERING SYSTEM PROVIDED HISTORY: cough TECHNOLOGIST PROVIDED HISTORY: Reason for exam:->cough FINDINGS: The lungs are without acute focal process. There is no effusion or pneumothorax. Stable cardiomegaly. The osseous structures are without acute process. No acute process. Stable cardiomegaly.        Discharge Exam:    General appearance: NAD, conversant, fatigued  HEENT: AT/NC, MMM  Neck: FROM, supple  Lungs: Clear to auscultation  CV: RRR, no MRGs  Vasc: Radial pulses 2+  Abdomen: Soft, non-tender; no masses or HSM  Extremities: No peripheral edema or digital cyanosis  Skin: no rash, lesions or ulcers  Psych: Alert and oriented to person, place and time  Neuro: Alert oriented to person and place, baseline mental status for patient    Disposition: Home with Hospice     Patient Condition at Discharge: Fair    Patient Instructions:      Medication List      START taking these medications    haloperidol 1 MG tablet  Commonly known as: HALDOL  Take 2 tablets by mouth every 8 hours as needed for Agitation     haloperidol 2 MG/ML solution  Commonly known as: HALDOL  Take 1 mL by mouth every 8 hours as needed (agiation)        CONTINUE taking these medications    allopurinol 100 MG tablet  Commonly known as: ZYLOPRIM     aspirin 81 MG tablet     cetirizine 10 MG tablet  Commonly known as: ZYRTEC     Detrol LA 4 MG extended release capsule  Generic drug: tolterodine     finasteride 5 MG tablet  Commonly known as: PROSCAR     glimepiride 4 MG tablet  Commonly known as: AMARYL     HYDROcodone-acetaminophen 7.5-325 MG per tablet  Commonly known as: NORCO     insulin glargine 100 UNIT/ML injection vial  Commonly known as: LANTUS     lansoprazole 30 MG delayed release capsule  Commonly known as: PREVACID     losartan 25 MG tablet  Commonly known as: COZAAR     OXYGEN     simvastatin 40 MG tablet  Commonly known as: ZOCOR     SITagliptin 100 MG tablet  Commonly known as: JANUVIA     tamsulosin 0.4 MG capsule  Commonly known as: FLOMAX     warfarin 5 MG tablet  Commonly known as: COUMADIN        STOP taking these medications    guaiFENesin 600 MG extended release tablet  Commonly known as: MUCINEX        ASK your doctor about these medications    LORazepam 0.5 MG tablet  Commonly known as: ATIVAN  Take 2 tablets by mouth every 6 hours as needed for Anxiety for up to 3 days. Ask about: Should I take this medication? Where to Get Your Medications      These medications were sent to [facility] Bernice Habermann [phone]  [address] [place] [address] [place], [address] [place] [address]    Phone: [phone]   · haloperidol 1 MG tablet  · haloperidol 2 MG/ML solution  · LORazepam 0.5 MG tablet       Activity: activity as tolerated  Diet: pleasure feeds    Pt has been advised to:     Follow-up

## 2021-08-19 NOTE — PROGRESS NOTES
Physician Progress Note      PATIENT:               Deven Marks  [facility] #:                  [id number]  :                       1935  ADMIT DATE:       2021 11:15 PM  [address] DATE:        8/3/2021 2:00 PM  RESPONDING  PROVIDER #:        MALAIKA Lopez MD          QUERY TEXT:    Patient admitted with DEMI. Patient noted to have Use of home oxygen. If   possible, please document in the progress notes and discharge summary if you   are evaluating and/or treating any of the following: The medical record reflects the following:  Risk Factors: COPD  Clinical Indicators: Per H&P \". ..he does wear O2 baseline 8 L at   home. Blanchie Bevels Blanchie Bevels Supplement O2 to keep saturation greater than 93% ? General   appearance. ..NAD, conversant, fatigued. Blanchie Bevels Blanchie Bevels \" Initial RR 24 SpO2 of 90 on 5LPM.   Per Discharge summary active problems list \". Blanchie Bevels Blanchie Bevels Acute and chronic respiratory   failure with hypoxia. Blanchie Bevels Blanchie Bevels \"  Treatment: Oxygen  Options provided:  -- Chronic respiratory failure with hypoxia  -- Chronic respiratory failure with hypercapnia  -- Acute on chronic respiratory failure with hypoxia  -- Acute on chronic respiratory failure with hypercapnia  -- Other - I will add my own diagnosis  -- Disagree - Not applicable / Not valid  -- Disagree - Clinically unable to determine / Unknown  -- Refer to Clinical Documentation Reviewer    PROVIDER RESPONSE TEXT:    This patient has chronic respiratory failure with hypoxia.     Query created by: Dora Alcantara on 2021 2:09 PM      Electronically signed by:  MALAIKA Lopez MD 2021 11:52 AM

## 2021-12-23 DIAGNOSIS — Z51.5 HOSPICE CARE PATIENT: ICD-10-CM

## 2021-12-23 DIAGNOSIS — R52 PAIN: Primary | ICD-10-CM

## 2021-12-23 RX ORDER — HYDROCODONE BITARTRATE AND ACETAMINOPHEN 7.5; 325 MG/1; MG/1
1 TABLET ORAL EVERY 6 HOURS PRN
Qty: 90 TABLET | Refills: 0 | Status: SHIPPED | OUTPATIENT
Start: 2021-12-23 | End: 2022-01-22

## 2022-01-26 DIAGNOSIS — Z51.5 HOSPICE CARE PATIENT: ICD-10-CM

## 2022-01-26 DIAGNOSIS — R52 PAIN: Primary | ICD-10-CM

## 2022-01-26 RX ORDER — HYDROCODONE BITARTRATE AND ACETAMINOPHEN 7.5; 325 MG/1; MG/1
1 TABLET ORAL EVERY 6 HOURS PRN
Qty: 90 TABLET | Refills: 0 | Status: SHIPPED | OUTPATIENT
Start: 2022-01-26 | End: 2022-02-09 | Stop reason: SDUPTHER

## 2022-02-09 DIAGNOSIS — R52 PAIN: ICD-10-CM

## 2022-02-09 DIAGNOSIS — Z51.5 HOSPICE CARE PATIENT: ICD-10-CM

## 2022-02-09 RX ORDER — HYDROCODONE BITARTRATE AND ACETAMINOPHEN 7.5; 325 MG/1; MG/1
1 TABLET ORAL EVERY 6 HOURS PRN
Qty: 90 TABLET | Refills: 0 | Status: SHIPPED | OUTPATIENT
Start: 2022-02-09 | End: 2022-02-23 | Stop reason: SDUPTHER

## 2022-02-23 DIAGNOSIS — Z51.5 HOSPICE CARE PATIENT: ICD-10-CM

## 2022-02-23 DIAGNOSIS — R52 PAIN: ICD-10-CM

## 2022-02-23 RX ORDER — HYDROCODONE BITARTRATE AND ACETAMINOPHEN 7.5; 325 MG/1; MG/1
1 TABLET ORAL EVERY 6 HOURS PRN
Qty: 90 TABLET | Refills: 0 | Status: ON HOLD | OUTPATIENT
Start: 2022-02-23 | End: 2022-03-09 | Stop reason: SDUPTHER

## 2022-03-09 DIAGNOSIS — R52 PAIN: ICD-10-CM

## 2022-03-09 DIAGNOSIS — Z51.5 HOSPICE CARE PATIENT: ICD-10-CM

## 2022-03-09 RX ORDER — HYDROCODONE BITARTRATE AND ACETAMINOPHEN 7.5; 325 MG/1; MG/1
1 TABLET ORAL EVERY 6 HOURS PRN
Qty: 90 TABLET | Refills: 0 | Status: SHIPPED | OUTPATIENT
Start: 2022-03-09 | End: 2022-04-08

## 2022-03-14 LAB — SOURCE: NORMAL

## 2022-03-15 LAB — SARS-COV-2, PCR: NOT DETECTED

## 2022-03-19 RX ORDER — TAMSULOSIN HYDROCHLORIDE 0.4 MG/1
0.4 CAPSULE ORAL DAILY
Qty: 30 CAPSULE | Refills: 0 | Status: SHIPPED | OUTPATIENT
Start: 2022-03-19 | End: 2022-04-18

## 2022-04-23 ENCOUNTER — HOSPITAL ENCOUNTER (EMERGENCY)
Age: 87
Discharge: HOME | End: 2022-04-23
Attending: EMERGENCY MEDICINE
Payer: MEDICARE

## 2022-04-23 VITALS
SYSTOLIC BLOOD PRESSURE: 123 MMHG | TEMPERATURE: 97.6 F | OXYGEN SATURATION: 98 % | HEART RATE: 70 BPM | WEIGHT: 198 LBS | RESPIRATION RATE: 18 BRPM | BODY MASS INDEX: 31.96 KG/M2 | DIASTOLIC BLOOD PRESSURE: 77 MMHG
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
## 2022-04-23 PROCEDURE — 99283 EMERGENCY DEPT VISIT LOW MDM: CPT

## 2022-04-23 NOTE — ED NOTES
RN spoke with PAS, [facility] pickup ETA [id number]     Louis Williamson, [facility]  04/23/22 6789

## 2022-04-23 NOTE — ED PROVIDER NOTES
HPI:  4/23/22, Time: 2:44 AM EDT         Kody Flores is a 81 y.o. male presenting to the ED for reported multiple falls at [address] History is limited due to the patient's dementia. I was able to speak with the patient's family on the phone who states that he is confused at baseline. Also states that he is on oxygen at baseline. Patient is currently on hospice patient. Patient's family does not want work-up and would like the patient to be kept comfortable. Patient has no complaints on my examination. Review of Systems:   Unable to obtain complete ROS due to patient's confusion.        --------------------------------------------- PAST HISTORY ---------------------------------------------  Past Medical History:  has a past medical history of COPD (chronic obstructive pulmonary disease) ([facility] [address] Diabetes mellitus ([facility] [address] Hyperlipidemia, and Hypertension. Past Surgical History:  has a past surgical history that includes Lithotripsy (N/A, 9/24/2019) and Lithotripsy (Left, 11/26/2019). Social History:  reports that he has quit smoking. He has never used smokeless tobacco. He reports that he does not drink alcohol and does not use drugs. Family History: family history is not on file. The patients home medications have been reviewed. Allergies: Penicillin g, Penicillins, and Tetanus toxoids    -------------------------------------------------- RESULTS -------------------------------------------------  All laboratory and radiology results have been personally reviewed by myself   LABS:  No results found for this visit on 04/23/22. RADIOLOGY:  Interpreted by Radiologist.  No orders to display       ------------------------- NURSING NOTES AND VITALS REVIEWED ---------------------------   The nursing notes within the ED encounter and vital signs as below have been reviewed.    /69   Pulse 83   Temp 97.6 °F (36.4 °C)   Resp 16   Wt 198 lb (89.8 kg)   SpO2 93%   BMI 31.96 kg/m²   Oxygen Saturation Interpretation: Abnormal, but at baseline.      ---------------------------------------------------PHYSICAL EXAM--------------------------------------      Constitutional/General: Alert and oriented x1, appears ill  Head: Normocephalic and atraumatic  Eyes: PERRL, EOMI  Mouth: Oropharynx clear, handling secretions, no trismus  Neck: Supple, full ROM, no cervical spine tenderness  Pulmonary: Lungs clear to auscultation bilaterally, no wheezes, rales, or rhonchi. Not in respiratory distress  Cardiovascular:  Regular rate and rhythm, no murmurs, gallops, or rubs. 2+ distal pulses  Abdomen: Soft, non tender, non distended, pelvis stable, normal range of motion to hips bilaterally without tenderness. Extremities: Moves all extremities x 4. Warm and well perfused. No tenderness to extremities. Skin: warm and dry without rash  Neurologic: Lethargic, arouses to verbal stimuli, will not open eyes to verbal stimuli but will answer some questions, weak but equal in all extremities, no focal neurologic deficits.      ------------------------------ ED COURSE/MEDICAL DECISION MAKING----------------------  Medications - No data to display    Medical Decision Making/ED COURSE:   Patient is an 81-year-old male presenting after reported falls at the nursing home. Patient is currently a hospice patient. I spoke with the patient's family on the phone who states that they do not want any testing or treatment and would like the patient to be kept comfortable and sent back to the nursing home for continued hospice care. Patient remained hemodynamically stable throughout ED course. ED Course as of 04/23/22 0255   Sat Apr 23, 2022   3683 I attempted to call the patient's first emergency contact, Rosemarie Woodard. She did not answer the phone. I then called Shayan Buchanan, patient's wife. I informed that the patient is in hospice care.   I asked if she would like me to do any testing or if she would like the patient to just be kept comfortable. She states she does not know when she would like to speak with her daughter first.  She asks that we do not perform any testing until her daughter calls back with the plan. [JA]   [id number] I spoke with the patient's daughter, Theodora Thomas, on the phone. I reviewed the patient's chart which documents that he has a limited code with no resuscitative medications or resuscitative efforts. The patient's daughter confirmed that the patient is currently in hospice care. She does not want any invasive testing or work-up. She would like the patient discharged back to the nursing home for continued hospice care. [JA]      ED Course User Index  [JA] Serafin Baker MD       New Prescriptions    No medications on file     Serafin Baker MD      Counseling: The emergency provider has spoken with the family and discussed todays results, in addition to providing specific details for the plan of care and counseling regarding the diagnosis and prognosis. Questions are answered at this time and they are agreeable with the plan.      --------------------------------- IMPRESSION AND DISPOSITION ---------------------------------    IMPRESSION  1. Fall, initial encounter        DISPOSITION  Disposition: Discharge to nursing home  Patient condition is stable      NOTE: This report was transcribed using voice recognition software.  Every effort was made to ensure accuracy; however, inadvertent computerized transcription errors may be present    I, Serafin Baker MD, am the primary provider of this record       Serafin Baker MD  04/23/22 5086

## 2022-04-23 NOTE — ED NOTES
Report called to Julianne Augustin RN of [facility], awaiting transport     [facility]  04/23/22 7227

## 2022-04-23 NOTE — ED NOTES
MD spoke with pt wife and daughter, states they want nothing done for pt, wish for pt to be discharged back to [facility]     Nathan Mile, RN  04/23/22 7912

## 2024-04-29 NOTE — CONSULTS
INPATIENT CARDIOLOGY CONSULT    Name: Freda Kapoor    Age: 81 y.o. Date of Admission: 7/28/2021 11:15 PM    Date of Service: 7/31/2021    Reason for Consultation: Altered mental status, chronic obstructive lung disease, hypertension, acute kidney injury superimposed upon chronic kidney disease, severe cognitive dysfunction    Referring Physician: Thais Chamorro MD    History of Present Illness: The patient is an 60-year-old white male with no association to [facility]. He has severe cognitive dysfunction and can provide no history with history presently exclusively obtained from review of medical records with previous records assisted by history provided by his wife. He apparently has a history of hypertension, diabetes with associated stage III chronic kidney disease, hyperlipidemia and chronic obstructive lung disease as well as that of renal calculi in addition to his cognitive dysfunction. His presentation was apparently prompted by altered mental status in the face of anorexia over the past several weeks in part related to pain of his neck and throat as well as that of dysphagia. At the time of his emergency room evaluation, a resting electrocardiogram reviewed time of assessment demonstrated evidence of sinus rhythm with a right bundle branch block of a chronic nature and no significant changes from previous assessments as well as a chest x-ray demonstrating poor inspiratory effort contributing to cardiomegaly in the absence of definitive infiltrates. He was hemodynamically stable and afebrile upon presentation with evidence of acute kidney injury presently resolving with management during hospitalization and normal CK and myocardial index.     .    Review of Systems:   Based on his medical condition and cognitive dysfunction a review of systems cannot be obtained    Past Medical History:  Past Medical History:   Diagnosis Date    [facility] [address]     Diabetes mellitus ([facility] [address]     Hyperlipidemia     Hypertension        Past Surgical History:  Past Surgical History:   Procedure Laterality Date    LITHOTRIPSY N/A 9/24/2019    CYSTOSCOPY RETROGRADE PYELOGRAM URETEROSCOPY J STENT LASER LITHOTRIPSY performed by Dilcia Mary MD at [facility] 22 LITHOTRIPSY Left 11/26/2019    LEFT CYSTOSCOPY RETROGRADE PYELOGRAM URETEROSCOPY STENT INSERTION performed by Andrew Palacio MD at [facility]       Family History:  No family history on file. Social History:  Social History     Socioeconomic History    Marital status:      Spouse name: Not on file    Number of children: Not on file    Years of education: Not on file    Highest education level: Not on file   Occupational History    Not on file   Tobacco Use    Smoking status: Former Smoker    Smokeless tobacco: Never Used   Vaping Use    Vaping Use: Never used   Substance and Sexual Activity    Alcohol use: No    Drug use: No    Sexual activity: Not on file   Other Topics Concern    Not on file   Social History Narrative    Not on file     Social Determinants of Health     Financial Resource Strain:     Difficulty of Paying Living Expenses:    Food Insecurity:     Worried About [address] in the Last Year:     [address] [employer] in the Last Year:    Transportation Needs:     Lack of Transportation (Medical):      Lack of Transportation (Non-Medical):    Physical Activity:     Days of Exercise per Week:     Minutes of Exercise per Session:    Stress:     Feeling of Stress :    Social Connections:     Frequency of Communication with Friends and Family:     Frequency of Social Gatherings with Friends and Family:     Attends [employer] Services:     Active Member of Clubs or Organizations:     Attends Club or Organization Meetings:     Marital Status:    Intimate Partner Violence:     Fear of Current or Ex-Partner:     Emotionally Abused:     Physically Abused:     Sexually Abused: Route Frequency Provider Last Rate Last Admin    0.9 % sodium chloride infusion   Intravenous Continuous Rancho Moss MD 75 mL/hr at 07/30/21 1752 1,000 mL at 07/30/21 1752    LORazepam (ATIVAN) injection 0.5 mg  0.5 mg Intravenous Q4H PRN GORDY Forde - CNP   0.5 mg at 07/31/21 0054    allopurinol (ZYLOPRIM) tablet 100 mg  100 mg Oral Daily LILLIAM Perez   100 mg at 07/30/21 1008    aspirin chewable tablet 81 mg  81 mg Oral Daily LILLIAM Perez   81 mg at 07/30/21 1008    cetirizine (ZYRTEC) tablet 5 mg  5 mg Oral Daily LILLIAM Perez   5 mg at 07/30/21 1008    finasteride (PROSCAR) tablet 5 mg  5 mg Oral Daily LILLIAM Perez   5 mg at 07/30/21 1008    HYDROcodone-acetaminophen ([place] Members) 7.5-325 MG per tablet 1 tablet  1 tablet Oral Q6H PRN LILLIAM Perez   1 tablet at 07/30/21 0550    tamsulosin (FLOMAX) capsule 0.4 mg  0.4 mg Oral Daily LILLIAM Perez   0.4 mg at 07/30/21 1008    warfarin (COUMADIN) tablet 5 mg  5 mg Oral Daily LILLIAM Perez   5 mg at 07/29/21 1805    hydrALAZINE (APRESOLINE) injection 10 mg  10 mg Intravenous Q6H PRN LILLIAM Perez        sodium chloride flush 0.9 % injection 10 mL  10 mL Intravenous 2 times per day LILLIAM Perez   10 mL at 07/31/21 0934    sodium chloride flush 0.9 % injection 10 mL  10 mL Intravenous PRN LILLIAM Perez        0.9 % sodium chloride infusion  25 mL Intravenous PRN LILLIAM Perez        potassium chloride (KLOR-CON M) extended release tablet 40 mEq  40 mEq Oral PRN LILLIAM Perez        Or    potassium bicarb-citric acid (EFFER-K) effervescent tablet 40 mEq  40 mEq Oral PRN LILLIAM Perez        Or    potassium chloride 10 mEq/100 mL IVPB (Peripheral Line)  10 mEq Intravenous PRN LILLIAM Perez        enoxaparin (LOVENOX) injection 30 mg  30 mg Subcutaneous Daily LILLIAM Perez   30 mg at 07/29/21 1445    ondansetron (ZOFRAN-ODT) disintegrating tablet 4 mg  4 mg Oral Q8H PRN LILLIAM Adrian        Or    ondansetron [facility]) injection 4 mg  4 mg Intravenous Q6H PRN LILLIAM Adrian        magnesium hydroxide (MILK OF MAGNESIA) 400 MG/5ML suspension 30 mL  30 mL Oral Daily PRN LILLIAM Adrian        acetaminophen (TYLENOL) tablet 650 mg  650 mg Oral Q6H PRN LILLIAM Adrian        Or    acetaminophen (TYLENOL) suppository 650 mg  650 mg Rectal Q6H PRN LILLIAM Adrian        glucose (GLUTOSE) 40 % oral gel 15 g  15 g Oral PRN LILLIAM Adrian        dextrose 50 % IV solution  12.5 g Intravenous PRN LILLIAM Adrian        glucagon (rDNA) injection 1 mg  1 mg Intramuscular PRN LILLIAM Adrian        dextrose 5 % solution  100 mL/hr Intravenous PRN LILLIAM Adrian        0.9 % sodium chloride infusion   Intravenous Continuous LILLIAM Adrian 100 mL/hr at 07/29/21 1337 1,000 mL at 07/29/21 1337    insulin lispro (HUMALOG) injection vial 0-12 Units  0-12 Units Subcutaneous TID WC LILLIAM Adrian   2 Units at 07/31/21 1132    insulin lispro (HUMALOG) injection vial 0-6 Units  0-6 Units Subcutaneous Nightly LILLIAM Adrian   1 Units at 07/30/21 2040    guaiFENesin tablet 400 mg  400 mg Oral 6x Daily LILLIAM Adrian   400 mg at 07/30/21 1008    pantoprazole (PROTONIX) tablet 40 mg  40 mg Oral QAM AC LILLIAM Adrian   40 mg at 07/30/21 0551    atorvastatin (LIPITOR) tablet 20 mg  20 mg Oral Nightly LILLIAM Adrian   20 mg at 07/29/21 2029    trospium (SANCTURA) tablet 20 mg  20 mg Oral Nightly LILLIAM Adrian   20 mg at 07/29/21 2029      sodium chloride 1,000 mL (07/30/21 1752)    sodium chloride      dextrose      sodium chloride 1,000 mL (07/29/21 1337)         Physical Exam:  BP (!) 161/88   Pulse 76   Temp 98 °F (36.7 °C) (Axillary)   Resp 20   Ht 5' 6\" (1.676 m)   Wt 204 lb 11.2 oz (92.9 kg)   SpO2 95%   BMI 33.04 kg/m²   Weight change:    Wt Readings from Last 3 Encounters:   07/30/21 204 lb 11.2 oz (92.9 kg)   11/26/19 210 lb (95.3 kg)   09/27/19 224 lb 6.4 oz (101.8 kg)     The patient is awake, alert and in no discomfort or distress. He is minimally verbal with no recollection of details related to hospitalization. No gross musculoskeletal deformity or lymphadenopathy are present. No significant skin or nail changes are present. Gross examination of head, eyes, nose and throat are negative. Jugular venous pressure is normal and no carotid bruits are present. No thyromegaly is noted. Normal respiratory effort is noted with no accessory muscle usage present. Lung fields are clear to ascultation. Cardiac examination is notable for a regular rate and rhythm with no palpable thrill. No gallop rhythm or cardiac murmur are identified. A benign abdominal examination is present with no masses or organomegaly. A normal abdominal aortic pulsation is present. Intact pulses are present throughout all extremities and no peripheral edema is present. No focal neurologic deficits are present.     Intake/Output:    Intake/Output Summary (Last 24 hours) at 7/31/2021 1155  Last data filed at 7/31/2021 0934  Gross per 24 hour   Intake 250 ml   Output 200 ml   Net 50 ml     I/O this shift:  In: 10 [I.V.:10]  Out: -     Laboratory Tests:  Lab Results   Component Value Date    CREATININE 1.5 (H) 07/31/2021    BUN 34 (H) 07/31/2021     07/31/2021    K 4.5 07/31/2021     07/31/2021    CO2 17 (L) 07/31/2021     Recent Labs     07/29/21  0115   CKTOTAL 94   CKMB 1.6     No results found for: BNP  Lab Results   Component Value Date    WBC 5.4 07/31/2021    RBC 4.32 07/31/2021    HGB 13.2 07/31/2021    HCT 39.5 07/31/2021    MCV 91.4 07/31/2021    MCH 30.6 07/31/2021    MCHC 33.4 07/31/2021    RDW 14.2 07/31/2021     07/31/2021    MPV 10.9 07/31/2021     Recent Labs     07/29/21  0115   ALKPHOS 90   ALT 12   AST 12   PROT 7.2   BILITOT 0.9   LABALBU 3.7     Lab Results   Component Value Date    MG 1.7 09/01/2018     Lab Results   Component Value Date    PROTIME 27.0 07/31/2021    INR 2.4 07/31/2021     Lab Results   Component Value Date    TSH 1.640 08/31/2018     No components found for: CHLPL  No results found for: TRIG  No results found for: HDL  No results found for: [address]      Cardiac Tests:  ECG: A resting electrocardiogram reviewed at the time of evaluation demonstrates evidence of sinus rhythm with a chronically noted right bundle branch block conduction pattern and no significant changes from previous tracings  Telemetry findings reviewed: sinus rhythm, no new tachy/bradyarrhythmias overnight  Chest X-ray: A chest x-ray reviewed at the time evaluation demonstrates poor inspiratory effort contributing to cardiomegaly with no significant interstitial infiltrates      ASSESSMENT / PLAN: On a clinical basis, the patient presents with altered mental status and nonspecific noncardiovascular complaints and in the face of acute kidney injury superimposed upon chronic kidney disease and chronic significant cognitive dysfunction. Presently, no evidence of active cardiovascular disease is present and at present no additional needs of assessment are noted. The results of his swallowing study and speech recommendations have been noted and if percutaneous gastrostomy is necessitated, he would be an acceptable candidate from a cardiovascular standpoint. Ongoing appropriate risk factor modification of blood pressure, diabetes and serum lipids will be essential to reducing risk of future atherosclerotic development. We will further evaluate him should additional cardiovascular difficulties or concerns arise. Thank you for allowing me to participate in your patient's care. Please feel free to contact me if you have any questions or concerns. Note: This report was completed using computerized voice recognition software.  Every effort has been made to ensure accuracy, however; inadvertent computerized transcription errors may be present. Ptaricia Mccracken.  Lila Bailey, [address] [facility] facial pain

## 2025-05-05 NOTE — ED NOTES
RN spoke with Yolande Pringle RN from [address] [place] [address], states pt is under their care, confirms pt is [facility], RN  04/23/22 2318 Controlled with current regime

## (undated) DEVICE — GOWN,SIRUS,FABRNF,XL,20/CS: Brand: MEDLINE

## (undated) DEVICE — BAG DRNGE COMB PK

## (undated) DEVICE — BAG DRAINAGE CONTAINER 15 LT FLUID COLLCTN

## (undated) DEVICE — CATHETER F BLLN 5CC 16FR 2 W HYDRGEL COAT LESS TRAUM LUB

## (undated) DEVICE — 4-PORT MANIFOLD: Brand: NEPTUNE 2

## (undated) DEVICE — MEDIA CONTRAST ISOVUE  300 10X50ML

## (undated) DEVICE — READY WET SKIN SCRUB TRAY-LF: Brand: MEDLINE INDUSTRIES, INC.

## (undated) DEVICE — SOLUTION IV IRRIG WATER 1000ML POUR BRL 2F7114

## (undated) DEVICE — NITINOL STONE RETRIEVAL BASKET: Brand: ZERO TIP

## (undated) DEVICE — GARMENT,MEDLINE,DVT,INT,CALF,MED, GEN2: Brand: MEDLINE

## (undated) DEVICE — CYSTO PACK: Brand: MEDLINE INDUSTRIES, INC.

## (undated) DEVICE — GUIDEWIRE ENDOSCP L150CM DIA0.035IN TIP 3CM PTFE NIT

## (undated) DEVICE — CAMERA STRYKER 1488 HD GEN

## (undated) DEVICE — URETERAL STENT
Type: IMPLANTABLE DEVICE | Site: URETER | Status: NON-FUNCTIONAL
Brand: PERCUFLEX™
Removed: 2019-09-24

## (undated) DEVICE — BAG SPECIMEN BIOHAZARD 6IN X 9IN

## (undated) DEVICE — Z INACTIVE USE 2660664 SOLUTION IRRIG 3000ML 0.9% SOD CHL USP UROMATIC PLAS CONT

## (undated) DEVICE — SOLUTION SURG PREP ANTIMICROBIAL 4 OZ SKIN WND EXIDINE

## (undated) DEVICE — CATHETER URET 5FR L70CM OPN END SGL LUMN INJ HUB FLEXIMA

## (undated) DEVICE — DRAINBAG,ANTI-REFLUX TOWER,L/F,2000ML,LL: Brand: MEDLINE

## (undated) DEVICE — GAUZE,SPONGE,4"X4",8PLY,STRL,LF,10/TRAY: Brand: MEDLINE

## (undated) DEVICE — Z INACTIVE USE 2635503 SOLUTION IRRIG 3000ML ST H2O USP UROMATIC PLAS CONT